# Patient Record
Sex: MALE | Race: BLACK OR AFRICAN AMERICAN | NOT HISPANIC OR LATINO | Employment: UNEMPLOYED | URBAN - METROPOLITAN AREA
[De-identification: names, ages, dates, MRNs, and addresses within clinical notes are randomized per-mention and may not be internally consistent; named-entity substitution may affect disease eponyms.]

---

## 2017-01-11 ENCOUNTER — INITIAL CONSULT (OUTPATIENT)
Dept: TRANSPLANT | Facility: CLINIC | Age: 52
End: 2017-01-11
Payer: MEDICAID

## 2017-01-11 ENCOUNTER — LAB VISIT (OUTPATIENT)
Dept: LAB | Facility: HOSPITAL | Age: 52
End: 2017-01-11
Attending: INTERNAL MEDICINE
Payer: MEDICAID

## 2017-01-11 VITALS
HEIGHT: 65 IN | DIASTOLIC BLOOD PRESSURE: 73 MMHG | HEART RATE: 94 BPM | SYSTOLIC BLOOD PRESSURE: 119 MMHG | WEIGHT: 177.69 LBS | BODY MASS INDEX: 29.61 KG/M2

## 2017-01-11 DIAGNOSIS — N18.4 CHRONIC KIDNEY DISEASE, STAGE 4, SEVERELY DECREASED GFR: ICD-10-CM

## 2017-01-11 DIAGNOSIS — I50.22 CHRONIC SYSTOLIC HEART FAILURE: ICD-10-CM

## 2017-01-11 DIAGNOSIS — I10 ESSENTIAL HYPERTENSION: Primary | ICD-10-CM

## 2017-01-11 DIAGNOSIS — E08.21 DIABETES MELLITUS DUE TO UNDERLYING CONDITION WITH DIABETIC NEPHROPATHY, WITHOUT LONG-TERM CURRENT USE OF INSULIN: ICD-10-CM

## 2017-01-11 DIAGNOSIS — I25.10 CORONARY ARTERY DISEASE INVOLVING NATIVE CORONARY ARTERY OF NATIVE HEART WITHOUT ANGINA PECTORIS: ICD-10-CM

## 2017-01-11 DIAGNOSIS — I50.9 CONGESTIVE HEART FAILURE, UNSPECIFIED CONGESTIVE HEART FAILURE CHRONICITY, UNSPECIFIED CONGESTIVE HEART FAILURE TYPE: ICD-10-CM

## 2017-01-11 LAB
ALBUMIN SERPL BCP-MCNC: 3 G/DL
ALP SERPL-CCNC: 147 U/L
ALT SERPL W/O P-5'-P-CCNC: 71 U/L
ANION GAP SERPL CALC-SCNC: 4 MMOL/L
AST SERPL-CCNC: 42 U/L
BASOPHILS # BLD AUTO: 0.01 K/UL
BASOPHILS NFR BLD: 0.2 %
BILIRUB SERPL-MCNC: 0.8 MG/DL
BNP SERPL-MCNC: 2840 PG/ML
BUN SERPL-MCNC: 36 MG/DL
CALCIUM SERPL-MCNC: 8.9 MG/DL
CHLORIDE SERPL-SCNC: 105 MMOL/L
CO2 SERPL-SCNC: 32 MMOL/L
CREAT SERPL-MCNC: 1.8 MG/DL
DIFFERENTIAL METHOD: ABNORMAL
EOSINOPHIL # BLD AUTO: 0.2 K/UL
EOSINOPHIL NFR BLD: 4 %
ERYTHROCYTE [DISTWIDTH] IN BLOOD BY AUTOMATED COUNT: 13.6 %
EST. GFR  (AFRICAN AMERICAN): 48.9 ML/MIN/1.73 M^2
EST. GFR  (NON AFRICAN AMERICAN): 42.3 ML/MIN/1.73 M^2
GLUCOSE SERPL-MCNC: 139 MG/DL
HCT VFR BLD AUTO: 36.3 %
HGB BLD-MCNC: 12.1 G/DL
LYMPHOCYTES # BLD AUTO: 1.7 K/UL
LYMPHOCYTES NFR BLD: 33.9 %
MCH RBC QN AUTO: 28.8 PG
MCHC RBC AUTO-ENTMCNC: 33.3 %
MCV RBC AUTO: 86 FL
MONOCYTES # BLD AUTO: 0.5 K/UL
MONOCYTES NFR BLD: 9.9 %
NEUTROPHILS # BLD AUTO: 2.6 K/UL
NEUTROPHILS NFR BLD: 51.6 %
PLATELET # BLD AUTO: 275 K/UL
PMV BLD AUTO: 9.2 FL
POTASSIUM SERPL-SCNC: 4.1 MMOL/L
PROT SERPL-MCNC: 6.8 G/DL
RBC # BLD AUTO: 4.2 M/UL
SODIUM SERPL-SCNC: 141 MMOL/L
WBC # BLD AUTO: 5.04 K/UL

## 2017-01-11 PROCEDURE — 99213 OFFICE O/P EST LOW 20 MIN: CPT | Mod: PBBFAC | Performed by: INTERNAL MEDICINE

## 2017-01-11 PROCEDURE — 99204 OFFICE O/P NEW MOD 45 MIN: CPT | Mod: S$PBB,,, | Performed by: INTERNAL MEDICINE

## 2017-01-11 PROCEDURE — 99999 PR PBB SHADOW E&M-EST. PATIENT-LVL III: CPT | Mod: PBBFAC,,, | Performed by: INTERNAL MEDICINE

## 2017-01-11 RX ORDER — ATORVASTATIN CALCIUM 40 MG/1
40 TABLET, FILM COATED ORAL NIGHTLY
COMMUNITY
Start: 2017-01-06 | End: 2018-01-16 | Stop reason: SDUPTHER

## 2017-01-11 RX ORDER — ISOSORBIDE MONONITRATE 30 MG/1
30 TABLET, EXTENDED RELEASE ORAL DAILY
COMMUNITY
Start: 2017-01-06 | End: 2018-01-16 | Stop reason: SDUPTHER

## 2017-01-11 RX ORDER — FUROSEMIDE 40 MG/1
40 TABLET ORAL DAILY
COMMUNITY
Start: 2017-01-06 | End: 2020-04-08

## 2017-01-11 RX ORDER — HYDRALAZINE HYDROCHLORIDE 10 MG/1
10 TABLET, FILM COATED ORAL 3 TIMES DAILY
COMMUNITY
Start: 2017-01-06 | End: 2018-01-16 | Stop reason: SDUPTHER

## 2017-01-11 RX ORDER — GLIMEPIRIDE 1 MG/1
2 TABLET ORAL DAILY
COMMUNITY
Start: 2017-01-06 | End: 2019-12-02

## 2017-01-11 RX ORDER — SPIRONOLACTONE 25 MG/1
25 TABLET ORAL DAILY
COMMUNITY
Start: 2017-01-06 | End: 2018-01-16 | Stop reason: SDUPTHER

## 2017-01-11 RX ORDER — ASPIRIN 81 MG/1
81 TABLET ORAL DAILY
COMMUNITY
Start: 2017-01-06 | End: 2020-08-17 | Stop reason: SDUPTHER

## 2017-01-11 RX ORDER — METOPROLOL TARTRATE 25 MG/1
25 TABLET, FILM COATED ORAL 2 TIMES DAILY
COMMUNITY
Start: 2017-01-06 | End: 2018-01-16 | Stop reason: SDUPTHER

## 2017-01-11 NOTE — LETTER
January 11, 2017        Juan C West  [address] [address]  Phone: [phone]  Fax: [phone]             [facility]  [address]  [facility] [address]  Phone: [phone]   Patient: Mick Monroe   MR Number: [id number]   YOB: 1965   Date of Visit: 1/11/2017       Dear Dr. Juan C West    Thank you for referring Mick Monroe to me for evaluation. Attached you will find relevant portions of my assessment and plan of care.    If you have questions, please do not hesitate to call me. I look forward to following Mick Monroe along with you.    Sincerely,    Jai Lau MD    Enclosure    If you would like to receive this communication electronically, please contact [email or web address] or [phone] to request [employer] Link access.    [employer] Link is a tool which provides read-only access to select patient information with whom you have a relationship. Its easy to use and provides real time access to review your patients record including encounter summaries, notes, results, and demographic information.    If you feel you have received this communication in error or would no longer like to receive these types of communications, please e-mail [email or web address]

## 2017-01-11 NOTE — MR AVS SNAPSHOT
[facility]  [address]  [facility] [address]  Phone: [phone]                  Mick Monroe   2017 1:30 PM   Initial consult    Description:  Male : 1965   Provider:  Jai Lau MD   Department:  [facility]           Reason for Visit     Heart Transplant Pre-evaluation           Diagnoses this Visit        Comments    Essential hypertension    -  Primary     Coronary artery disease involving native coronary artery of native heart without angina pectoris         Chronic systolic heart failure         Chronic kidney disease, stage 4, severely decreased GFR         Diabetes mellitus due to underlying condition with diabetic nephropathy, without long-term current use of insulin                To Do List           Goals (5 Years of Data)     None      Follow-Up and Disposition     Return in about 4 months (around 2017).    Follow-up and Disposition History      [facility]     [facility] Nurse Care [facility] -  Assistance  Registered nurses in the [facility] provide clinical advisement, health education, appointment booking, and other advisory services.  Call for this free service at [phone].             Medications           Message regarding Medications     Verify the changes and/or additions to your medication regime listed below are the same as discussed with your clinician today.  If any of these changes or additions are incorrect, please notify your healthcare provider.             Verify that the below list of medications is an accurate representation of the medications you are currently taking.  If none reported, the list may be blank. If incorrect, please contact your healthcare provider. Carry this list with you in case of emergency.           Current Medications     aspirin (ECOTRIN) 81 MG EC tablet Take 81 mg by mouth Daily.    atorvastatin (LIPITOR) 40 MG tablet Take 40 mg by mouth every evening.     "furosemide (LASIX) 40 MG tablet Take 40 mg by mouth Daily.    glimepiride (AMARYL) 1 MG tablet Take 1 mg by mouth Daily.    hydrALAZINE (APRESOLINE) 10 MG tablet Take 10 mg by mouth 3 (three) times daily.    isosorbide mononitrate (IMDUR) 30 MG 24 hr tablet Take 30 mg by mouth Daily.    metoprolol tartrate (LOPRESSOR) 25 MG tablet Take 25 mg by mouth Daily.    spironolactone (ALDACTONE) 25 MG tablet Take 25 mg by mouth Daily.    ticagrelor (BRILINTA) 90 mg tablet Take 90 mg by mouth 2 (two) times daily.           Clinical Reference Information           Vital Signs - Last Recorded  Most recent update: 1/11/2017  1:12 PM by Radha Webster MA    BP Pulse Ht Wt BMI    119/73 94 5' 5" (1.651 m) 80.6 kg (177 lb 11.1 oz) 29.57 kg/m2      Blood Pressure          Most Recent Value    BP  119/73      Allergies as of 1/11/2017     No Known Allergies      Immunizations Administered on Date of Encounter - 1/11/2017     None      Maintenance Dialysis History     Patient has no recorded history of maintenance dialysis.      Transplant Information        Txp Date Organ Coordinator Care Team     Heart Olivia Kent RN Referring Physician:  Juan C West MD   Corresponding Physician:  Juan C West MD         [facility] Sign-Up     Activating your [facility] account is as easy as 1-2-3!     1) Visit [email or web address], select Sign Up Now, enter this activation code and your date of birth, then select Next.  [email or web address]  Expires: 2/25/2017  1:44 PM      2) Create a username and password to use when you visit [facility] in the future and select a security question in case you lose your password and select Next.    3) Enter your e-mail address and click Sign Up!    Additional Information  If you have questions, please e-mail [email or web address] or call [phone] to talk to our [facility] staff. Remember, [facility] is NOT to be used for urgent needs. For medical emergencies, dial 911.         Instructions    CD       "

## 2017-01-11 NOTE — PROGRESS NOTES
Subjective:   Initial evaluation of heart transplant candidacy.    HPI:  Mr. Monroe is a 51 y.o. year old  male who has presents to be considered for advanced surgical options (LVAD/OHT).     He comes from [place]. History of CAD severe three vessel disease (not a surgical candidate??)  EF 10 to 15 % by records and history of symptomatic heart failure. He also has a history of essential hypertension and diabetes with crenal disease (creatinine 2.0). He underwent coronary stents placement. He is on the life vest they will check EF and go from there     KONG FC II-III NYHA    No past medical history on file.  No past surgical history on file.    No family history on file.    Review of Systems   Constitution: Negative for chills, decreased appetite, diaphoresis, fever, weakness, malaise/fatigue, night sweats, weight gain and weight loss.   Cardiovascular: Positive for dyspnea on exertion. Negative for chest pain, claudication, cyanosis, irregular heartbeat, leg swelling, near-syncope, orthopnea and palpitations.   Respiratory: Negative for cough, hemoptysis, shortness of breath, sleep disturbances due to breathing, snoring, sputum production and wheezing.    Gastrointestinal: Negative for abdominal pain and diarrhea.       Objective:   There were no vitals taken for this visit.body mass index is 29.57 kg/(m^2).    Physical Exam   Constitutional: He is oriented to person, place, and time. He appears well-developed and well-nourished.   HENT:   Head: Normocephalic and atraumatic.   Eyes: Conjunctivae and EOM are normal. Pupils are equal, round, and reactive to light.   Neck: Normal range of motion. Neck supple. No JVD present.   Cardiovascular: Normal rate and regular rhythm.  Exam reveals no gallop and no friction rub.    No murmur heard.  Pulmonary/Chest: Breath sounds normal. No respiratory distress. He has no wheezes. He has no rales. He exhibits no tenderness.   Abdominal: Soft. Bowel sounds are  normal. He exhibits no distension and no mass. There is no hepatosplenomegaly, splenomegaly or hepatomegaly. There is no tenderness. There is no rebound, no guarding and no CVA tenderness.   No hepatoslenomegaly   Musculoskeletal: Normal range of motion. He exhibits no edema or tenderness.   Neurological: He is alert and oriented to person, place, and time. He has normal reflexes. No cranial nerve deficit. He exhibits normal muscle tone. Coordination normal.   Skin: Skin is warm and dry.   Psychiatric: He has a normal mood and affect.       Labs:      Chemistry    No results found for: NA, K, CL, CO2, BUN, CREATININE, GLU No results found for: CALCIUM, ALKPHOS, AST, ALT, BILITOT       No results found for: MG  No results found for: WBC, HGB, HCT, MCV, PLT  No results found for: BNP  No results found for this or any previous visit.    Labs were reviewed with the patient.    Assessment:      1. Essential hypertension    2. Coronary artery disease involving native coronary artery of native heart without angina pectoris    3. Chronic systolic heart failure    4. Chronic kidney disease, stage 4, severely decreased GFR    5. Diabetes mellitus due to underlying condition with diabetic nephropathy, without long-term current use of insulin        Plan:     Patient is now NYHA III ACC stage C  Recommend 2 gram sodium restriction and 1500cc fluid restriction.  Encourage physical activity with graded exercise program.  Requested patient to weigh themselves daily, and to notify us if their weight increases by more than 3 lbs in 1 day or 5 lbs in 1 week.    Continue medical therapy. Life vets and ICD    RTC 4 months. NO need for work up for tx lvad now       Transplant Candidacy: Patient is a 51 y.o. year old male with heart failure is being seen for possible LVAD and OHT. In my opinion, he is  a suitable LVAD and OHT candidate. Patient did not meet with MCS and/or pre-transplant coordinator at the end of this visit for workup.      UNOS Patient Status  Functional Status: 90% - Able to carry on normal activity: minor symptoms of disease  Physical Capacity: Limited Mobility  Working for Income: No  If no, reason not working: Disability    Jai Lau MD

## 2017-04-05 ENCOUNTER — TELEPHONE (OUTPATIENT)
Dept: TRANSPLANT | Facility: CLINIC | Age: 52
End: 2017-04-05

## 2017-04-05 DIAGNOSIS — I50.9 CONGESTIVE HEART FAILURE, UNSPECIFIED CONGESTIVE HEART FAILURE CHRONICITY, UNSPECIFIED CONGESTIVE HEART FAILURE TYPE: Primary | ICD-10-CM

## 2017-05-09 ENCOUNTER — LAB VISIT (OUTPATIENT)
Dept: LAB | Facility: HOSPITAL | Age: 52
End: 2017-05-09
Attending: INTERNAL MEDICINE
Payer: MEDICAID

## 2017-05-09 ENCOUNTER — OFFICE VISIT (OUTPATIENT)
Dept: TRANSPLANT | Facility: CLINIC | Age: 52
End: 2017-05-09
Payer: MEDICAID

## 2017-05-09 VITALS
DIASTOLIC BLOOD PRESSURE: 79 MMHG | HEIGHT: 65 IN | BODY MASS INDEX: 31.63 KG/M2 | HEART RATE: 73 BPM | SYSTOLIC BLOOD PRESSURE: 132 MMHG | WEIGHT: 189.81 LBS

## 2017-05-09 DIAGNOSIS — N18.4 CHRONIC KIDNEY DISEASE, STAGE 4, SEVERELY DECREASED GFR: ICD-10-CM

## 2017-05-09 DIAGNOSIS — I50.9 CONGESTIVE HEART FAILURE, UNSPECIFIED CONGESTIVE HEART FAILURE CHRONICITY, UNSPECIFIED CONGESTIVE HEART FAILURE TYPE: ICD-10-CM

## 2017-05-09 DIAGNOSIS — I10 ESSENTIAL HYPERTENSION: ICD-10-CM

## 2017-05-09 DIAGNOSIS — I50.22 CHRONIC SYSTOLIC HEART FAILURE: Primary | ICD-10-CM

## 2017-05-09 DIAGNOSIS — I25.10 CORONARY ARTERY DISEASE INVOLVING NATIVE CORONARY ARTERY OF NATIVE HEART WITHOUT ANGINA PECTORIS: ICD-10-CM

## 2017-05-09 DIAGNOSIS — E08.21 DIABETES MELLITUS DUE TO UNDERLYING CONDITION WITH DIABETIC NEPHROPATHY, WITHOUT LONG-TERM CURRENT USE OF INSULIN: ICD-10-CM

## 2017-05-09 LAB
ANION GAP SERPL CALC-SCNC: 10 MMOL/L
BNP SERPL-MCNC: 451 PG/ML
BUN SERPL-MCNC: 31 MG/DL
CALCIUM SERPL-MCNC: 9.6 MG/DL
CHLORIDE SERPL-SCNC: 103 MMOL/L
CO2 SERPL-SCNC: 27 MMOL/L
CREAT SERPL-MCNC: 2 MG/DL
EST. GFR  (AFRICAN AMERICAN): 42.8 ML/MIN/1.73 M^2
EST. GFR  (NON AFRICAN AMERICAN): 37 ML/MIN/1.73 M^2
GLUCOSE SERPL-MCNC: 242 MG/DL
POTASSIUM SERPL-SCNC: 3.7 MMOL/L
SODIUM SERPL-SCNC: 140 MMOL/L

## 2017-05-09 PROCEDURE — 99999 PR PBB SHADOW E&M-EST. PATIENT-LVL III: CPT | Mod: PBBFAC,TXP,, | Performed by: INTERNAL MEDICINE

## 2017-05-09 PROCEDURE — 99215 OFFICE O/P EST HI 40 MIN: CPT | Mod: S$PBB,NTX,, | Performed by: INTERNAL MEDICINE

## 2017-05-09 PROCEDURE — 99213 OFFICE O/P EST LOW 20 MIN: CPT | Mod: PBBFAC,TXP | Performed by: INTERNAL MEDICINE

## 2017-05-09 NOTE — MR AVS SNAPSHOT
[facility]  [address]  [facility] [address]  Phone: [phone]                  Mick Monroe   2017 10:30 AM   Office Visit    Description:  Male : 1965   Provider:  Jai Lau MD   Department:  [facility]           Diagnoses this Visit        Comments    Chronic systolic heart failure    -  Primary     Coronary artery disease involving native coronary artery of native heart without angina pectoris         Essential hypertension         Chronic kidney disease, stage 4, severely decreased GFR         Diabetes mellitus due to underlying condition with diabetic nephropathy, without long-term current use of insulin                To Do List           Future Appointments        Provider Department Dept Phone    2017 9:00 AM LAB, APPOINTMENT [place] [facility] [phone]    2017 10:30 AM Jai Lau MD [facility] [phone]      Goals (5 Years of Data)     None      Follow-Up and Disposition     Return in about 2 months (around 2017).    Follow-up and Disposition History      [facility]     [facility] Care [facility] -  Assistance  Unless otherwise directed by your provider, please contact [facility], our nurse care line that is available for  assistance.     Registered nurses in the [facility] provide: appointment scheduling, clinical advisement, health education, and other advisory services.  Call: [phone] (toll free)               Medications           Message regarding Medications     Verify the changes and/or additions to your medication regime listed below are the same as discussed with your clinician today.  If any of these changes or additions are incorrect, please notify your healthcare provider.        STOP taking these medications     ticagrelor (BRILINTA) 90 mg tablet Take 90 mg by mouth 2 (two) times daily.           Verify that the below list  "of medications is an accurate representation of the medications you are currently taking.  If none reported, the list may be blank. If incorrect, please contact your healthcare provider. Carry this list with you in case of emergency.           Current Medications     aspirin (ECOTRIN) 81 MG EC tablet Take 81 mg by mouth Daily.    atorvastatin (LIPITOR) 40 MG tablet Take 40 mg by mouth every evening.    furosemide (LASIX) 40 MG tablet Take 40 mg by mouth Daily.    glimepiride (AMARYL) 1 MG tablet Take 1.5 mg by mouth Daily.     hydrALAZINE (APRESOLINE) 10 MG tablet Take 10 mg by mouth 3 (three) times daily.    isosorbide mononitrate (IMDUR) 30 MG 24 hr tablet Take 30 mg by mouth Daily.    metoprolol tartrate (LOPRESSOR) 25 MG tablet Take 25 mg by mouth 2 (two) times daily.     sacubitril-valsartan (ENTRESTO) 24-26 mg per tablet Take 1 tablet by mouth 2 (two) times daily.    spironolactone (ALDACTONE) 25 MG tablet Take 25 mg by mouth Daily.           Clinical Reference Information           Your Vitals Were     BP Pulse Height Weight BMI    132/79 73 5' 5" (1.651 m) 86.1 kg (189 lb 13.1 oz) 31.59 kg/m2      Blood Pressure          Most Recent Value    BP  132/79      Allergies as of 5/9/2017     No Known Allergies      Immunizations Administered on Date of Encounter - 5/9/2017     None      Maintenance Dialysis History     Patient has no recorded history of maintenance dialysis.      Transplant Information        Txp Date Organ Coordinator Care Team     Heart Olivia Kent RN Referring Physician:  Juan C West MD   Corresponding Physician:  Juan C West MD         [facility] Sign-Up     Activating your [facility] account is as easy as 1-2-3!     1) Visit [email or web address], select Sign Up Now, enter this activation code and your date of birth, then select Next.  [email or web address]  Expires: 6/23/2017  8:46 AM      2) Create a username and password to use when you visit [facility] in the future and select a " security question in case you lose your password and select Next.    3) Enter your e-mail address and click Sign Up!    Additional Information  If you have questions, please e-mail [email or web address] or call [phone] to talk to our [facility] staff. Remember, [facility] is NOT to be used for urgent needs. For medical emergencies, dial 911.         Language Assistance Services     ATTENTION: Language assistance services are available, free of charge. Please call [phone].      ATENCIÓN: Si habla español, tiene a dixon disposición servicios gratuitos de asistencia lingüística. Llame al [phone].     CHÚ Ý: N?u b?n nói Ti?ng Vi?t, có các d?ch v? h? tr? ngôn ng? mi?n phí dành cho b?n. G?i s? [phone].         [facility] complies with applicable Federal civil rights laws and does not discriminate on the basis of race, color, national origin, age, disability, or sex.

## 2017-05-09 NOTE — PROGRESS NOTES
"Subjective:   Initial evaluation of heart transplant candidacy.    HPI:  Mr. Monroe is a 52 y.o. year old  male who has presents to be considered for advanced surgical options (LVAD/OHT).     He comes from [place]. History of CAD severe three vessel disease (not a surgical candidate??)  EF 10 to 15 % by records and history of symptomatic heart failure. He also has a history of essential hypertension and diabetes with crenal disease (creatinine 2.0). He underwent coronary stents placement. Doing well at this time.  shortness of breath FC II NYHA    KONG FC II-III NYHA    Past Medical History:   Diagnosis Date    CHF (congestive heart failure)     Chronic renal disease, stage IV     Coronary atherosclerosis of unspecified type of vessel, native or graft     Diabetes mellitus     Hypertension      Past Surgical History:   Procedure Laterality Date    APPENDECTOMY         Family History   Problem Relation Age of Onset    Cancer Mother     Diabetes Father        Review of Systems   Constitution: Negative for chills, decreased appetite, diaphoresis, fever, weakness, malaise/fatigue, night sweats, weight gain and weight loss.   Cardiovascular: Positive for dyspnea on exertion. Negative for chest pain, claudication, cyanosis, irregular heartbeat, leg swelling, near-syncope, orthopnea and palpitations.   Respiratory: Negative for cough, hemoptysis, shortness of breath, sleep disturbances due to breathing, snoring, sputum production and wheezing.    Gastrointestinal: Negative for abdominal pain and diarrhea.       Objective:   Blood pressure 132/79, pulse 73, height 5' 5" (1.651 m), weight 86.1 kg (189 lb 13.1 oz).body mass index is 31.59 kg/(m^2).    Physical Exam   Constitutional: He is oriented to person, place, and time. He appears well-developed and well-nourished.   HENT:   Head: Normocephalic and atraumatic.   Eyes: Conjunctivae and EOM are normal. Pupils are equal, round, and reactive to light. "   Neck: Normal range of motion. Neck supple. No JVD present.   Cardiovascular: Normal rate and regular rhythm.  Exam reveals no gallop and no friction rub.    No murmur heard.  Pulmonary/Chest: Breath sounds normal. No respiratory distress. He has no wheezes. He has no rales. He exhibits no tenderness.   Abdominal: Soft. Bowel sounds are normal. He exhibits no distension and no mass. There is no hepatosplenomegaly, splenomegaly or hepatomegaly. There is no tenderness. There is no rebound, no guarding and no CVA tenderness.   No hepatoslenomegaly   Musculoskeletal: Normal range of motion. He exhibits no edema or tenderness.   Neurological: He is alert and oriented to person, place, and time. He has normal reflexes. No cranial nerve deficit. He exhibits normal muscle tone. Coordination normal.   Skin: Skin is warm and dry.   Psychiatric: He has a normal mood and affect.       Labs:      Chemistry        Component Value Date/Time     01/11/2017 1147    K 4.1 01/11/2017 1147     01/11/2017 1147    CO2 32 (H) 01/11/2017 1147    BUN 36 (H) 01/11/2017 1147    CREATININE 1.8 (H) 01/11/2017 1147     (H) 01/11/2017 1147        Component Value Date/Time    CALCIUM 8.9 01/11/2017 1147    ALKPHOS 147 (H) 01/11/2017 1147    AST 42 (H) 01/11/2017 1147    ALT 71 (H) 01/11/2017 1147    BILITOT 0.8 01/11/2017 1147          No results found for: MG  Lab Results   Component Value Date    WBC 5.04 01/11/2017    HGB 12.1 (L) 01/11/2017    HCT 36.3 (L) 01/11/2017    MCV 86 01/11/2017     01/11/2017     BNP   Date Value Ref Range Status   01/11/2017 2840 (H) 0 - 99 pg/mL Final     Comment:     Values of less than 100 pg/ml are consistent with non-CHF populations.     No results found for this or any previous visit.    Labs were reviewed with the patient.    Assessment:      1. Chronic systolic heart failure    2. Coronary artery disease involving native coronary artery of native heart without angina pectoris    3.  Essential hypertension    4. Chronic kidney disease, stage 4, severely decreased GFR    5. Diabetes mellitus due to underlying condition with diabetic nephropathy, without long-term current use of insulin        Plan:     Patient is now NYHA III ACC stage C  Recommend 2 gram sodium restriction and 1500cc fluid restriction.  Encourage physical activity with graded exercise program.  Requested patient to weigh themselves daily, and to notify us if their weight increases by more than 3 lbs in 1 day or 5 lbs in 1 week.      Continue medical therapy. Life vest and ICD    Needs 2 D echo and reassess EF possible ICD         Transplant Candidacy: Patient is a 52 y.o. year old male with heart failure is being seen for possible LVAD and OHT. In my opinion, he is  a suitable LVAD and OHT candidate. Patient did not meet with MCS and/or pre-transplant coordinator at the end of this visit for workup.     UNOS Patient Status  Functional Status: 90% - Able to carry on normal activity: minor symptoms of disease  Physical Capacity: Limited Mobility  Working for Income: No  If no, reason not working: Disability    Jai Lau MD

## 2017-05-09 NOTE — LETTER
May 9, 2017        Juan C West  [address] [address]  Phone: [phone]  Fax: [phone]             [facility]  [address]  [facility] [address]  Phone: [phone]   Patient: Mick Monroe   MR Number: [id number]   YOB: 1965   Date of Visit: 5/9/2017       Dear Dr. Juan C West    Thank you for referring Mick Monroe to me for evaluation. Attached you will find relevant portions of my assessment and plan of care.    If you have questions, please do not hesitate to call me. I look forward to following Mick Monroe along with you.    Sincerely,    Jai Lau MD    Enclosure    If you would like to receive this communication electronically, please contact [email or web address] or [phone] to request [employer] Link access.    [employer] Link is a tool which provides read-only access to select patient information with whom you have a relationship. Its easy to use and provides real time access to review your patients record including encounter summaries, notes, results, and demographic information.    If you feel you have received this communication in error or would no longer like to receive these types of communications, please e-mail [email or web address]

## 2017-05-11 ENCOUNTER — TELEPHONE (OUTPATIENT)
Dept: TRANSPLANT | Facility: CLINIC | Age: 52
End: 2017-05-11

## 2017-05-11 NOTE — TELEPHONE ENCOUNTER
"Contacted patient regarding fu for ICD/life vest and 2D echo, pt stated Dr Lau sent "a note" to his local cardiologist and he has a fu appt with him (Dr West) on 5/17/17. Will place a reminder on the phone review to fu w/pt that evening.   "

## 2017-05-16 ENCOUNTER — TELEPHONE (OUTPATIENT)
Dept: TRANSPLANT | Facility: CLINIC | Age: 52
End: 2017-05-16

## 2017-05-16 NOTE — TELEPHONE ENCOUNTER
Attempted to contact patient regarding fu for ICD/life vest and 2D echo, pt seeing his local cardiologist (Dr West) tomorrow. Spoke to father, stated he will leave message for his son to call back.

## 2017-05-18 ENCOUNTER — TELEPHONE (OUTPATIENT)
Dept: TRANSPLANT | Facility: CLINIC | Age: 52
End: 2017-05-18

## 2017-05-18 NOTE — TELEPHONE ENCOUNTER
Attempted to contact patient for fu with local cardiologist regarding decision for life vest/ICD. Voice message left with contact number.     Patient called back, stated Dr West recommended he have an ICD and would like to come to [facility] for surgery.    Contacted Dr Jenna Mae's nurse at [facility][phone], stated will place consult to EP at [facility]. Will continue to follow.

## 2017-05-22 ENCOUNTER — TELEPHONE (OUTPATIENT)
Dept: TRANSPLANT | Facility: CLINIC | Age: 52
End: 2017-05-22

## 2017-05-22 NOTE — TELEPHONE ENCOUNTER
Dr Jenna Mullen's office [facility][phone], contacted fu for EP consult at [facility] for ICD. Stated Carmelita is off today will give message and have her call back tomorrow. Placed on phone review to fu.

## 2017-06-06 ENCOUNTER — TELEPHONE (OUTPATIENT)
Dept: TRANSPLANT | Facility: CLINIC | Age: 52
End: 2017-06-06

## 2017-06-06 NOTE — TELEPHONE ENCOUNTER
Contacted Carmelita at  Dr West's office [facility][phone], inquired if consult was placed for ICD with EP at [facility], consult not initiated to date. Carmelita transferred to EP per request.     Patient notified of the above, pt will call Carmelita to FU with appts, contact number given to patient. Encouraged to call if has any questions, voiced understanding.

## 2017-06-09 ENCOUNTER — NURSE TRIAGE (OUTPATIENT)
Dept: ADMINISTRATIVE | Facility: CLINIC | Age: 52
End: 2017-06-09

## 2017-06-09 ENCOUNTER — TELEPHONE (OUTPATIENT)
Dept: TRANSPLANT | Facility: CLINIC | Age: 52
End: 2017-06-09

## 2017-06-09 NOTE — TELEPHONE ENCOUNTER
----- Message from Kathleen Gibbons sent at 6/9/2017  4:09 PM CDT -----  Contact: Niesha/[facility]  Spoke to Niesha at [phone].  Patient has general questions regarding the Life vest, please call pt    Thank you

## 2017-06-09 NOTE — TELEPHONE ENCOUNTER
Patient inquiring if he can go to a  with life vest, instructed he will be fine and to keep life vest on, voiced understanding.

## 2017-06-09 NOTE — TELEPHONE ENCOUNTER
Reason for Disposition   Health Information question, no triage required and triager able to answer question    Protocols used: ST INFORMATION ONLY CALL-A-AH    Patient was making sure it's okay if he goes to a  today since he is wearing a lifevest. He states the reason he was asking is because is father had a weak heart and was told not to go to a . Advised as long as he is wearing the vest and it's not a situation where he feels like he will be getting anxious/stressed, and he's not exerting himself I don't see why it would be an issue. He states it is not a  of someone he is close with, he is going with a friend. Did not see any activity restrictions in his chart but advised I would send a message to the staff just in case requesting a call back. Please contact caller with any further care advice.

## 2017-06-29 DIAGNOSIS — I50.22 CHRONIC SYSTOLIC HEART FAILURE: Primary | ICD-10-CM

## 2017-06-30 ENCOUNTER — OFFICE VISIT (OUTPATIENT)
Dept: TRANSPLANT | Facility: CLINIC | Age: 52
End: 2017-06-30
Payer: MEDICAID

## 2017-06-30 ENCOUNTER — HOSPITAL ENCOUNTER (OUTPATIENT)
Dept: CARDIOLOGY | Facility: CLINIC | Age: 52
Discharge: HOME | End: 2017-06-30
Payer: MEDICAID

## 2017-06-30 ENCOUNTER — INITIAL CONSULT (OUTPATIENT)
Dept: ELECTROPHYSIOLOGY | Facility: CLINIC | Age: 52
End: 2017-06-30
Payer: MEDICAID

## 2017-06-30 VITALS
SYSTOLIC BLOOD PRESSURE: 132 MMHG | DIASTOLIC BLOOD PRESSURE: 77 MMHG | WEIGHT: 189.63 LBS | SYSTOLIC BLOOD PRESSURE: 118 MMHG | BODY MASS INDEX: 31.55 KG/M2 | HEART RATE: 66 BPM | HEART RATE: 68 BPM | HEIGHT: 65 IN | BODY MASS INDEX: 31.59 KG/M2 | HEIGHT: 65 IN | DIASTOLIC BLOOD PRESSURE: 84 MMHG | WEIGHT: 189.38 LBS

## 2017-06-30 DIAGNOSIS — E08.21 DIABETES MELLITUS DUE TO UNDERLYING CONDITION WITH DIABETIC NEPHROPATHY, WITHOUT LONG-TERM CURRENT USE OF INSULIN: ICD-10-CM

## 2017-06-30 DIAGNOSIS — I25.10 CORONARY ARTERY DISEASE INVOLVING NATIVE CORONARY ARTERY OF NATIVE HEART WITHOUT ANGINA PECTORIS: ICD-10-CM

## 2017-06-30 DIAGNOSIS — I25.5 ISCHEMIC DILATED CARDIOMYOPATHY: ICD-10-CM

## 2017-06-30 DIAGNOSIS — I50.22 CHRONIC SYSTOLIC HEART FAILURE: Primary | ICD-10-CM

## 2017-06-30 DIAGNOSIS — I42.0 ISCHEMIC DILATED CARDIOMYOPATHY: Primary | ICD-10-CM

## 2017-06-30 DIAGNOSIS — I25.5 ISCHEMIC DILATED CARDIOMYOPATHY: Primary | ICD-10-CM

## 2017-06-30 DIAGNOSIS — I42.0 ISCHEMIC DILATED CARDIOMYOPATHY: ICD-10-CM

## 2017-06-30 DIAGNOSIS — I10 ESSENTIAL HYPERTENSION: ICD-10-CM

## 2017-06-30 DIAGNOSIS — I25.10 CORONARY ARTERY DISEASE INVOLVING NATIVE CORONARY ARTERY OF NATIVE HEART WITHOUT ANGINA PECTORIS: Primary | ICD-10-CM

## 2017-06-30 DIAGNOSIS — N18.4 CHRONIC KIDNEY DISEASE, STAGE 4, SEVERELY DECREASED GFR: ICD-10-CM

## 2017-06-30 DIAGNOSIS — I50.22 CHRONIC SYSTOLIC HEART FAILURE: ICD-10-CM

## 2017-06-30 LAB — RETIRED EF AND QEF - SEE NOTES: 25 (ref 55–65)

## 2017-06-30 PROCEDURE — 99999 PR PBB SHADOW E&M-EST. PATIENT-LVL III: CPT | Mod: PBBFAC,TXP,, | Performed by: INTERNAL MEDICINE

## 2017-06-30 PROCEDURE — 99204 OFFICE O/P NEW MOD 45 MIN: CPT | Mod: S$PBB,NTX,, | Performed by: INTERNAL MEDICINE

## 2017-06-30 PROCEDURE — 93306 TTE W/DOPPLER COMPLETE: CPT | Mod: PBBFAC,NTX | Performed by: INTERNAL MEDICINE

## 2017-06-30 PROCEDURE — 99215 OFFICE O/P EST HI 40 MIN: CPT | Mod: S$PBB,NTX,, | Performed by: INTERNAL MEDICINE

## 2017-06-30 PROCEDURE — 93005 ELECTROCARDIOGRAM TRACING: CPT | Mod: PBBFAC,NTX | Performed by: INTERNAL MEDICINE

## 2017-06-30 PROCEDURE — 93010 ELECTROCARDIOGRAM REPORT: CPT | Mod: S$PBB,NTX,, | Performed by: INTERNAL MEDICINE

## 2017-06-30 NOTE — LETTER
June 30, 2017      Juan C West MD  [address] [place] [address]           [facility] - Arrhythmia  [address]  [place] [address]  Phone: [phone]  Fax: [phone]          Patient: Mick Monroe   MR Number: [id number]   YOB: 1965   Date of Visit: 6/30/2017       Dear Dr. Juan C West:    Thank you for referring Mick Monroe to me for evaluation. Attached you will find relevant portions of my assessment and plan of care.    If you have questions, please do not hesitate to call me. I look forward to following Mick Monroe along with you.    Sincerely,    David Cline MD    Enclosure  CC:  No Recipients    If you would like to receive this communication electronically, please contact [email or web address] or [phone] to request more information on [employer] Link access.    For providers and/or their staff who would like to refer a patient to [facility], please contact us through our one-stop-shop provider referral line, [facility], at [phone].    If you feel you have received this communication in error or would no longer like to receive these types of communications, please e-mail [email or web address]

## 2017-06-30 NOTE — PROGRESS NOTES
EXTRACTION/ IMPLANTABLE DEVICE EDUCATION CHECKLIST    8/24/17 - Labs  PRE - PROCEDURE LABS HAVE BEEN ORDERED FOR YOU @ your local lab. We will call you with the location.  BE SURE TO ARRIVE AT YOUR SCHEDULED TIME FOR THIS LAB WORK!  (YOU DO NOT HAVE TO FAST FOR THIS LABWORK!!!!)      8/31/17 @ 10 AM   REPORT TO CARDIOLOGY WAITING ROOM ON 3RD FLOOR OF HOSPITAL (DO NOT REPORT TO CLINIC)  Directions for Reporting to Cardiology Waiting Area in the Hospital  If you park in the Parking Garage:  Take elevators to the 2nd floor  Walk up ramp and turn right by Gold Elevators  Take elevator to the 3rd floor  Upon exiting the elevator, turn away from the clinic areas  Walk long carlson around to front of hospital to area with windows overlooking [facility]  Check in at Reception Desk  OR  If family is dropping you off:  Have them drop you off at the front of the Hospital  (Near the ER, where all the flags are hung).  Take the E elevators to the 3rd floor.  Check in at the Reception Desk in the waiting room.        WASH YOUR CHEST WITH HIBICLENS OR AN ANTIBACTERIAL SOAP (SUCH AS DIAL) ON THE NIGHT BEFORE AND THE MORNING OF YOUR PROCEDURE.    DO NOT EAT OR DRINK ANYTHING AFTER: 12 MN ON THE NIGHT BEFORE YOUR PROCEDURE    MEDICATIONS  HOLD your diabetes medications,glimepiride (Amaryl), on the morning of your procedure. Last dose: Wednesday, August 30, 2017.  HOLD your furosemide (Lasix) and spironolactone (Aldactone) on the morning of your procedure. Last dose: Wednesday, August 30, 2017.  YOU MAY TAKE OTHER USUAL MORNING MEDICATIONS WITH A SIP OF WATER    YOU WILL BE SPENDING THE NIGHT AFTER YOUR PROCEDURE  YOU WILL NEED SOMEONE TO DRIVE YOU HOME THE DAY AFTER YOUR PROCEDURE    YOUR PAIN DURING YOUR PROCEDURE WILL BE MANAGED BY THE ANESTHESIA TEAM    THE ABOVE INSTRUCTIONS WERE GIVEN TO THE PATIENT VERBALLY AND THEY VERBALIZED UNDERSTANDING. THEY DO NOT REQUIRE ANY SPECIAL NEEDS AND DO NOT HAVE ANY LEARNING BARRIERS.      Any need to reschedule or cancel procedures, or any questions regarding your procedures should be addressed directly with the Arrhythmia Department Nurses at the following phone number: [phone]

## 2017-06-30 NOTE — PROGRESS NOTES
Post-Procedure Patient Discharge Instructions  Pacemaker/Defibrillator  Wound Care   If you are discharged with a standard dressing over the incision, you may remove the dressing after 24 hours.    If you are discharged with an AQUACEL dressing, you should keep it on until your follow-up appointment in 1-2 weeks.   If there are Steri-strips (strips of tape) over your incision, leave them on until your follow-up appointment. They may begin to fall off on their own, which is normal. If there is Dermabond (clear glue) over your incision, do not scrub it off. It acts as a barrier and will eventually disappear.   You will be discharged with 5 days of oral antibiotics. Please take the full prescription until it is gone.   Do not get the incision wet for 48 hours following the procedure. You may sponge bath during this period, working around the incision. After 48 hours, you may shower, but you should still try to keep this area as dry as possible, and avoid direct water contact to the incision (allow the water to hit back of your shoulder rather than directly on the incision). Gently pat the incision dry if it does get wet.   You may take regular showers after 2 weeks, unless otherwise indicated at your follow-up visit.   Do not submerge the incision in a tub, pool, or body of water for 6 weeks.   Avoid using deodorants, powders, creams, lotions, etc. on your incision for 6 weeks.   If you notice unusual swelling, redness, drainage, have more device site pain, chest pain, shortness of breath, or have a fever greater than 100 degrees, call our device clinic immediately: [phone] or [phone] during normal office hours. You may call [phone] after-hours or on weekends and ask for the electrophysiologist on call.  Activity    If you only had a battery/generator change performed, there are no postoperative activity restrictions.    If this is your first device or if you had new wires added to your  existing device, then you will be discharged in a sling which you should wear continuously for 48 hours. After that, the sling should remain off during the day but should be worn at night for another 2-4 weeks (depending on how active a sleeper you are).   Do not raise your device-side arm above your shoulder for 6 weeks. Do not lift more than 5-10 lbs with your device-side arm for 6 weeks.    If you were driving prior to the procedure, you may resume driving after your first follow-up appointment (1-2 weeks). If you have a history of passing out or a history of certain arrhythmias, there may be driving restrictions unrelated to the procedure. Please clarify with your physician if this is the case.   No heavy activity with the affected arm for 6 weeks (eg. tennis, golf, bowling, aerobics, mowing the lawn, etc.).   Avoid rough contact at the device site for 6 weeks.   You may participate in sexual activity unless otherwise instructed.   You may return to work within 3-5 days unless told otherwise, provided you adhere to the above activity restrictions.  Long-Term Instructions   Keep your pacemaker or defibrillator identification card with you at all times.   If you have a defibrillator and you get shocked by the device: If you receive one shock and you feel ok, you may call [phone] or [phone] during normal office hours. You may call [phone] after-hours. If you receive one shock and you do not feel well, call Emergency Medical Services. They will take you to the nearest emergency room.   If you have a defibrillator and you get more than one shock from the device or multiple shocks in a short period of time: Call Emergency Medical Services. They will take you to the nearest emergency room.   Appliances: You may operate any electrical device in your home, including microwaves.   Security Systems: Electromagnetic security systems can be located in the workplace, courthouses, or other  high-security areas. Exposure to this type of security system has been shown to cause interference in some cases. Interference may be related to the duration of exposure and/or the distance between the device and the security device. You should be aware of the location of security systems, move through them at a normal pace, and avoid leaning or standing too close.   Metal Detectors at Airports: Metal detectors at airports can potentially interfere with pacemakers or defibrillators, although this is unlikely. Metal detectors will likely be triggered by your device and therefore at places such as airports  it will be important for you to carry your identification card for the pacemaker/defibrillator. Airport personnel will likely prefer to do a manual search.   Cellular Phones: It is unlikely that using a cellular phone will interfere with your device. It should be used with the hand opposite to the side where your device was implanted. The phone should not be carried in the shirt pocket on the same side as the device.   Specific Work Conditions: Patients who work near high-voltage lines, transmitting towers, large motors, welding equipment, or powerful magnets should discuss their specific situation with their physician. In general, remain at least two feet from external electrical equipment, verify that the equipment is properly grounded, and wear insulated gloves when using electrical devices. Leave the immediate location if lightheadedness or other symptoms develop.   MRI: Some pacemakers and defibrillators are safe in MRI scanners, while others are not. Please consult with your physician to see if you have an MRI-compatible device.   Surgery: Should you require surgery in the future, some electrosurgical devices can interfere with your device function. You should discuss this with your surgeon before any operation.   Radiation Therapy: If you ever require radiation therapy in the future, care must be taken  to avoid irradiating the device.  Long-Term Follow-Up   Your device has the ability to transmit device information from home to the doctors office using a home monitoring system.   This remote system takes the place of a doctors visit. Your device will be checked from home every 3-6 months. Every 6-12 months, you will be asked to come into the office for an in-office check.   Your device should last in the range of 6-12 years. This depends on many factors including how often it paces the heart.   When the battery is low, a generator change will be performed. This is a same-day procedure with no post-op activity restrictions, unless one of the pacemaker or defibrillator leads needs to be replaced at that time, or a new lead is added to your existing system.

## 2017-06-30 NOTE — PROGRESS NOTES
Subjective:    Patient ID:  Mick Monroe is a 52 y.o. male who presents for evaluation of Chronic Systolic Heart Failure      HPI 53 yo male with ischemic cardiomyopathy, CAD, CHF, Htn, DM, CKD IV.  Primary cardiologist is Dr. West.  Also sees Dr. Lau.  Presents for consideration for ICD.  Notes reviewed.  Presented with CHF and new onset cardiomyopathy 12/17.    Echo 12/16 EF 10-15%.  [facility] 12/21/16 PCI to LAD and RCA, subtotal occlusion of Cx.  Placed on optimal medical therapy.  Echo 4/24/17 EF 25-30%  ECG reveals nsr with CO interval 200 msec, narrow QRS.  Has been wearing LifeVest since 12/16.  Notes dyspnea on exertion c/w NYHA Class II CHF.  Denies syncope, palpitations.    Review of Systems   Constitution: Negative. Negative for weakness and malaise/fatigue.   Cardiovascular: Negative for chest pain, irregular heartbeat, leg swelling, near-syncope, orthopnea, palpitations, paroxysmal nocturnal dyspnea and syncope.   Respiratory: Positive for shortness of breath. Negative for cough.    Neurological: Negative for dizziness and light-headedness.        Objective:    Physical Exam   Constitutional: He is oriented to person, place, and time. He appears well-developed and well-nourished.   Eyes: Conjunctivae are normal. No scleral icterus.   Neck: No JVD present. No tracheal deviation present.   Cardiovascular: Normal rate, regular rhythm and normal heart sounds.  PMI is not displaced.    Pulmonary/Chest: Effort normal and breath sounds normal. No respiratory distress.   Abdominal: Soft. There is no hepatosplenomegaly. There is no tenderness.   Musculoskeletal: He exhibits no edema (lower extremity) or tenderness.   Neurological: He is alert and oriented to person, place, and time.   Skin: Skin is warm and dry. No rash noted.   Psychiatric: He has a normal mood and affect. His behavior is normal.         Assessment:       1. Chronic systolic heart failure    2. Ischemic dilated cardiomyopathy    3.  Coronary artery disease involving native coronary artery of native heart without angina pectoris    4. Essential hypertension    5. Chronic kidney disease, stage 4, severely decreased GFR    6. Diabetes mellitus due to underlying condition with diabetic nephropathy, without long-term current use of insulin         Plan:           ischemic cardiomyopathy, NYHA class II CHF, EF < 35%.  Pt is a candidate for ICD for primary prevention.  Risks and benefits of ICD discussed with patient, he would like to proceed.  Informed consents obtained.  Single chamber single coil ICD.  Anesthesia (prefer MAC).  Repeat echo.

## 2017-06-30 NOTE — LETTER
June 30, 2017        Juan C West  [address] [address]  Phone: [phone]  Fax: [phone]             [facility]  [address]  [facility] [address]  Phone: [phone]   Patient: Mick Monroe   MR Number: [id number]   YOB: 1965   Date of Visit: 6/30/2017       Dear Dr. Juan C West    Thank you for referring Mick Monroe to me for evaluation. Attached you will find relevant portions of my assessment and plan of care.    If you have questions, please do not hesitate to call me. I look forward to following Mick Monroe along with you.    Sincerely,    Jai Lau MD    Enclosure    If you would like to receive this communication electronically, please contact [email or web address] or [phone] to request [employer] Link access.    [employer] Link is a tool which provides read-only access to select patient information with whom you have a relationship. Its easy to use and provides real time access to review your patients record including encounter summaries, notes, results, and demographic information.    If you feel you have received this communication in error or would no longer like to receive these types of communications, please e-mail [email or web address]

## 2017-06-30 NOTE — PROGRESS NOTES
"Subjective:   Initial evaluation of heart transplant candidacy.    HPI:  Mr. Monroe is a 52 y.o. year old  male who has presents to be considered for advanced surgical options (LVAD/OHT).     He comes from [place]. History of CAD severe three vessel disease (not a surgical candidate??)  EF 10 to 15 % by records and history of symptomatic heart failure. He also has a history of essential hypertension and diabetes with crenal disease (creatinine 2.0). He underwent coronary stents placement. Doing well at this time.  shortness of breath FC II NYHA    KONG FC II-III NYHA    Past Medical History:   Diagnosis Date    CHF (congestive heart failure)     Chronic renal disease, stage IV     Coronary atherosclerosis of unspecified type of vessel, native or graft     Diabetes mellitus     Hypertension      Past Surgical History:   Procedure Laterality Date    APPENDECTOMY         Family History   Problem Relation Age of Onset    Cancer Mother     Diabetes Father        Review of Systems   Constitution: Negative for chills, decreased appetite, diaphoresis, fever, weakness, malaise/fatigue, night sweats, weight gain and weight loss.   Cardiovascular: Positive for dyspnea on exertion. Negative for chest pain, claudication, cyanosis, irregular heartbeat, leg swelling, near-syncope, orthopnea and palpitations.   Respiratory: Negative for cough, hemoptysis, shortness of breath, sleep disturbances due to breathing, snoring, sputum production and wheezing.    Gastrointestinal: Negative for abdominal pain and diarrhea.       Objective:   Blood pressure 132/77, pulse 68, height 5' 5" (1.651 m), weight 86 kg (189 lb 9.5 oz).body mass index is 31.55 kg/m².    Physical Exam   Constitutional: He is oriented to person, place, and time. He appears well-developed and well-nourished.   HENT:   Head: Normocephalic and atraumatic.   Eyes: Conjunctivae and EOM are normal. Pupils are equal, round, and reactive to light. "   Neck: Normal range of motion. Neck supple. No JVD present.   Cardiovascular: Normal rate and regular rhythm.  Exam reveals no gallop and no friction rub.    No murmur heard.  Pulmonary/Chest: Breath sounds normal. No respiratory distress. He has no wheezes. He has no rales. He exhibits no tenderness.   Abdominal: Soft. Bowel sounds are normal. He exhibits no distension and no mass. There is no hepatosplenomegaly, splenomegaly or hepatomegaly. There is no tenderness. There is no rebound, no guarding and no CVA tenderness.   No hepatoslenomegaly   Musculoskeletal: Normal range of motion. He exhibits no edema or tenderness.   Neurological: He is alert and oriented to person, place, and time. He has normal reflexes. No cranial nerve deficit. He exhibits normal muscle tone. Coordination normal.   Skin: Skin is warm and dry.   Psychiatric: He has a normal mood and affect.       Labs:      Chemistry        Component Value Date/Time     05/09/2017 0720    K 3.7 05/09/2017 0720     05/09/2017 0720    CO2 27 05/09/2017 0720    BUN 31 (H) 05/09/2017 0720    CREATININE 2.0 (H) 05/09/2017 0720     (H) 05/09/2017 0720        Component Value Date/Time    CALCIUM 9.6 05/09/2017 0720    ALKPHOS 147 (H) 01/11/2017 1147    AST 42 (H) 01/11/2017 1147    ALT 71 (H) 01/11/2017 1147    BILITOT 0.8 01/11/2017 1147          No results found for: MG  Lab Results   Component Value Date    WBC 5.04 01/11/2017    HGB 12.1 (L) 01/11/2017    HCT 36.3 (L) 01/11/2017    MCV 86 01/11/2017     01/11/2017     BNP   Date Value Ref Range Status   05/09/2017 451 (H) 0 - 99 pg/mL Final     Comment:     Values of less than 100 pg/ml are consistent with non-CHF populations.   01/11/2017 2,840 (H) 0 - 99 pg/mL Final     Comment:     Values of less than 100 pg/ml are consistent with non-CHF populations.     No results found for this or any previous visit.    Labs were reviewed with the patient.    Assessment:      1. Coronary  artery disease involving native coronary artery of native heart without angina pectoris    2. Chronic systolic heart failure    3. Ischemic dilated cardiomyopathy    4. Chronic kidney disease, stage 4, severely decreased GFR    5. Diabetes mellitus due to underlying condition with diabetic nephropathy, without long-term current use of insulin        Plan:   ICD in August  2 D echo today  RTC 3 months      Patient is now NYHA III ACC stage C  Recommend 2 gram sodium restriction and 1500cc fluid restriction.  Encourage physical activity with graded exercise program.  Requested patient to weigh themselves daily, and to notify us if their weight increases by more than 3 lbs in 1 day or 5 lbs in 1 week.          Transplant Candidacy: Patient is a 52 y.o. year old male with heart failure is being seen for possible LVAD and OHT. In my opinion, he is  a suitable LVAD and OHT candidate. Patient did not meet with [facility]CS and/or pre-transplant coordinator at the end of this visit for workup.     UNOS Patient Status  Functional Status: 90% - Able to carry on normal activity: minor symptoms of disease  Physical Capacity: Limited Mobility  Working for Income: No  If no, reason not working: Disability    Jai Lau MD

## 2017-07-31 ENCOUNTER — TELEPHONE (OUTPATIENT)
Dept: ELECTROPHYSIOLOGY | Facility: CLINIC | Age: 52
End: 2017-07-31

## 2017-07-31 DIAGNOSIS — I50.9 CONGESTIVE HEART FAILURE, UNSPECIFIED CONGESTIVE HEART FAILURE CHRONICITY, UNSPECIFIED CONGESTIVE HEART FAILURE TYPE: Primary | ICD-10-CM

## 2017-08-18 ENCOUNTER — TELEPHONE (OUTPATIENT)
Dept: ELECTROPHYSIOLOGY | Facility: CLINIC | Age: 52
End: 2017-08-18

## 2017-08-18 NOTE — TELEPHONE ENCOUNTER
Reviewed medications to hold on morning of his procedure, verbalized understanding.    ----- Message from Kathleen Gibbons sent at 8/18/2017  3:34 PM CDT -----  Contact: pt shai/Mayra  Please call Mayra at [phone]. Has medication questions regarding future ICD insertion scheduled on 08/31/17 with Dr Cline    Thank you

## 2017-08-25 ENCOUNTER — TELEPHONE (OUTPATIENT)
Dept: ELECTROPHYSIOLOGY | Facility: CLINIC | Age: 52
End: 2017-08-25

## 2017-08-25 NOTE — TELEPHONE ENCOUNTER
Lab orders faxed to [facility] in [place] [employer], [place]. Pt notified and he verbalized understanding.      ----- Message from Kathleen Gibbons sent at 8/25/2017 11:17 AM CDT -----  Contact: patient  Please call pt at [phone]. When is labs due? ICD insertion scheduled with Dr Cline on 08/31/17    Thank you

## 2017-08-30 ENCOUNTER — TELEPHONE (OUTPATIENT)
Dept: ELECTROPHYSIOLOGY | Facility: CLINIC | Age: 52
End: 2017-08-30

## 2017-08-30 NOTE — TELEPHONE ENCOUNTER
Spoke with pt to review pre-op instructions for planned ICD in am. Pt is unable to see the labels on his pill bottles and we spent several minutes going over the meds he needs to hold (glimiperide, furosemide, and spironolactone). I am still not sure that he understood, but I spelled the names several times. States there is no one at home to read the bottles for him. Advised to fast after 12mn. He is getting transportation from [place] and leaving at 4am. Advised him to plan to stay overnight and arrange for the transportation to pick him on Friday afternoon. He states he will use the Hibiclens tonight. He states that he understands the instructions and voices no questions. Called [facility] to fax pre-op labs.

## 2017-08-31 ENCOUNTER — ANESTHESIA (OUTPATIENT)
Dept: MEDSURG UNIT | Facility: HOSPITAL | Age: 52
End: 2017-08-31
Payer: MEDICAID

## 2017-08-31 ENCOUNTER — ANESTHESIA EVENT (OUTPATIENT)
Dept: MEDSURG UNIT | Facility: HOSPITAL | Age: 52
End: 2017-08-31
Payer: MEDICAID

## 2017-08-31 ENCOUNTER — SURGERY (OUTPATIENT)
Age: 52
End: 2017-08-31

## 2017-08-31 ENCOUNTER — HOSPITAL ENCOUNTER (OUTPATIENT)
Facility: HOSPITAL | Age: 52
Discharge: HOME | End: 2017-09-01
Attending: INTERNAL MEDICINE | Admitting: INTERNAL MEDICINE
Payer: MEDICAID

## 2017-08-31 DIAGNOSIS — I49.9 ARRHYTHMIA: ICD-10-CM

## 2017-08-31 DIAGNOSIS — I42.0 ISCHEMIC DILATED CARDIOMYOPATHY: Primary | ICD-10-CM

## 2017-08-31 DIAGNOSIS — I42.9 CARDIOMYOPATHY: ICD-10-CM

## 2017-08-31 DIAGNOSIS — I25.10 ATHEROSCLEROTIC HEART DISEASE OF NATIVE CORONARY ARTERY WITHOUT ANGINA PECTORIS: ICD-10-CM

## 2017-08-31 DIAGNOSIS — I25.5 ISCHEMIC DILATED CARDIOMYOPATHY: Primary | ICD-10-CM

## 2017-08-31 LAB
ANION GAP SERPL CALC-SCNC: 9 MMOL/L
APTT BLDCRRT: 26.6 SEC
BASOPHILS # BLD AUTO: 0.02 K/UL
BASOPHILS NFR BLD: 0.3 %
BUN SERPL-MCNC: 28 MG/DL
CALCIUM SERPL-MCNC: 9.7 MG/DL
CHLORIDE SERPL-SCNC: 101 MMOL/L
CO2 SERPL-SCNC: 29 MMOL/L
CREAT SERPL-MCNC: 1.9 MG/DL
DIFFERENTIAL METHOD: ABNORMAL
EOSINOPHIL # BLD AUTO: 0.2 K/UL
EOSINOPHIL NFR BLD: 3 %
ERYTHROCYTE [DISTWIDTH] IN BLOOD BY AUTOMATED COUNT: 12.7 %
EST. GFR  (AFRICAN AMERICAN): 45.5 ML/MIN/1.73 M^2
EST. GFR  (NON AFRICAN AMERICAN): 39.4 ML/MIN/1.73 M^2
GLUCOSE SERPL-MCNC: 138 MG/DL
HCT VFR BLD AUTO: 32.5 %
HGB BLD-MCNC: 11.2 G/DL
INR PPP: 1
LYMPHOCYTES # BLD AUTO: 2.3 K/UL
LYMPHOCYTES NFR BLD: 38.3 %
MCH RBC QN AUTO: 28.4 PG
MCHC RBC AUTO-ENTMCNC: 34.5 G/DL
MCV RBC AUTO: 82 FL
MONOCYTES # BLD AUTO: 0.5 K/UL
MONOCYTES NFR BLD: 8.9 %
NEUTROPHILS # BLD AUTO: 2.9 K/UL
NEUTROPHILS NFR BLD: 49.2 %
PLATELET # BLD AUTO: 275 K/UL
PMV BLD AUTO: 9 FL
POCT GLUCOSE: 131 MG/DL (ref 70–110)
POCT GLUCOSE: 151 MG/DL (ref 70–110)
POCT GLUCOSE: 241 MG/DL (ref 70–110)
POTASSIUM SERPL-SCNC: 3.6 MMOL/L
PROTHROMBIN TIME: 11.1 SEC
RBC # BLD AUTO: 3.95 M/UL
SODIUM SERPL-SCNC: 139 MMOL/L
WBC # BLD AUTO: 5.95 K/UL

## 2017-08-31 PROCEDURE — 37000009 HC ANESTHESIA EA ADD 15 MINS: Mod: TXP | Performed by: INTERNAL MEDICINE

## 2017-08-31 PROCEDURE — 93005 ELECTROCARDIOGRAM TRACING: CPT | Mod: 59,NTX

## 2017-08-31 PROCEDURE — 85730 THROMBOPLASTIN TIME PARTIAL: CPT | Mod: NTX

## 2017-08-31 PROCEDURE — 37000008 HC ANESTHESIA 1ST 15 MINUTES: Mod: TXP | Performed by: INTERNAL MEDICINE

## 2017-08-31 PROCEDURE — 82962 GLUCOSE BLOOD TEST: CPT | Mod: NTX | Performed by: INTERNAL MEDICINE

## 2017-08-31 PROCEDURE — 63600175 PHARM REV CODE 636 W HCPCS: Mod: TXP | Performed by: NURSE ANESTHETIST, CERTIFIED REGISTERED

## 2017-08-31 PROCEDURE — 25500020 PHARM REV CODE 255: Mod: TXP

## 2017-08-31 PROCEDURE — 63600175 PHARM REV CODE 636 W HCPCS: Mod: NTX | Performed by: NURSE PRACTITIONER

## 2017-08-31 PROCEDURE — 80048 BASIC METABOLIC PNL TOTAL CA: CPT | Mod: TXP

## 2017-08-31 PROCEDURE — 63600175 PHARM REV CODE 636 W HCPCS: Mod: TXP | Performed by: NURSE PRACTITIONER

## 2017-08-31 PROCEDURE — 25000003 PHARM REV CODE 250: Mod: NTX

## 2017-08-31 PROCEDURE — 82962 GLUCOSE BLOOD TEST: CPT | Mod: TXP

## 2017-08-31 PROCEDURE — 25000003 PHARM REV CODE 250: Mod: TXP | Performed by: NURSE PRACTITIONER

## 2017-08-31 PROCEDURE — 25000003 PHARM REV CODE 250: Mod: TXP | Performed by: NURSE ANESTHETIST, CERTIFIED REGISTERED

## 2017-08-31 PROCEDURE — 63600175 PHARM REV CODE 636 W HCPCS: Mod: TXP

## 2017-08-31 PROCEDURE — D9220A PRA ANESTHESIA: Mod: CRNA,NTX,, | Performed by: NURSE ANESTHETIST, CERTIFIED REGISTERED

## 2017-08-31 PROCEDURE — 33249 INSJ/RPLCMT DEFIB W/LEAD(S): CPT | Mod: NTX,,, | Performed by: INTERNAL MEDICINE

## 2017-08-31 PROCEDURE — A4216 STERILE WATER/SALINE, 10 ML: HCPCS | Mod: TXP | Performed by: NURSE ANESTHETIST, CERTIFIED REGISTERED

## 2017-08-31 PROCEDURE — 85610 PROTHROMBIN TIME: CPT | Mod: NTX

## 2017-08-31 PROCEDURE — 27100006 EP LAB PROCEDURE: Mod: NTX

## 2017-08-31 PROCEDURE — 85025 COMPLETE CBC W/AUTO DIFF WBC: CPT | Mod: NTX

## 2017-08-31 PROCEDURE — D9220A PRA ANESTHESIA: Mod: ANES,NTX,, | Performed by: ANESTHESIOLOGY

## 2017-08-31 PROCEDURE — 93010 ELECTROCARDIOGRAM REPORT: CPT | Mod: 59,NTX,, | Performed by: INTERNAL MEDICINE

## 2017-08-31 RX ORDER — ISOSORBIDE MONONITRATE 30 MG/1
30 TABLET, EXTENDED RELEASE ORAL DAILY
Status: DISCONTINUED | OUTPATIENT
Start: 2017-09-01 | End: 2017-09-01 | Stop reason: HOSPADM

## 2017-08-31 RX ORDER — GLUCAGON 1 MG
1 KIT INJECTION
Status: DISCONTINUED | OUTPATIENT
Start: 2017-08-31 | End: 2017-09-01 | Stop reason: HOSPADM

## 2017-08-31 RX ORDER — INSULIN ASPART 100 [IU]/ML
0-5 INJECTION, SOLUTION INTRAVENOUS; SUBCUTANEOUS
Status: DISCONTINUED | OUTPATIENT
Start: 2017-08-31 | End: 2017-09-01 | Stop reason: HOSPADM

## 2017-08-31 RX ORDER — FUROSEMIDE 40 MG/1
40 TABLET ORAL DAILY
Status: DISCONTINUED | OUTPATIENT
Start: 2017-08-31 | End: 2017-09-01 | Stop reason: HOSPADM

## 2017-08-31 RX ORDER — MIDAZOLAM HYDROCHLORIDE 1 MG/ML
INJECTION, SOLUTION INTRAMUSCULAR; INTRAVENOUS
Status: DISCONTINUED | OUTPATIENT
Start: 2017-08-31 | End: 2017-09-03

## 2017-08-31 RX ORDER — FENTANYL CITRATE 50 UG/ML
INJECTION, SOLUTION INTRAMUSCULAR; INTRAVENOUS
Status: DISCONTINUED | OUTPATIENT
Start: 2017-08-31 | End: 2017-09-03

## 2017-08-31 RX ORDER — SODIUM CHLORIDE 9 MG/ML
INJECTION, SOLUTION INTRAVENOUS CONTINUOUS
Status: DISCONTINUED | OUTPATIENT
Start: 2017-08-31 | End: 2017-09-01 | Stop reason: HOSPADM

## 2017-08-31 RX ORDER — IBUPROFEN 200 MG
24 TABLET ORAL
Status: DISCONTINUED | OUTPATIENT
Start: 2017-08-31 | End: 2017-09-01 | Stop reason: HOSPADM

## 2017-08-31 RX ORDER — CEFAZOLIN SODIUM 2 G/50ML
2 SOLUTION INTRAVENOUS
Status: COMPLETED | OUTPATIENT
Start: 2017-08-31 | End: 2017-08-31

## 2017-08-31 RX ORDER — CEFAZOLIN SODIUM 2 G/50ML
2 SOLUTION INTRAVENOUS
Status: COMPLETED | OUTPATIENT
Start: 2017-08-31 | End: 2017-09-01

## 2017-08-31 RX ORDER — METOPROLOL TARTRATE 25 MG/1
25 TABLET, FILM COATED ORAL 2 TIMES DAILY
Status: DISCONTINUED | OUTPATIENT
Start: 2017-08-31 | End: 2017-09-01 | Stop reason: HOSPADM

## 2017-08-31 RX ORDER — MEPERIDINE HYDROCHLORIDE 50 MG/ML
12.5 INJECTION INTRAMUSCULAR; INTRAVENOUS; SUBCUTANEOUS ONCE AS NEEDED
Status: DISCONTINUED | OUTPATIENT
Start: 2017-08-31 | End: 2017-08-31 | Stop reason: HOSPADM

## 2017-08-31 RX ORDER — HYDRALAZINE HYDROCHLORIDE 10 MG/1
10 TABLET, FILM COATED ORAL 3 TIMES DAILY
Status: DISCONTINUED | OUTPATIENT
Start: 2017-08-31 | End: 2017-09-01 | Stop reason: HOSPADM

## 2017-08-31 RX ORDER — ASPIRIN 81 MG/1
81 TABLET ORAL DAILY
Status: DISCONTINUED | OUTPATIENT
Start: 2017-09-01 | End: 2017-09-01 | Stop reason: HOSPADM

## 2017-08-31 RX ORDER — CEPHALEXIN 250 MG/1
500 CAPSULE ORAL EVERY 12 HOURS
Status: DISCONTINUED | OUTPATIENT
Start: 2017-09-01 | End: 2017-09-01

## 2017-08-31 RX ORDER — SODIUM CHLORIDE 0.9 % (FLUSH) 0.9 %
3 SYRINGE (ML) INJECTION
Status: DISCONTINUED | OUTPATIENT
Start: 2017-08-31 | End: 2017-08-31 | Stop reason: HOSPADM

## 2017-08-31 RX ORDER — CEPHALEXIN 500 MG/1
500 CAPSULE ORAL EVERY 12 HOURS
Status: DISCONTINUED | OUTPATIENT
Start: 2017-08-31 | End: 2017-08-31

## 2017-08-31 RX ORDER — PROPOFOL 10 MG/ML
VIAL (ML) INTRAVENOUS
Status: DISCONTINUED | OUTPATIENT
Start: 2017-08-31 | End: 2017-09-03

## 2017-08-31 RX ORDER — HYDROMORPHONE HYDROCHLORIDE 1 MG/ML
0.2 INJECTION, SOLUTION INTRAMUSCULAR; INTRAVENOUS; SUBCUTANEOUS EVERY 5 MIN PRN
Status: DISCONTINUED | OUTPATIENT
Start: 2017-08-31 | End: 2017-08-31 | Stop reason: HOSPADM

## 2017-08-31 RX ORDER — ATORVASTATIN CALCIUM 20 MG/1
40 TABLET, FILM COATED ORAL NIGHTLY
Status: DISCONTINUED | OUTPATIENT
Start: 2017-08-31 | End: 2017-09-01 | Stop reason: HOSPADM

## 2017-08-31 RX ORDER — IBUPROFEN 200 MG
16 TABLET ORAL
Status: DISCONTINUED | OUTPATIENT
Start: 2017-08-31 | End: 2017-09-01 | Stop reason: HOSPADM

## 2017-08-31 RX ADMIN — ATORVASTATIN CALCIUM 40 MG: 20 TABLET, FILM COATED ORAL at 09:08

## 2017-08-31 RX ADMIN — METOPROLOL TARTRATE 25 MG: 25 TABLET ORAL at 09:08

## 2017-08-31 RX ADMIN — FENTANYL CITRATE 50 MCG: 50 INJECTION, SOLUTION INTRAMUSCULAR; INTRAVENOUS at 12:08

## 2017-08-31 RX ADMIN — HYDRALAZINE HYDROCHLORIDE 10 MG: 10 TABLET, FILM COATED ORAL at 09:08

## 2017-08-31 RX ADMIN — SODIUM CHLORIDE 1000 ML: 0.9 INJECTION, SOLUTION INTRAVENOUS at 10:08

## 2017-08-31 RX ADMIN — FENTANYL CITRATE 25 MCG: 50 INJECTION, SOLUTION INTRAMUSCULAR; INTRAVENOUS at 12:08

## 2017-08-31 RX ADMIN — PROPOFOL 40 MG: 10 INJECTION, EMULSION INTRAVENOUS at 01:08

## 2017-08-31 RX ADMIN — CEFAZOLIN SODIUM 2 G: 2 SOLUTION INTRAVENOUS at 12:08

## 2017-08-31 RX ADMIN — CEFAZOLIN SODIUM 2 G: 2 SOLUTION INTRAVENOUS at 09:08

## 2017-08-31 RX ADMIN — SPIRONOLACTONE 25 MG: 25 TABLET, FILM COATED ORAL at 06:08

## 2017-08-31 RX ADMIN — MIDAZOLAM 2 MG: 1 INJECTION INTRAMUSCULAR; INTRAVENOUS at 12:08

## 2017-08-31 RX ADMIN — FUROSEMIDE 40 MG: 40 TABLET ORAL at 06:08

## 2017-08-31 RX ADMIN — INSULIN ASPART 1 UNITS: 100 INJECTION, SOLUTION INTRAVENOUS; SUBCUTANEOUS at 09:08

## 2017-08-31 RX ADMIN — DEXMEDETOMIDINE HYDROCHLORIDE 0.5 MCG/KG/HR: 100 INJECTION, SOLUTION, CONCENTRATE INTRAVENOUS at 01:08

## 2017-08-31 NOTE — HPI
52 year old male with PMH  Of ischemic cardiomyopathy, CAD, CHF, Htn, DM, CKD IV.  Primary cardiologist is Dr. West.  Also sees Dr. Lau at Heart transplant clinic possible LVAD and OHT.   Pt presented with CHF and new onset cardiomyopathy 12/17.   Pt [facility] 12/21/16 PCI to LAD and RCA, subtotal occlusion of Cx.  Placed on optimal medical therapy including metoprolol, and entresto. Pt remains with depressed EF > recent echo revealed  25-30% ( 06/2017) . Pt presented for planned ICD implant for primary prevention.

## 2017-08-31 NOTE — ANESTHESIA PREPROCEDURE EVALUATION
08/31/2017  Mick Monroe is a 52 y.o., male.    Anesthesia Evaluation    I have reviewed the Patient Summary Reports.    I have reviewed the Nursing Notes.   I have reviewed the Medications.     Review of Systems  Anesthesia Hx:  No problems with previous Anesthesia  Denies Family Hx of Anesthesia complications.   Denies Personal Hx of Anesthesia complications.   Social:  Former Smoker Smokeless tobacco   Cardiovascular:   Hypertension CAD   CHF CONCLUSIONS     1 - Mild to moderate left ventricular enlargement.     2 - Severely depressed left ventricular systolic function (EF 25-30%).     3 - Right ventricular enlargement with mildly depressed systolic function.     4 - Biatrial enlargement.    Renal/:   Chronic Renal Disease, CRI    Endocrine:   Diabetes  Metabolic Disorders, Obesity / BMI > 30      Physical Exam   Airway/Jaw/Neck:  Airway Findings: Mouth Opening: Normal Tongue: Normal  General Airway Assessment: Adult, Good  TM Distance: Normal, at least 6 cm       Chest/Lungs:  Chest/Lungs Findings: Normal Respiratory Rate         Mental Status:  Mental Status Findings:  Cooperative, Alert and Oriented         Anesthesia Plan  Type of Anesthesia, risks & benefits discussed:  Anesthesia Type:  general, MAC  Patient's Preference: General  Intra-op Monitoring Plan: standard ASA monitors  Intra-op Monitoring Plan Comments:   Post Op Pain Control Plan:   Post Op Pain Control Plan Comments: Per primary service  Induction:   IV  Beta Blocker:  Patient is not currently on a Beta-Blocker (No further documentation required).       Informed Consent: Patient understands risks and agrees with Anesthesia plan.  Questions answered. Anesthesia consent signed with patient.  ASA Score: 4     Day of Surgery Review of History & Physical:    H&P update referred to the surgeon.         Ready For Surgery From Anesthesia  Perspective.

## 2017-08-31 NOTE — NURSING
Pt arrived to OBS 5 via stretcher from EP lab. Pt ambulated from stretcher to bed. SONIA mcintosh c/d/i arm in sling with ice pack. Pt denies pain @ this time. Respirations even and unlabored, palpable pulses. Safety precautions maintained. Bed in low position,wheels locked. Side rails up x 2. Call light within reach. Telemetry monitor in place.

## 2017-08-31 NOTE — H&P
[facility] [address]  Cardiac Electrophysiology  History and Physical     Admission Date: 8/31/2017  Code Status: No Order   Attending Provider: David Cline MD   Principal Problem:Ischemic dilated cardiomyopathy    Subjective:     Chief Complaint:  ICD implant      HPI:  52 year old male with PMH  of ischemic cardiomyopathy, CAD, CHF, Htn, DM, CKD IV.  Primary cardiologist is Dr. West.  Also sees Dr. Lau at Heart transplant clinic possible for possible LVAD and OHT.   Pt presented with CHF and new onset cardiomyopathy 12/17.   Pt [facility] 12/21/16 PCI to LAD and RCA, subtotal occlusion of Cx.  Placed on optimal medical therapy including metoprolol, and entresto. Pt remains with depressed EF > recent echo revealed  25-30% ( 06/2017) . Pt presented for planned ICD implant for primary prevention.  Pt denies any acute symptoms at present.         Past Medical History:   Diagnosis Date    Arthritis     CHF (congestive heart failure)     Chronic renal disease, stage IV     Coronary atherosclerosis of unspecified type of vessel, native or graft     Diabetes mellitus     Hypertension        Past Surgical History:   Procedure Laterality Date    APPENDECTOMY         Review of patient's allergies indicates:  No Known Allergies    No current facility-administered medications on file prior to encounter.      Current Outpatient Prescriptions on File Prior to Encounter   Medication Sig    aspirin (ECOTRIN) 81 MG EC tablet Take 81 mg by mouth Daily.    atorvastatin (LIPITOR) 40 MG tablet Take 40 mg by mouth every evening.    furosemide (LASIX) 40 MG tablet Take 40 mg by mouth Daily.    glimepiride (AMARYL) 1 MG tablet Take 2 mg by mouth Daily.     hydrALAZINE (APRESOLINE) 10 MG tablet Take 10 mg by mouth 3 (three) times daily.    isosorbide mononitrate (IMDUR) 30 MG 24 hr tablet Take 30 mg by mouth Daily.    metoprolol tartrate (LOPRESSOR) 25 MG tablet Take 25 mg by mouth 2 (two) times daily.      sacubitril-valsartan (ENTRESTO) 24-26 mg per tablet Take 1 tablet by mouth 2 (two) times daily.    spironolactone (ALDACTONE) 25 MG tablet Take 25 mg by mouth Daily.     Family History     Problem Relation (Age of Onset)    Cancer Mother    Diabetes Father        Social History Main Topics    Smoking status: Former Smoker    Smokeless tobacco: Current User     Types: Chew    Alcohol use No    Drug use: No    Sexual activity: Not on file     ROS   Constitution: Negative for fevers/chills.   Positive for easily gets    weak and has malaise/fatigue.   HENT: Negative for ear pain and tinnitus.   Eyes: Negative for blurred vision.   Cardiovascular: Negative for chest pain,  near-syncope, and syncope.   Respiratory:  Positive for shortness of breath on exertion   Endocrine:  Negative for polyuria.   Hematologic/Lymphatic: Negative for bruise/bleed easily.   Skin:  Negative for rash.   Musculoskeletal: Negative for joint pain and muscle weakness.   Extremity: Negative for swelling in the lower extremities.   Gastrointestinal:  Negative for abdominal pain and change in bowel habit.   Genitourinary: Negative for frequency.   Neurological:  Negative for dizziness.   Psychiatric/Behavioral:  Negative for depression, anxiety           Objective:     Vital Signs (Most Recent):  Temp: 97.2 °F (36.2 °C) (08/31/17 1003)  Pulse: 77 (08/31/17 1003)  Resp: 18 (08/31/17 1003)  BP: (!) 142/86 (08/31/17 1019)  SpO2: 97 % (08/31/17 1003) Vital Signs (24h Range):  Temp:  [97.2 °F (36.2 °C)] 97.2 °F (36.2 °C)  Pulse:  [77] 77  Resp:  [18] 18  SpO2:  [97 %] 97 %  BP: (141-142)/(83-86) 142/86     Weight: 84.4 kg (186 lb)  Body mass index is 30.95 kg/m².    SpO2: 97 %  O2 Device (Oxygen Therapy): room air    Physical Exam   General: Well developed, well nourished, No distress on room air   HEENT: Head is normocephalic, atraumatic;  Lungs: Clear to auscultation bilaterally.  No wheezing. Normal respiratory effort.  Cardiovascular:  S1 S2  Normal rate, regular rhythm and normal heart sounds.    Extremities: No LE edema. Pulses 2+ and symmetric.   Abdomen: Abdomen is soft, non-tender non-distended with normal bowel sounds.  Skin: Skin color, texture, turgor normal. No rashes.  Musculoskeletal: normal range of motion   Neurologic: Normal strength and tone. No focal numbness or weakness.   Psychiatric: normal mood and affect.  behavior is normal. Alert and oriented X 3.  Labs reviewed       Significant Labs: reviewed     Significant Imaging: reviewed   06/30/17   CONCLUSIONS     1 - Mild to moderate left ventricular enlargement.     2 - Severely depressed left ventricular systolic function (EF 25-30%).     3 - Right ventricular enlargement with mildly depressed systolic function.     4 - Biatrial enlargement.       This document has been electronically    SIGNED BY: Walter Moon MD On: 06/30/2017 12:25    Assessment and Plan:   52 year old male with PMH of  ischemic cardiomyopathy, CAD, CHF, Htn, DM, CKD IV. Placed on optimal medical therapy including metoprolol, and entresto. Pt remains with depressed EF > recent echo revealed  25-30% ( 06/2017) .    * Ischemic dilated cardiomyopathy    ICD implant for primary prevention.  Anesthesia (prefer MAC).                   Prior to procedure, extensive discussion with patient regarding risks and benefits of ICD Implant. The patient  voices understanding and all questions have been answered. No further questions/concerns voiced at this time.         BHARATH Albrecht-BC  Cardiology Electrophysiology  NP [facility]    Attending:  MD Marlyn Hernandez

## 2017-08-31 NOTE — PROGRESS NOTES
Vss, pt resting quietly, no complaints, unable to reach patients family for update, awaiting floor bed, continue to monitor.

## 2017-08-31 NOTE — SUBJECTIVE & OBJECTIVE
Past Medical History:   Diagnosis Date    Arthritis     CHF (congestive heart failure)     Chronic renal disease, stage IV     Coronary atherosclerosis of unspecified type of vessel, native or graft     Diabetes mellitus     Hypertension        Past Surgical History:   Procedure Laterality Date    APPENDECTOMY         Review of patient's allergies indicates:  No Known Allergies    No current facility-administered medications on file prior to encounter.      Current Outpatient Prescriptions on File Prior to Encounter   Medication Sig    aspirin (ECOTRIN) 81 MG EC tablet Take 81 mg by mouth Daily.    atorvastatin (LIPITOR) 40 MG tablet Take 40 mg by mouth every evening.    furosemide (LASIX) 40 MG tablet Take 40 mg by mouth Daily.    glimepiride (AMARYL) 1 MG tablet Take 2 mg by mouth Daily.     hydrALAZINE (APRESOLINE) 10 MG tablet Take 10 mg by mouth 3 (three) times daily.    isosorbide mononitrate (IMDUR) 30 MG 24 hr tablet Take 30 mg by mouth Daily.    metoprolol tartrate (LOPRESSOR) 25 MG tablet Take 25 mg by mouth 2 (two) times daily.     sacubitril-valsartan (ENTRESTO) 24-26 mg per tablet Take 1 tablet by mouth 2 (two) times daily.    spironolactone (ALDACTONE) 25 MG tablet Take 25 mg by mouth Daily.     Family History     Problem Relation (Age of Onset)    Cancer Mother    Diabetes Father        Social History Main Topics    Smoking status: Former Smoker    Smokeless tobacco: Current User     Types: Chew    Alcohol use No    Drug use: No    Sexual activity: Not on file     ROS   Constitution: Negative for fevers/chills.   Positive for easily gets    weak and has malaise/fatigue.   HENT: Negative for ear pain and tinnitus.   Eyes: Negative for blurred vision.   Cardiovascular: Negative for chest pain,  near-syncope, and syncope.   Respiratory:  Positive for shortness of breath on exertion   Endocrine:  Negative for polyuria.   Hematologic/Lymphatic: Negative for bruise/bleed easily.   Skin:   Negative for rash.   Musculoskeletal: Negative for joint pain and muscle weakness.   Extremity: Negative for swelling in the lower extremities.   Gastrointestinal:  Negative for abdominal pain and change in bowel habit.   Genitourinary: Negative for frequency.   Neurological:  Negative for dizziness.   Psychiatric/Behavioral:  Negative for depression, anxiety           Objective:     Vital Signs (Most Recent):  Temp: 97.2 °F (36.2 °C) (08/31/17 1003)  Pulse: 77 (08/31/17 1003)  Resp: 18 (08/31/17 1003)  BP: (!) 142/86 (08/31/17 1019)  SpO2: 97 % (08/31/17 1003) Vital Signs (24h Range):  Temp:  [97.2 °F (36.2 °C)] 97.2 °F (36.2 °C)  Pulse:  [77] 77  Resp:  [18] 18  SpO2:  [97 %] 97 %  BP: (141-142)/(83-86) 142/86     Weight: 84.4 kg (186 lb)  Body mass index is 30.95 kg/m².    SpO2: 97 %  O2 Device (Oxygen Therapy): room air    Physical Exam   General: Well developed, well nourished, No distress on room air   HEENT: Head is normocephalic, atraumatic;  Lungs: Clear to auscultation bilaterally.  No wheezing. Normal respiratory effort.  Cardiovascular:  S1 S2 Normal rate, regular rhythm and normal heart sounds.    Extremities: No LE edema. Pulses 2+ and symmetric.   Abdomen: Abdomen is soft, non-tender non-distended with normal bowel sounds.  Skin: Skin color, texture, turgor normal. No rashes.  Musculoskeletal: normal range of motion   Neurologic: Normal strength and tone. No focal numbness or weakness.   Psychiatric: normal mood and affect.  behavior is normal. Alert and oriented X 3.  Labs reviewed       Significant Labs: reviewed     Significant Imaging: reviewed   06/30/17   CONCLUSIONS     1 - Mild to moderate left ventricular enlargement.     2 - Severely depressed left ventricular systolic function (EF 25-30%).     3 - Right ventricular enlargement with mildly depressed systolic function.     4 - Biatrial enlargement.       This document has been electronically    SIGNED BY: Walter Moon MD On: 06/30/2017  12:25

## 2017-08-31 NOTE — NURSING TRANSFER
Nursing Transfer Note      8/31/2017     Transfer To: [facility]    Transfer via stretcher    Transfer with cardiac monitoring    Transported by escort    Medicines sent: none    Chart send with patient: Yes    Notified: attempted to notify family but unsuccessful    Patient reassessed at: 8/31/2017 @ 1600 (date, time)

## 2017-08-31 NOTE — PROGRESS NOTES
Vss, report called to receiving nurse Belinda rn, pt resting quietly, no complaints, continue to monitor.

## 2017-09-01 VITALS
RESPIRATION RATE: 18 BRPM | HEIGHT: 65 IN | TEMPERATURE: 98 F | WEIGHT: 186.06 LBS | OXYGEN SATURATION: 97 % | HEART RATE: 73 BPM | SYSTOLIC BLOOD PRESSURE: 139 MMHG | BODY MASS INDEX: 31 KG/M2 | DIASTOLIC BLOOD PRESSURE: 93 MMHG

## 2017-09-01 LAB — POCT GLUCOSE: 145 MG/DL (ref 70–110)

## 2017-09-01 PROCEDURE — 25000003 PHARM REV CODE 250: Mod: NTX | Performed by: STUDENT IN AN ORGANIZED HEALTH CARE EDUCATION/TRAINING PROGRAM

## 2017-09-01 PROCEDURE — 25000003 PHARM REV CODE 250: Mod: NTX | Performed by: NURSE PRACTITIONER

## 2017-09-01 PROCEDURE — 82962 GLUCOSE BLOOD TEST: CPT | Mod: TXP

## 2017-09-01 PROCEDURE — 63600175 PHARM REV CODE 636 W HCPCS: Mod: TXP | Performed by: NURSE PRACTITIONER

## 2017-09-01 RX ORDER — ACETAMINOPHEN 325 MG/1
650 TABLET ORAL EVERY 6 HOURS PRN
Status: DISCONTINUED | OUTPATIENT
Start: 2017-09-01 | End: 2017-09-01 | Stop reason: HOSPADM

## 2017-09-01 RX ORDER — CEPHALEXIN 500 MG/1
500 CAPSULE ORAL EVERY 12 HOURS
Qty: 10 CAPSULE | Refills: 0 | Status: SHIPPED | OUTPATIENT
Start: 2017-09-01 | End: 2017-09-01 | Stop reason: HOSPADM

## 2017-09-01 RX ADMIN — FUROSEMIDE 40 MG: 40 TABLET ORAL at 05:09

## 2017-09-01 RX ADMIN — CEFAZOLIN SODIUM 2 G: 2 SOLUTION INTRAVENOUS at 05:09

## 2017-09-01 RX ADMIN — SPIRONOLACTONE 25 MG: 25 TABLET, FILM COATED ORAL at 05:09

## 2017-09-01 RX ADMIN — ISOSORBIDE MONONITRATE 30 MG: 30 TABLET, EXTENDED RELEASE ORAL at 05:09

## 2017-09-01 RX ADMIN — SACUBITRIL AND VALSARTAN 1 TABLET: 24; 26 TABLET, FILM COATED ORAL at 10:09

## 2017-09-01 RX ADMIN — ASPIRIN 81 MG: 81 TABLET, COATED ORAL at 05:09

## 2017-09-01 RX ADMIN — ACETAMINOPHEN 650 MG: 325 TABLET ORAL at 05:09

## 2017-09-01 RX ADMIN — HYDRALAZINE HYDROCHLORIDE 10 MG: 10 TABLET, FILM COATED ORAL at 05:09

## 2017-09-01 RX ADMIN — METOPROLOL TARTRATE 25 MG: 25 TABLET ORAL at 09:09

## 2017-09-01 NOTE — NURSING
Pt dc per MD orders. Dc instructions given. Pt verbalizes understanding. piv removed, catheter tip intact. VSS. No sign of distress noted.

## 2017-09-01 NOTE — PLAN OF CARE
arranged transportation through medicaid transportation at [phone] to  pt today before 2:00pm.  Pt will return home to [place], [place].  Confirmation number is #[id number].

## 2017-09-01 NOTE — PLAN OF CARE
Problem: Cardiac Rhythm Management Device (Adult)  Intervention: Prevent/Manage DVT/VTE Risk  VTE precautions maintained throughout the shift

## 2017-09-01 NOTE — PLAN OF CARE
Problem: Patient Care Overview  Goal: Plan of Care Review  Outcome: Outcome(s) achieved Date Met: 09/01/17  POC reviewed with pt. Safety precautions maintained. Bed in low position,wheels locked. Side rails up x 2.  SONIA velma c/d/i arm in sling. Pt denies pain. Telemetry monitor in place.

## 2017-09-01 NOTE — DISCHARGE SUMMARY
[facility]  Cardiac Electrophysiology  Discharge Summary      Patient Name: Mick Monroe  MRN: [id number]  Admission Date: 8/31/2017  Hospital Length of Stay: 0 days  Discharge Date and Time:  09/01/2017 8:22 AM  Attending Physician: David Cline MD    Discharging Provider: Darcy Baptiste NP  Primary Care Physician: Primary Doctor No    HPI:  52 year old male with PMH  of ischemic cardiomyopathy, CAD, CHF, Htn, DM, CKD IV.  Primary cardiologist is Dr. West.  Also sees Dr. Lau at Heart transplant clinic possible for possible LVAD and OHT.   Pt presented with CHF and new onset cardiomyopathy 12/17.   Pt [facility] 12/21/16 PCI to LAD and RCA, subtotal occlusion of Cx.  Placed on optimal medical therapy including metoprolol, and entresto. Pt remains with depressed EF > recent echo revealed  25-30% ( 06/2017) . Pt presented for planned ICD implant for primary prevention.       Procedure(s) (LRB):  INSERTION-ICD-SINGLE (N/A)       Hospital Course:  Pt underwent  ICD implant tolerated procedure, no acute complications noted. Left pectoral site with steristrips  dressing CDI, no bleeding or hematoma noted.    Pt to follow up with device clinic in 1 week for wound check , and 3 months with MD David Cline   Discharge plans/instructions discussed with patient who verbalized understanding and agreement of plans of care.  No further questions or concerns  voiced at this time.     Consults: anesthesia     Final Active Diagnoses:    Diagnosis Date Noted POA    PRINCIPAL PROBLEM:  Ischemic dilated cardiomyopathy [I42.0, I25.5] 06/30/2017 Yes      Problems Resolved During this Admission:    Diagnosis Date Noted Date Resolved POA     Discharged Condition: good    Disposition: Home or Self Care    Follow Up:  Follow-up Information     [facility] ARRHYTHMIA In 1 week.    Specialty:  Arrhythmia  Why:  For wound re-check  Contact information:  [address]  [facility] [address] [phone]            David Cline MD In 3 months.    Specialties:  Electrophysiology, Cardiology  Contact information:  [address]  [place] [address]  [phone]                 Patient Instructions:     Diet general   Order Specific Question Answer Comments   Additional restrictions: Cardiac (Low Na/Chol)      Call MD for:  temperature >100.4     Call MD for:  persistent nausea and vomiting or diarrhea     Call MD for:  severe uncontrolled pain     Call MD for:  redness, tenderness, or signs of infection (pain, swelling, redness, odor or green/yellow discharge around incision site)     Call MD for:  difficulty breathing or increased cough     Call MD for:  severe persistent headache     Call MD for:  worsening rash     Call MD for:  persistent dizziness, light-headedness, or visual disturbances     Call MD for:  increased confusion or weakness     Call MD for:   Scheduling Instructions: For any concerning medical symptoms     Leave dressing on - Keep it clean, dry, and intact until clinic visit     Other restrictions (specify):   Scheduling Instructions: Instructions to patient:  - We have implanted a defibrillator on this admission, please take the following precautions in the days/weeks following your procedure  - Please refer to the written post procedure hand out that was given to you.   - For the first 6 weeks, while your implanted leads become permanently fixed, we ask that you avoid raising your left elbow above your shoulder,  and lifting greater than 5-10 lbs.   - We will schedule a visit to our wound clinic 1 week after your procedure for close follow up, in the interim period please keep your wound as clean & dry as possible, If you notice any discharge,worsening tenderness or discomfort from the area please call our clinic as soon as possible>  Telephone [phone] > or main hospital number  to be directed to Device Clinic [phone].   - Follow with Dr David Cline  in 3  months post procedure        Medications:  Reconciled Home Medications:   Current Discharge Medication List      CONTINUE these medications which have NOT CHANGED    Details   aspirin (ECOTRIN) 81 MG EC tablet Take 81 mg by mouth Daily.      atorvastatin (LIPITOR) 40 MG tablet Take 40 mg by mouth every evening.      furosemide (LASIX) 40 MG tablet Take 40 mg by mouth Daily.      glimepiride (AMARYL) 1 MG tablet Take 2 mg by mouth Daily.       hydrALAZINE (APRESOLINE) 10 MG tablet Take 10 mg by mouth 3 (three) times daily.      isosorbide mononitrate (IMDUR) 30 MG 24 hr tablet Take 30 mg by mouth Daily.      metoprolol tartrate (LOPRESSOR) 25 MG tablet Take 25 mg by mouth 2 (two) times daily.       sacubitril-valsartan (ENTRESTO) 24-26 mg per tablet Take 1 tablet by mouth 2 (two) times daily.      spironolactone (ALDACTONE) 25 MG tablet Take 25 mg by mouth Daily.           BHARATH Albrecht-BC  Cardiology Electrophysiology  NP   [facility]    Attending: MD David Cline

## 2017-09-05 ENCOUNTER — OFFICE VISIT (OUTPATIENT)
Dept: TRANSPLANT | Facility: CLINIC | Age: 52
End: 2017-09-05
Payer: MEDICAID

## 2017-09-05 ENCOUNTER — LAB VISIT (OUTPATIENT)
Dept: LAB | Facility: HOSPITAL | Age: 52
End: 2017-09-05
Attending: INTERNAL MEDICINE
Payer: MEDICAID

## 2017-09-05 VITALS
HEIGHT: 65 IN | WEIGHT: 187.38 LBS | HEART RATE: 60 BPM | BODY MASS INDEX: 31.22 KG/M2 | DIASTOLIC BLOOD PRESSURE: 78 MMHG | SYSTOLIC BLOOD PRESSURE: 138 MMHG

## 2017-09-05 DIAGNOSIS — I25.5 ISCHEMIC DILATED CARDIOMYOPATHY: ICD-10-CM

## 2017-09-05 DIAGNOSIS — I25.10 CORONARY ARTERY DISEASE INVOLVING NATIVE CORONARY ARTERY OF NATIVE HEART WITHOUT ANGINA PECTORIS: Primary | ICD-10-CM

## 2017-09-05 DIAGNOSIS — I50.9 CONGESTIVE HEART FAILURE, UNSPECIFIED CONGESTIVE HEART FAILURE CHRONICITY, UNSPECIFIED CONGESTIVE HEART FAILURE TYPE: ICD-10-CM

## 2017-09-05 DIAGNOSIS — I42.9 CARDIOMYOPATHY, UNSPECIFIED TYPE: ICD-10-CM

## 2017-09-05 DIAGNOSIS — I50.22 CHRONIC SYSTOLIC HEART FAILURE: ICD-10-CM

## 2017-09-05 DIAGNOSIS — N18.4 CHRONIC KIDNEY DISEASE, STAGE 4, SEVERELY DECREASED GFR: ICD-10-CM

## 2017-09-05 DIAGNOSIS — I42.0 ISCHEMIC DILATED CARDIOMYOPATHY: ICD-10-CM

## 2017-09-05 DIAGNOSIS — E08.21 DIABETES MELLITUS DUE TO UNDERLYING CONDITION WITH DIABETIC NEPHROPATHY, WITHOUT LONG-TERM CURRENT USE OF INSULIN: ICD-10-CM

## 2017-09-05 LAB
ANION GAP SERPL CALC-SCNC: 7 MMOL/L
BNP SERPL-MCNC: 245 PG/ML
BUN SERPL-MCNC: 31 MG/DL
CALCIUM SERPL-MCNC: 9.5 MG/DL
CHLORIDE SERPL-SCNC: 101 MMOL/L
CO2 SERPL-SCNC: 29 MMOL/L
CREAT SERPL-MCNC: 1.9 MG/DL
EST. GFR  (AFRICAN AMERICAN): 45.5 ML/MIN/1.73 M^2
EST. GFR  (NON AFRICAN AMERICAN): 39.4 ML/MIN/1.73 M^2
GLUCOSE SERPL-MCNC: 101 MG/DL
POTASSIUM SERPL-SCNC: 4 MMOL/L
SODIUM SERPL-SCNC: 137 MMOL/L

## 2017-09-05 PROCEDURE — 3078F DIAST BP <80 MM HG: CPT | Mod: NTX,,, | Performed by: INTERNAL MEDICINE

## 2017-09-05 PROCEDURE — 3075F SYST BP GE 130 - 139MM HG: CPT | Mod: NTX,,, | Performed by: INTERNAL MEDICINE

## 2017-09-05 PROCEDURE — 36415 COLL VENOUS BLD VENIPUNCTURE: CPT | Mod: TXP

## 2017-09-05 PROCEDURE — 99999 PR PBB SHADOW E&M-EST. PATIENT-LVL III: CPT | Mod: PBBFAC,TXP,, | Performed by: INTERNAL MEDICINE

## 2017-09-05 PROCEDURE — 3008F BODY MASS INDEX DOCD: CPT | Mod: NTX,,, | Performed by: INTERNAL MEDICINE

## 2017-09-05 PROCEDURE — 99213 OFFICE O/P EST LOW 20 MIN: CPT | Mod: PBBFAC,TXP | Performed by: INTERNAL MEDICINE

## 2017-09-05 PROCEDURE — 83880 ASSAY OF NATRIURETIC PEPTIDE: CPT | Mod: TXP

## 2017-09-05 PROCEDURE — 80048 BASIC METABOLIC PNL TOTAL CA: CPT | Mod: TXP

## 2017-09-05 PROCEDURE — 99215 OFFICE O/P EST HI 40 MIN: CPT | Mod: S$PBB,NTX,, | Performed by: INTERNAL MEDICINE

## 2017-09-05 RX ORDER — PREDNISOLONE ACETATE 10 MG/ML
1 SUSPENSION/ DROPS OPHTHALMIC
COMMUNITY
End: 2017-09-05

## 2017-09-05 NOTE — PROGRESS NOTES
"Subjective:   Initial evaluation of heart transplant candidacy.    HPI:  Mr. Monroe is a 52 y.o. year old  male who has presents to be considered for advanced surgical options (LVAD/OHT).     He comes from [place]. History of CAD severe three vessel disease (not a surgical candidate??)  EF 10 to 15 % by records and history of symptomatic heart failure. He also has a history of essential hypertension and diabetes with renal disease (creatinine 2.0). He underwent coronary stents placement. Doing well at this time.  shortness of breath FC II NYHA    KONG FC II-III NYHA . Underwent placement of ICD    Past Medical History:   Diagnosis Date    Arthritis     CHF (congestive heart failure)     Chronic renal disease, stage IV     Coronary atherosclerosis of unspecified type of vessel, native or graft     Diabetes mellitus     Hypertension      Past Surgical History:   Procedure Laterality Date    APPENDECTOMY         Family History   Problem Relation Age of Onset    Cancer Mother     Diabetes Father        Review of Systems   Constitution: Negative for chills, decreased appetite, diaphoresis, fever, weakness, malaise/fatigue, night sweats, weight gain and weight loss.   Cardiovascular: Positive for dyspnea on exertion. Negative for chest pain, claudication, cyanosis, irregular heartbeat, leg swelling, near-syncope, orthopnea and palpitations.   Respiratory: Negative for cough, hemoptysis, shortness of breath, sleep disturbances due to breathing, snoring, sputum production and wheezing.    Gastrointestinal: Negative for abdominal pain and diarrhea.       Objective:   Blood pressure 138/78, pulse 60, height 5' 5" (1.651 m), weight 85 kg (187 lb 6.3 oz).body mass index is 31.18 kg/m².    Physical Exam   Constitutional: He is oriented to person, place, and time. He appears well-developed and well-nourished.   HENT:   Head: Normocephalic and atraumatic.   Eyes: Conjunctivae and EOM are normal. Pupils are " equal, round, and reactive to light.   Neck: Normal range of motion. Neck supple. No JVD present.   Cardiovascular: Normal rate and regular rhythm.  Exam reveals no gallop and no friction rub.    No murmur heard.  Pulmonary/Chest: Breath sounds normal. No respiratory distress. He has no wheezes. He has no rales. He exhibits no tenderness.   Abdominal: Soft. Bowel sounds are normal. He exhibits no distension and no mass. There is no hepatosplenomegaly, splenomegaly or hepatomegaly. There is no tenderness. There is no rebound, no guarding and no CVA tenderness.   No hepatoslenomegaly   Musculoskeletal: Normal range of motion. He exhibits no edema or tenderness.   Neurological: He is alert and oriented to person, place, and time. He has normal reflexes. No cranial nerve deficit. He exhibits normal muscle tone. Coordination normal.   Skin: Skin is warm and dry.   Psychiatric: He has a normal mood and affect.       Labs:      Chemistry        Component Value Date/Time     08/31/2017 0951    K 3.6 08/31/2017 0951     08/31/2017 0951    CO2 29 08/31/2017 0951    BUN 28 (H) 08/31/2017 0951    CREATININE 1.9 (H) 08/31/2017 0951     (H) 08/31/2017 0951        Component Value Date/Time    CALCIUM 9.7 08/31/2017 0951    ALKPHOS 147 (H) 01/11/2017 1147    AST 42 (H) 01/11/2017 1147    ALT 71 (H) 01/11/2017 1147    BILITOT 0.8 01/11/2017 1147          No results found for: MG  Lab Results   Component Value Date    WBC 5.95 08/31/2017    HGB 11.2 (L) 08/31/2017    HCT 32.5 (L) 08/31/2017    MCV 82 08/31/2017     08/31/2017     BNP   Date Value Ref Range Status   05/09/2017 451 (H) 0 - 99 pg/mL Final     Comment:     Values of less than 100 pg/ml are consistent with non-CHF populations.   01/11/2017 2,840 (H) 0 - 99 pg/mL Final     Comment:     Values of less than 100 pg/ml are consistent with non-CHF populations.     No results found for this or any previous visit.    Labs were reviewed with the  patient.    Assessment:      1. Coronary artery disease involving native coronary artery of native heart without angina pectoris    2. Chronic systolic heart failure    3. Ischemic dilated cardiomyopathy    4. Cardiomyopathy, unspecified type    5. Chronic kidney disease, stage 4, severely decreased GFR    6. Diabetes mellitus due to underlying condition with diabetic nephropathy, without long-term current use of insulin        Plan:   ICD placement. COntinue same plan for now    RTC 3 months    Patient is now NYHA III ACC stage C  Recommend 2 gram sodium restriction and 1500cc fluid restriction.  Encourage physical activity with graded exercise program.  Requested patient to weigh themselves daily, and to notify us if their weight increases by more than 3 lbs in 1 day or 5 lbs in 1 week.          Transplant Candidacy: Patient is a 52 y.o. year old male with heart failure is being seen for possible LVAD and OHT. In my opinion, he is  a suitable LVAD and OHT candidate. Patient did not meet with MCS and/or pre-transplant coordinator at the end of this visit for workup.     UNOS Patient Status  Functional Status: 90% - Able to carry on normal activity: minor symptoms of disease  Physical Capacity: Limited Mobility  Working for Income: No  If no, reason not working: Disability    Jai Lau MD

## 2017-09-05 NOTE — LETTER
September 5, 2017        Juan C West  [address] [address]  Phone: [phone]  Fax: [phone]             [facility]  [address]  [facility] [address]  Phone: [phone]   Patient: Mick Monroe   MR Number: [id number]   YOB: 1965   Date of Visit: 9/5/2017       Dear Dr. Juan C West    Thank you for referring Mick Monroe to me for evaluation. Attached you will find relevant portions of my assessment and plan of care.    If you have questions, please do not hesitate to call me. I look forward to following Mick Monroe along with you.    Sincerely,    Jai Lau MD    Enclosure    If you would like to receive this communication electronically, please contact [email or web address] or [phone] to request [employer] Link access.    [employer] Link is a tool which provides read-only access to select patient information with whom you have a relationship. Its easy to use and provides real time access to review your patients record including encounter summaries, notes, results, and demographic information.    If you feel you have received this communication in error or would no longer like to receive these types of communications, please e-mail [email or web address]

## 2017-09-06 DIAGNOSIS — Z95.810 AUTOMATIC IMPLANTABLE CARDIAC DEFIBRILLATOR IN SITU: ICD-10-CM

## 2017-09-06 DIAGNOSIS — I42.9 CARDIOMYOPATHY, UNSPECIFIED TYPE: Primary | ICD-10-CM

## 2017-09-06 NOTE — ANESTHESIA POSTPROCEDURE EVALUATION
"Anesthesia Post Evaluation    Patient: Mick Monroe    Procedure(s) Performed: Procedure(s) (LRB):  INSERTION-ICD-SINGLE (N/A)    Final Anesthesia Type: general  Patient location during evaluation: PACU  Patient participation: Yes- Able to Participate  Level of consciousness: awake and alert  Post-procedure vital signs: reviewed and stable  Pain management: adequate  Airway patency: patent  PONV status at discharge: No PONV  Anesthetic complications: no      Cardiovascular status: blood pressure returned to baseline and hemodynamically stable  Respiratory status: unassisted and spontaneous ventilation  Hydration status: euvolemic  Follow-up not needed.        Visit Vitals  BP (!) 139/93 (BP Location: Right arm, Patient Position: Sitting)   Pulse 73   Temp 36.8 °C (98.2 °F) (Oral)   Resp 18   Ht 5' 5" (1.651 m)   Wt 84.4 kg (186 lb 1.1 oz)   SpO2 97%   BMI 30.96 kg/m²       Pain/Shreyas Score: No Data Recorded      "

## 2017-09-14 ENCOUNTER — CLINICAL SUPPORT (OUTPATIENT)
Dept: ELECTROPHYSIOLOGY | Facility: CLINIC | Age: 52
End: 2017-09-14
Payer: MEDICAID

## 2017-09-14 DIAGNOSIS — I42.9 CARDIOMYOPATHY, UNSPECIFIED TYPE: ICD-10-CM

## 2017-09-14 DIAGNOSIS — Z95.810 AUTOMATIC IMPLANTABLE CARDIAC DEFIBRILLATOR IN SITU: ICD-10-CM

## 2017-09-14 PROCEDURE — 93282 PRGRMG EVAL IMPLANTABLE DFB: CPT | Mod: PBBFAC,NTX | Performed by: INTERNAL MEDICINE

## 2017-12-04 ENCOUNTER — TELEPHONE (OUTPATIENT)
Dept: TRANSPLANT | Facility: CLINIC | Age: 52
End: 2017-12-04

## 2017-12-04 NOTE — TELEPHONE ENCOUNTER
----- Message from Mary Grace Barnes sent at 12/4/2017  8:17 AM CST -----  Contact: Pt can be reached at [phone]  Please contact pt to reschedule his appts.  He was supposed to be in clinic this morning.     mary grace  ----- Message -----  From: Kierra Georges  Sent: 12/4/2017   8:09 AM  To: [facility] Staff    Pt is calling to reschedule appt, pt transportation came late. Pt is requesting to reschedule appt.      Thank you!

## 2017-12-05 ENCOUNTER — TELEPHONE (OUTPATIENT)
Dept: ELECTROPHYSIOLOGY | Facility: CLINIC | Age: 52
End: 2017-12-05

## 2017-12-05 NOTE — TELEPHONE ENCOUNTER
Called pt to inform him of December book outs for Karlene.  LM on VM.  Moved up device appt ant R/S Karlene's and ekg.  Reminder letter sent.

## 2017-12-15 DIAGNOSIS — Z95.810 ICD (IMPLANTABLE CARDIOVERTER-DEFIBRILLATOR) IN PLACE: Primary | ICD-10-CM

## 2017-12-15 DIAGNOSIS — I42.9 CARDIOMYOPATHY, UNSPECIFIED TYPE: ICD-10-CM

## 2017-12-19 ENCOUNTER — OFFICE VISIT (OUTPATIENT)
Dept: TRANSPLANT | Facility: CLINIC | Age: 52
End: 2017-12-19
Payer: MEDICAID

## 2017-12-19 ENCOUNTER — CLINICAL SUPPORT (OUTPATIENT)
Dept: ELECTROPHYSIOLOGY | Facility: CLINIC | Age: 52
End: 2017-12-19
Payer: MEDICAID

## 2017-12-19 ENCOUNTER — LAB VISIT (OUTPATIENT)
Dept: LAB | Facility: HOSPITAL | Age: 52
End: 2017-12-19
Attending: INTERNAL MEDICINE
Payer: MEDICAID

## 2017-12-19 VITALS
HEIGHT: 65 IN | DIASTOLIC BLOOD PRESSURE: 73 MMHG | BODY MASS INDEX: 31 KG/M2 | HEART RATE: 65 BPM | SYSTOLIC BLOOD PRESSURE: 127 MMHG | WEIGHT: 186.06 LBS

## 2017-12-19 DIAGNOSIS — M79.89 SWELLING OF LEFT HAND: ICD-10-CM

## 2017-12-19 DIAGNOSIS — I25.5 ISCHEMIC DILATED CARDIOMYOPATHY: ICD-10-CM

## 2017-12-19 DIAGNOSIS — I10 ESSENTIAL HYPERTENSION: ICD-10-CM

## 2017-12-19 DIAGNOSIS — I25.10 CORONARY ARTERY DISEASE INVOLVING NATIVE CORONARY ARTERY OF NATIVE HEART WITHOUT ANGINA PECTORIS: ICD-10-CM

## 2017-12-19 DIAGNOSIS — I50.22 CHRONIC SYSTOLIC HEART FAILURE: Primary | ICD-10-CM

## 2017-12-19 DIAGNOSIS — Z95.810 IMPLANTABLE CARDIOVERTER-DEFIBRILLATOR (ICD) IN SITU: ICD-10-CM

## 2017-12-19 DIAGNOSIS — I50.9 CONGESTIVE HEART FAILURE, UNSPECIFIED CONGESTIVE HEART FAILURE CHRONICITY, UNSPECIFIED CONGESTIVE HEART FAILURE TYPE: ICD-10-CM

## 2017-12-19 DIAGNOSIS — H25.9 AGE-RELATED CATARACT OF BOTH EYES, UNSPECIFIED AGE-RELATED CATARACT TYPE: ICD-10-CM

## 2017-12-19 DIAGNOSIS — I42.9 CARDIOMYOPATHY, UNSPECIFIED TYPE: ICD-10-CM

## 2017-12-19 DIAGNOSIS — I42.0 ISCHEMIC DILATED CARDIOMYOPATHY: ICD-10-CM

## 2017-12-19 DIAGNOSIS — E08.21 DIABETES MELLITUS DUE TO UNDERLYING CONDITION WITH DIABETIC NEPHROPATHY, WITHOUT LONG-TERM CURRENT USE OF INSULIN: ICD-10-CM

## 2017-12-19 DIAGNOSIS — N18.4 CHRONIC KIDNEY DISEASE, STAGE 4, SEVERELY DECREASED GFR: ICD-10-CM

## 2017-12-19 PROBLEM — H26.9 CATARACTS, BILATERAL: Status: ACTIVE | Noted: 2017-12-19

## 2017-12-19 LAB
ANION GAP SERPL CALC-SCNC: 10 MMOL/L
BNP SERPL-MCNC: 61 PG/ML
BUN SERPL-MCNC: 30 MG/DL
CALCIUM SERPL-MCNC: 9.4 MG/DL
CHLORIDE SERPL-SCNC: 101 MMOL/L
CO2 SERPL-SCNC: 26 MMOL/L
CREAT SERPL-MCNC: 2.1 MG/DL
EST. GFR  (AFRICAN AMERICAN): 40.3 ML/MIN/1.73 M^2
EST. GFR  (NON AFRICAN AMERICAN): 34.9 ML/MIN/1.73 M^2
GLUCOSE SERPL-MCNC: 129 MG/DL
POTASSIUM SERPL-SCNC: 4.2 MMOL/L
SODIUM SERPL-SCNC: 137 MMOL/L

## 2017-12-19 PROCEDURE — 99214 OFFICE O/P EST MOD 30 MIN: CPT | Mod: S$PBB,NTX,, | Performed by: INTERNAL MEDICINE

## 2017-12-19 PROCEDURE — 99999 PR PBB SHADOW E&M-EST. PATIENT-LVL III: CPT | Mod: PBBFAC,TXP,, | Performed by: INTERNAL MEDICINE

## 2017-12-19 PROCEDURE — 83880 ASSAY OF NATRIURETIC PEPTIDE: CPT | Mod: TXP

## 2017-12-19 PROCEDURE — 99213 OFFICE O/P EST LOW 20 MIN: CPT | Mod: PBBFAC,TXP | Performed by: INTERNAL MEDICINE

## 2017-12-19 PROCEDURE — 36415 COLL VENOUS BLD VENIPUNCTURE: CPT | Mod: TXP

## 2017-12-19 PROCEDURE — 80048 BASIC METABOLIC PNL TOTAL CA: CPT | Mod: TXP

## 2017-12-19 PROCEDURE — 93282 PRGRMG EVAL IMPLANTABLE DFB: CPT | Mod: PBBFAC,NTX | Performed by: INTERNAL MEDICINE

## 2017-12-19 NOTE — LETTER
December 19, 2017        Juan C West  [address] [address]  Phone: [phone]  Fax: [phone]             [facility]  [address]  [facility] [address]  Phone: [phone]   Patient: Mick Monroe   MR Number: [id number]   YOB: 1965   Date of Visit: 12/19/2017       Dear Dr. Juan C West    Thank you for referring Mick Monroe to me for evaluation. Attached you will find relevant portions of my assessment and plan of care.    If you have questions, please do not hesitate to call me. I look forward to following Mick Monroe along with you.    Sincerely,    Jai Lau MD    Enclosure    If you would like to receive this communication electronically, please contact [email or web address] or [phone] to request [employer] Link access.    [employer] Link is a tool which provides read-only access to select patient information with whom you have a relationship. Its easy to use and provides real time access to review your patients record including encounter summaries, notes, results, and demographic information.    If you feel you have received this communication in error or would no longer like to receive these types of communications, please e-mail [email or web address]

## 2017-12-19 NOTE — PROGRESS NOTES
Subjective:   Initial evaluation of heart transplant candidacy.    HPI:  Mr. Monroe is a 52 y.o. year old  male who has presented to be considered for advanced surgical options (LVAD/OHT).     He comes from [place]. History of CAD with severe three vessel disease (not a surgical candidate??)  Last 2D echo in 6/2017 with EF 25%. He also has a history of essential hypertension and DM type II not on insulin, with CKD stage III (creatinine 2.0). He underwent coronary stents placement. Doing well at this time denies any heart failure symptoms. Reports adherence with his HF medications.     KONG FC II-III NYHA . Underwent placement of ICD 8/2017 and since then has been on disability.     Complains of left hand swelling and decreased ROM with the same occurring in his left upper ext that started before the ICD placement. Denies any rashes, oral ulcers, hematuria, fatigue or weight loss.          Past Medical History:   Diagnosis Date    Arthritis     CHF (congestive heart failure)     Chronic renal disease, stage IV     Coronary atherosclerosis of unspecified type of vessel, native or graft     Diabetes mellitus     Hypertension      Past Surgical History:   Procedure Laterality Date    APPENDECTOMY         Family History   Problem Relation Age of Onset    Cancer Mother     Diabetes Father        Review of Systems   Constitution: Negative for chills, decreased appetite, diaphoresis, fever, weakness, malaise/fatigue, night sweats, weight gain and weight loss.   HENT: Negative for hearing loss and sore throat.    Eyes: Positive for vision loss in left eye and vision loss in right eye. Negative for photophobia.   Cardiovascular: Negative for chest pain, claudication, cyanosis, dyspnea on exertion, irregular heartbeat, leg swelling, near-syncope, orthopnea and palpitations.   Respiratory: Negative for cough, hemoptysis, shortness of breath, sleep disturbances due to breathing, snoring, sputum  "production and wheezing.    Skin: Positive for color change. Negative for dry skin and rash.   Musculoskeletal: Positive for joint swelling.   Gastrointestinal: Negative for abdominal pain and diarrhea.   Genitourinary: Negative for hematuria.   Neurological: Negative for headaches and light-headedness.   Psychiatric/Behavioral: Negative for depression.       Objective:   Blood pressure 127/73, pulse 65, height 5' 5" (1.651 m), weight 84.4 kg (186 lb 1.1 oz).body mass index is 30.96 kg/m².    Physical Exam   Constitutional: He is oriented to person, place, and time. He appears well-developed and well-nourished.   HENT:   Head: Normocephalic and atraumatic.   Eyes: Conjunctivae and EOM are normal. Pupils are equal, round, and reactive to light.   Neck: Normal range of motion. Neck supple. No JVD present.   Cardiovascular: Normal rate and regular rhythm.  Exam reveals no gallop and no friction rub.    No murmur heard.  Pulmonary/Chest: Breath sounds normal. No respiratory distress. He has no wheezes. He has no rales. He exhibits no tenderness.   Abdominal: Soft. Bowel sounds are normal. He exhibits no distension and no mass. There is no hepatosplenomegaly, splenomegaly or hepatomegaly. There is no tenderness. There is no rebound, no guarding and no CVA tenderness.   No hepatoslenomegaly   Musculoskeletal: Normal range of motion. He exhibits no edema or tenderness.   Neurological: He is alert and oriented to person, place, and time. He has normal reflexes. No cranial nerve deficit. He exhibits normal muscle tone. Coordination normal.   Skin: Skin is warm and dry.   Psychiatric: He has a normal mood and affect.       Labs:      Chemistry        Component Value Date/Time     09/05/2017 0923    K 4.0 09/05/2017 0923     09/05/2017 0923    CO2 29 09/05/2017 0923    BUN 31 (H) 09/05/2017 0923    CREATININE 1.9 (H) 09/05/2017 0923     09/05/2017 0923        Component Value Date/Time    CALCIUM 9.5 " 09/05/2017 0923    ALKPHOS 147 (H) 01/11/2017 1147    AST 42 (H) 01/11/2017 1147    ALT 71 (H) 01/11/2017 1147    BILITOT 0.8 01/11/2017 1147          No results found for: MG  Lab Results   Component Value Date    WBC 5.95 08/31/2017    HGB 11.2 (L) 08/31/2017    HCT 32.5 (L) 08/31/2017    MCV 82 08/31/2017     08/31/2017     BNP   Date Value Ref Range Status   12/19/2017 61 0 - 99 pg/mL Final     Comment:     Values of less than 100 pg/ml are consistent with non-CHF populations.   09/05/2017 245 (H) 0 - 99 pg/mL Final     Comment:     Values of less than 100 pg/ml are consistent with non-CHF populations.   05/09/2017 451 (H) 0 - 99 pg/mL Final     Comment:     Values of less than 100 pg/ml are consistent with non-CHF populations.     No results found for this or any previous visit.    Labs were reviewed with the patient.    Assessment:      1. Chronic systolic heart failure    2. Cardiomyopathy, unspecified type    3. Coronary artery disease involving native coronary artery of native heart without angina pectoris    4. Essential hypertension    5. Ischemic dilated cardiomyopathy    6. Chronic kidney disease, stage 4, severely decreased GFR    7. Diabetes mellitus due to underlying condition with diabetic nephropathy, without long-term current use of insulin    8. Age-related cataract of both eyes, unspecified age-related cataract type    9. Swelling of left hand        Plan:   S/p ICD placement in 8/2017. Continue on HF medications. Continue for now.     BNP 61 <-- 245.     RTC 3 months    Patient is now NYHA I ACC stage C  Recommend 2 gram sodium restriction and 1500cc fluid restriction.  Encourage physical activity with graded exercise program.  Requested patient to weigh themselves daily, and to notify us if their weight increases by more than 3 lbs in 1 day or 5 lbs in 1 week.          Transplant Candidacy: Patient is a 52 y.o. year old male with heart failure is being seen for possible LVAD and OHT. In  my opinion, he is  a suitable LVAD and OHT candidate. Patient did not meet with MCS and/or pre-transplant coordinator at the end of this visit for workup.     UNOS Patient Status  Functional Status: 90% - Able to carry on normal activity: minor symptoms of disease  Physical Capacity: Limited Mobility  Working for Income: No  If no, reason not working: Disability      Case and plan d/w Dr. Lau.     WENDY Ross  PGY-3  Pager: [phone]

## 2017-12-21 ENCOUNTER — TELEPHONE (OUTPATIENT)
Dept: TRANSPLANT | Facility: CLINIC | Age: 52
End: 2017-12-21

## 2017-12-21 NOTE — TELEPHONE ENCOUNTER
"Contacted pt regarding elevated creatinine = 2.1 (from 1.9). Pt stated he seeing his "kidney doctor" in [facility] on 12/27/17 for fu. Spoke to Clint, medical records, Dr Lacy, will fax progress note after pt is seen.   "

## 2018-01-10 ENCOUNTER — TELEPHONE (OUTPATIENT)
Dept: TRANSPLANT | Facility: CLINIC | Age: 53
End: 2018-01-10

## 2018-01-10 NOTE — TELEPHONE ENCOUNTER
Contacted Clint, medical records, Dr Lacy, [phone], requested progress note from appt on 12/27/17 be faxed for review. Will await fax.

## 2018-01-11 ENCOUNTER — DOCUMENTATION ONLY (OUTPATIENT)
Dept: TRANSPLANT | Facility: CLINIC | Age: 53
End: 2018-01-11

## 2018-01-11 NOTE — PROGRESS NOTES
Received faxed Nephrology notes from [facility], Deanna Lacy MD. States in notes CKD Stage 3 condition improving. Notes sent to medical records to be scanned into media.

## 2018-01-16 ENCOUNTER — TELEPHONE (OUTPATIENT)
Dept: ELECTROPHYSIOLOGY | Facility: CLINIC | Age: 53
End: 2018-01-16

## 2018-01-16 RX ORDER — ISOSORBIDE MONONITRATE 30 MG/1
30 TABLET, EXTENDED RELEASE ORAL DAILY
Qty: 90 TABLET | Refills: 3 | Status: SHIPPED | OUTPATIENT
Start: 2018-01-16 | End: 2019-01-26 | Stop reason: SDUPTHER

## 2018-01-16 RX ORDER — HYDRALAZINE HYDROCHLORIDE 10 MG/1
10 TABLET, FILM COATED ORAL 3 TIMES DAILY
Qty: 270 TABLET | Refills: 3 | Status: SHIPPED | OUTPATIENT
Start: 2018-01-16 | End: 2019-06-05

## 2018-01-16 RX ORDER — ATORVASTATIN CALCIUM 40 MG/1
40 TABLET, FILM COATED ORAL NIGHTLY
Qty: 90 TABLET | Refills: 3 | Status: SHIPPED | OUTPATIENT
Start: 2018-01-16 | End: 2019-01-16 | Stop reason: SDUPTHER

## 2018-01-16 RX ORDER — METOPROLOL TARTRATE 25 MG/1
25 TABLET, FILM COATED ORAL 2 TIMES DAILY
Qty: 180 TABLET | Refills: 3 | Status: SHIPPED | OUTPATIENT
Start: 2018-01-16 | End: 2020-04-08

## 2018-01-16 RX ORDER — SPIRONOLACTONE 25 MG/1
25 TABLET ORAL DAILY
Qty: 90 TABLET | Refills: 3 | Status: SHIPPED | OUTPATIENT
Start: 2018-01-16 | End: 2020-04-08

## 2018-01-16 NOTE — TELEPHONE ENCOUNTER
Called pt to confirm appts.  Pt has transportation scheduled to pick him up at 8:15.  He said that the roads are closing and he will call us johanna cantor to let us know if he will be able to come.

## 2018-01-16 NOTE — TELEPHONE ENCOUNTER
----- Message from Gina Diego sent at 2018  2:18 PM CST -----  Contact: pt   Pt called to get refills on his atorvastatin (LIPITOR) 40 MG tablet, furosemide (LASIX) 40 MG tablet, glimepiride (AMARYL) 1 MG tablet, hydrALAZINE (APRESOLINE) 10 MG tablet, isosorbide mononitrate (IMDUR) 30 MG 24 hr tablet, metoprolol tartrate (LOPRESSOR) 25 MG tablet, sacubitril-valsartan (ENTRESTO) 24-26 mg per tablet, and spironolactone (ALDACTONE) 25 MG tablet.   All the above meds are  and he needs a 90 day refill sent to the [facility] [address] [place] [address].  The pt has an appt scheduled on 3/19/18.  The pt can be reached @ [phone] or [phone].  Thanks!!

## 2018-03-14 ENCOUNTER — OFFICE VISIT (OUTPATIENT)
Dept: ELECTROPHYSIOLOGY | Facility: CLINIC | Age: 53
End: 2018-03-14
Payer: MEDICAID

## 2018-03-14 ENCOUNTER — HOSPITAL ENCOUNTER (OUTPATIENT)
Dept: CARDIOLOGY | Facility: CLINIC | Age: 53
Discharge: HOME | End: 2018-03-14
Payer: MEDICAID

## 2018-03-14 VITALS
HEART RATE: 63 BPM | DIASTOLIC BLOOD PRESSURE: 98 MMHG | SYSTOLIC BLOOD PRESSURE: 130 MMHG | BODY MASS INDEX: 30.71 KG/M2 | HEIGHT: 65 IN | WEIGHT: 184.31 LBS

## 2018-03-14 DIAGNOSIS — I25.10 CORONARY ARTERY DISEASE INVOLVING NATIVE CORONARY ARTERY OF NATIVE HEART WITHOUT ANGINA PECTORIS: ICD-10-CM

## 2018-03-14 DIAGNOSIS — I50.22 CHRONIC SYSTOLIC HEART FAILURE: ICD-10-CM

## 2018-03-14 DIAGNOSIS — I42.0 ISCHEMIC DILATED CARDIOMYOPATHY: Primary | ICD-10-CM

## 2018-03-14 DIAGNOSIS — Z95.810 ICD (IMPLANTABLE CARDIOVERTER-DEFIBRILLATOR), SINGLE, IN SITU: ICD-10-CM

## 2018-03-14 DIAGNOSIS — I10 ESSENTIAL HYPERTENSION: ICD-10-CM

## 2018-03-14 DIAGNOSIS — E08.21 DIABETES MELLITUS DUE TO UNDERLYING CONDITION WITH DIABETIC NEPHROPATHY, WITHOUT LONG-TERM CURRENT USE OF INSULIN: ICD-10-CM

## 2018-03-14 DIAGNOSIS — I25.5 ISCHEMIC DILATED CARDIOMYOPATHY: Primary | ICD-10-CM

## 2018-03-14 DIAGNOSIS — N18.4 CHRONIC KIDNEY DISEASE, STAGE 4, SEVERELY DECREASED GFR: ICD-10-CM

## 2018-03-14 PROCEDURE — 99213 OFFICE O/P EST LOW 20 MIN: CPT | Mod: PBBFAC,TXP,25 | Performed by: NURSE PRACTITIONER

## 2018-03-14 PROCEDURE — 93005 ELECTROCARDIOGRAM TRACING: CPT | Mod: PBBFAC,NTX | Performed by: INTERNAL MEDICINE

## 2018-03-14 PROCEDURE — 93010 ELECTROCARDIOGRAM REPORT: CPT | Mod: S$PBB,NTX,, | Performed by: INTERNAL MEDICINE

## 2018-03-14 PROCEDURE — 99999 PR PBB SHADOW E&M-EST. PATIENT-LVL III: CPT | Mod: PBBFAC,TXP,, | Performed by: NURSE PRACTITIONER

## 2018-03-14 PROCEDURE — 99214 OFFICE O/P EST MOD 30 MIN: CPT | Mod: S$PBB,NTX,, | Performed by: NURSE PRACTITIONER

## 2018-03-14 NOTE — PROGRESS NOTES
Subjective:    Patient ID:  Mick Monroe is a 53 y.o. male who presents for an ICD Check.     Mick Monroe is a patient of Dr. Cline.    HPI     Mr. Monroe is a 52 y/o male with ischemic cardiomyopathy, CAD, CHF, Htn, DM, CKD IV.  Primary cardiologist is Dr. West.  Also sees Dr. Lau.  Initially presented with CHF and new-onset cardiomyopathy (12/17).    Echo (12/16) EF 10-15%>>LifeVest placed.   LHC (12/21/16) PCI to LAD and RCA, subtotal occlusion of Cx>>placed on optimal medical therapy.  Echo (04/24/17) EF 25-30%  At his initial EP visit, Mr. Monroe reported experiencing dyspnea on exertion c/w NYHA Class II CHF>>denied syncope, palpitations.  He underwent successful SC ICD placement (08/31/17) without complication.     Since the procedure, Mr. Monroe reports feeling well overall.he denies typical chest pain, SOB/KONG, dizziness, palpitations, or syncope. He states that his energy level is stable>>he remains active.     Recent cardiac studies:  Device Interrogation (12/19/17) reveals an intrinsic SR with stable lead and device function. No ventricular arrhythmias or treated episodes noted. He paces 0% in the RV. Battery voltage: 3.12 V.    I reviewed today's ECG which demonstrated NSR at 63 bpm; , QRS 94, and QTc 427.    Review of Systems   Constitution: Negative for diaphoresis and malaise/fatigue.   Eyes: Negative for double vision.   Cardiovascular: Negative for chest pain, dyspnea on exertion, irregular heartbeat, near-syncope, palpitations and syncope.   Respiratory: Negative for shortness of breath.    Skin: Negative.    Musculoskeletal: Negative.    Neurological: Negative for dizziness and light-headedness.   Psychiatric/Behavioral: Negative for altered mental status.        Objective:    Physical Exam   Constitutional: He is oriented to person, place, and time. He appears well-developed and well-nourished.   HENT:   Head: Normocephalic and atraumatic.   Eyes: Pupils are equal,  round, and reactive to light.   Cardiovascular: Normal rate and regular rhythm.    Pulmonary/Chest: Effort normal.   Musculoskeletal: Normal range of motion.   Neurological: He is alert and oriented to person, place, and time.   Vitals reviewed.        Assessment:       1. Ischemic dilated cardiomyopathy    2. Chronic systolic heart failure    3. ICD (implantable cardioverter-defibrillator), single, in situ    4. Coronary artery disease involving native coronary artery of native heart without angina pectoris    5. Essential hypertension    6. Chronic kidney disease, stage 4, severely decreased GFR    7. Diabetes mellitus due to underlying condition with diabetic nephropathy, without long-term current use of insulin         Plan:       Mr. Monroe is doing well from a device perspective with stable lead and device function without arrhythmia noted.    Continue current medication regimen and device settings.   Follow up in device clinic as scheduled.   Follow up in EP clinic in 1 year, sooner as needed.     Karlene Alberts, [place], APRN, FNP-C      (A copy of today's note was sent to Dr. Cline.)

## 2018-03-20 ENCOUNTER — CLINICAL SUPPORT (OUTPATIENT)
Dept: ELECTROPHYSIOLOGY | Facility: CLINIC | Age: 53
End: 2018-03-20
Attending: INTERNAL MEDICINE
Payer: MEDICAID

## 2018-03-20 DIAGNOSIS — I42.9 CARDIOMYOPATHY, UNSPECIFIED TYPE: ICD-10-CM

## 2018-03-20 DIAGNOSIS — Z95.810 ICD (IMPLANTABLE CARDIOVERTER-DEFIBRILLATOR) IN PLACE: ICD-10-CM

## 2018-03-20 PROCEDURE — 93296 REM INTERROG EVL PM/IDS: CPT | Mod: PBBFAC,NTX | Performed by: INTERNAL MEDICINE

## 2018-03-20 PROCEDURE — 93295 DEV INTERROG REMOTE 1/2/MLT: CPT | Mod: NTX,,, | Performed by: INTERNAL MEDICINE

## 2018-03-27 ENCOUNTER — OFFICE VISIT (OUTPATIENT)
Dept: TRANSPLANT | Facility: CLINIC | Age: 53
End: 2018-03-27
Payer: MEDICAID

## 2018-03-27 VITALS
HEIGHT: 65 IN | HEART RATE: 73 BPM | DIASTOLIC BLOOD PRESSURE: 89 MMHG | BODY MASS INDEX: 30.89 KG/M2 | WEIGHT: 185.44 LBS | SYSTOLIC BLOOD PRESSURE: 148 MMHG

## 2018-03-27 DIAGNOSIS — E08.21 DIABETES MELLITUS DUE TO UNDERLYING CONDITION WITH DIABETIC NEPHROPATHY, WITHOUT LONG-TERM CURRENT USE OF INSULIN: ICD-10-CM

## 2018-03-27 DIAGNOSIS — Z95.810 ICD (IMPLANTABLE CARDIOVERTER-DEFIBRILLATOR), SINGLE, IN SITU: ICD-10-CM

## 2018-03-27 DIAGNOSIS — I25.10 CORONARY ARTERY DISEASE INVOLVING NATIVE CORONARY ARTERY OF NATIVE HEART WITHOUT ANGINA PECTORIS: ICD-10-CM

## 2018-03-27 DIAGNOSIS — I50.22 CHRONIC SYSTOLIC HEART FAILURE: ICD-10-CM

## 2018-03-27 DIAGNOSIS — N18.4 CHRONIC KIDNEY DISEASE, STAGE 4, SEVERELY DECREASED GFR: ICD-10-CM

## 2018-03-27 DIAGNOSIS — I10 ESSENTIAL HYPERTENSION: ICD-10-CM

## 2018-03-27 DIAGNOSIS — I42.9 CARDIOMYOPATHY, UNSPECIFIED TYPE: Primary | ICD-10-CM

## 2018-03-27 PROCEDURE — 99213 OFFICE O/P EST LOW 20 MIN: CPT | Mod: PBBFAC,NTX | Performed by: INTERNAL MEDICINE

## 2018-03-27 PROCEDURE — 99215 OFFICE O/P EST HI 40 MIN: CPT | Mod: S$PBB,TXP,, | Performed by: INTERNAL MEDICINE

## 2018-03-27 PROCEDURE — 99999 PR PBB SHADOW E&M-EST. PATIENT-LVL III: CPT | Mod: PBBFAC,TXP,, | Performed by: INTERNAL MEDICINE

## 2018-03-27 NOTE — PROGRESS NOTES
Subjective:   Initial evaluation of heart transplant candidacy.    HPI:  Mr. Monroe is a 53 y.o. year old  male who has presented to be considered for advanced surgical options (LVAD/OHT).     He comes from [place]. History of CAD with severe three vessel disease,  Last 2D echo in 6/2017 with EF 25%. He also has a history of essential hypertension and DM type II not on insulin, with CKD stage III (creatinine 2.0). He underwent coronary stents placement. Doing well at this time denies any heart failure symptoms. Reports adherence with his HF medications.     KONG FC II NYHA . Underwent placement of ICD 8/2017 and since then has been on disability.     No orthopnea or PND, he is complaint with low salt diet. Medication complaint as well.     Past Medical History:   Diagnosis Date    Arthritis     CHF (congestive heart failure)     Chronic renal disease, stage IV     Coronary atherosclerosis of unspecified type of vessel, native or graft     Diabetes mellitus     Hypertension      Past Surgical History:   Procedure Laterality Date    APPENDECTOMY         Family History   Problem Relation Age of Onset    Cancer Mother     Diabetes Father        Review of Systems   Constitution: Negative for chills, decreased appetite, diaphoresis, fever, weakness, malaise/fatigue, night sweats, weight gain and weight loss.   HENT: Negative for hearing loss and sore throat.    Eyes: Negative for photophobia, vision loss in left eye and vision loss in right eye.   Cardiovascular: Negative for chest pain, claudication, cyanosis, dyspnea on exertion, irregular heartbeat, leg swelling, near-syncope, orthopnea and palpitations.   Respiratory: Negative for cough, hemoptysis, shortness of breath, sleep disturbances due to breathing, snoring, sputum production and wheezing.    Skin: Negative for color change, dry skin and rash.   Gastrointestinal: Negative for abdominal pain and diarrhea.   Genitourinary: Negative for  hematuria.   Neurological: Negative for headaches and light-headedness.   Psychiatric/Behavioral: Negative for depression.       Objective:  Vitals:    03/27/18 1032   BP: (!) 148/89   Pulse: 73        Physical Exam   Constitutional: He is oriented to person, place, and time. He appears well-developed and well-nourished.   HENT:   Head: Normocephalic and atraumatic.   Eyes: Conjunctivae and EOM are normal. Pupils are equal, round, and reactive to light.   Neck: Normal range of motion. Neck supple. No JVD present.   Cardiovascular: Normal rate and regular rhythm.  Exam reveals no gallop and no friction rub.    No murmur heard.  Pulmonary/Chest: Breath sounds normal. No respiratory distress. He has no wheezes. He has no rales. He exhibits no tenderness.   Abdominal: Soft. Bowel sounds are normal. He exhibits no distension and no mass. There is no hepatosplenomegaly, splenomegaly or hepatomegaly. There is no tenderness. There is no rebound, no guarding and no CVA tenderness.   No hepatoslenomegaly   Musculoskeletal: Normal range of motion. He exhibits no edema or tenderness.   Neurological: He is alert and oriented to person, place, and time. He has normal reflexes. No cranial nerve deficit. He exhibits normal muscle tone. Coordination normal.   Skin: Skin is warm and dry.   Psychiatric: He has a normal mood and affect.       Labs:      Chemistry        Component Value Date/Time     12/19/2017 1026    K 4.2 12/19/2017 1026     12/19/2017 1026    CO2 26 12/19/2017 1026    BUN 30 (H) 12/19/2017 1026    CREATININE 2.1 (H) 12/19/2017 1026     (H) 12/19/2017 1026        Component Value Date/Time    CALCIUM 9.4 12/19/2017 1026    ALKPHOS 147 (H) 01/11/2017 1147    AST 42 (H) 01/11/2017 1147    ALT 71 (H) 01/11/2017 1147    BILITOT 0.8 01/11/2017 1147          No results found for: MG  Lab Results   Component Value Date    WBC 5.95 08/31/2017    HGB 11.2 (L) 08/31/2017    HCT 32.5 (L) 08/31/2017    MCV 82  08/31/2017     08/31/2017     BNP   Date Value Ref Range Status   12/19/2017 61 0 - 99 pg/mL Final     Comment:     Values of less than 100 pg/ml are consistent with non-CHF populations.   09/05/2017 245 (H) 0 - 99 pg/mL Final     Comment:     Values of less than 100 pg/ml are consistent with non-CHF populations.   05/09/2017 451 (H) 0 - 99 pg/mL Final     Comment:     Values of less than 100 pg/ml are consistent with non-CHF populations.     No results found for this or any previous visit.    Labs were reviewed with the patient.    Assessment:      1. Cardiomyopathy, unspecified type    2. Chronic systolic heart failure    3. Coronary artery disease involving native coronary artery of native heart without angina pectoris    4. Essential hypertension    5. ICD (implantable cardioverter-defibrillator), single, in situ    6. Chronic kidney disease, stage 4, severely decreased GFR    7. Diabetes mellitus due to underlying condition with diabetic nephropathy, without long-term current use of insulin        Plan:   S/p ICD placement in 8/2017. Continue on HF medications.     · Heart Failure Stage ACC/AHA stage C  · Functional Class NYHA II  · Recommend 2 gram sodium restriction and 1500 cc fluid restriction.  · Encourage physical activity with graded exercise program.  · Requested patient to weigh themselves daily, and to notify us if their weight increases by more than 3 lbs in 1 day or 5 lbs in 1 week.   · RTC 4 months  · Repeat 2 D echo upon next visit.     Transplant Candidacy: Patient is a 53 y.o. year old male with heart failure is being seen for possible LVAD and OHT. In my opinion, he is  a suitable LVAD and OHT candidate..     UNOS Patient Status  Functional Status: 90% - Able to carry on normal activity: minor symptoms of disease  Physical Capacity: Limited Mobility  Working for Income: No  If no, reason not working: Disability    The patient Mick Monroe was seen, assessed, evaluated and examined  with the presence of the attending provider Dr. Lau.  RTC in 4 months.     Radha Rosario MD  Internal Medicine Resident (PGY-II)  Pager: [phone]      Agree with plan

## 2018-03-27 NOTE — LETTER
March 27, 2018        Juan C West  [address] [address]  Phone: [phone]  Fax: [phone]             [facility]  [address]  [facility] [address]  Phone: [phone]   Patient: Mick Monroe   MR Number: [id number]   YOB: 1965   Date of Visit: 3/27/2018       Dear Dr. Juan C West    Thank you for referring Mick Monroe to me for evaluation. Attached you will find relevant portions of my assessment and plan of care.    If you have questions, please do not hesitate to call me. I look forward to following Mick Monroe along with you.    Sincerely,    Jai Lau MD    Enclosure    If you would like to receive this communication electronically, please contact [email or web address] or [phone] to request [employer] access.    [employer] is a tool which provides read-only access to select patient information with whom you have a relationship. Its easy to use and provides real time access to review your patients record including encounter summaries, notes, results, and demographic information.    If you feel you have received this communication in error or would no longer like to receive these types of communications, please e-mail [email or web address]

## 2018-04-20 ENCOUNTER — TELEPHONE (OUTPATIENT)
Dept: ELECTROPHYSIOLOGY | Facility: CLINIC | Age: 53
End: 2018-04-20

## 2018-04-20 NOTE — TELEPHONE ENCOUNTER
----- Message from Lauren Camarillo MA sent at 4/20/2018 12:15 PM CDT -----  Contact: patient called   Please call the patient at [phone] the patient is in [facility] he need to talk to someone about his device. Thank you.       Patient needed the name and type of device that he has as he is currently in the hospital. I gave him this information.

## 2018-06-19 ENCOUNTER — CLINICAL SUPPORT (OUTPATIENT)
Dept: ELECTROPHYSIOLOGY | Facility: CLINIC | Age: 53
End: 2018-06-19
Attending: INTERNAL MEDICINE
Payer: MEDICAID

## 2018-06-19 DIAGNOSIS — Z95.810 ICD (IMPLANTABLE CARDIOVERTER-DEFIBRILLATOR) IN PLACE: ICD-10-CM

## 2018-06-19 DIAGNOSIS — I42.9 CARDIOMYOPATHY, UNSPECIFIED TYPE: ICD-10-CM

## 2018-06-19 PROCEDURE — 93295 DEV INTERROG REMOTE 1/2/MLT: CPT | Mod: NTX,,, | Performed by: INTERNAL MEDICINE

## 2018-06-19 PROCEDURE — 93296 REM INTERROG EVL PM/IDS: CPT | Mod: PBBFAC,NTX | Performed by: INTERNAL MEDICINE

## 2018-06-26 ENCOUNTER — OFFICE VISIT (OUTPATIENT)
Dept: TRANSPLANT | Facility: CLINIC | Age: 53
End: 2018-06-26
Payer: MEDICAID

## 2018-06-26 ENCOUNTER — DOCUMENTATION ONLY (OUTPATIENT)
Dept: TRANSPLANT | Facility: CLINIC | Age: 53
End: 2018-06-26

## 2018-06-26 ENCOUNTER — HOSPITAL ENCOUNTER (OUTPATIENT)
Dept: CARDIOLOGY | Facility: CLINIC | Age: 53
Discharge: HOME | End: 2018-06-26
Attending: STUDENT IN AN ORGANIZED HEALTH CARE EDUCATION/TRAINING PROGRAM
Payer: MEDICAID

## 2018-06-26 VITALS
DIASTOLIC BLOOD PRESSURE: 79 MMHG | BODY MASS INDEX: 29.27 KG/M2 | WEIGHT: 175.69 LBS | HEIGHT: 65 IN | HEART RATE: 57 BPM | SYSTOLIC BLOOD PRESSURE: 110 MMHG

## 2018-06-26 DIAGNOSIS — I42.9 CARDIOMYOPATHY, UNSPECIFIED TYPE: ICD-10-CM

## 2018-06-26 DIAGNOSIS — I25.10 CORONARY ARTERY DISEASE INVOLVING NATIVE CORONARY ARTERY OF NATIVE HEART WITHOUT ANGINA PECTORIS: ICD-10-CM

## 2018-06-26 DIAGNOSIS — Z95.810 ICD (IMPLANTABLE CARDIOVERTER-DEFIBRILLATOR), SINGLE, IN SITU: ICD-10-CM

## 2018-06-26 DIAGNOSIS — N18.4 CHRONIC KIDNEY DISEASE, STAGE 4, SEVERELY DECREASED GFR: Primary | ICD-10-CM

## 2018-06-26 DIAGNOSIS — I50.22 CHRONIC SYSTOLIC HEART FAILURE: ICD-10-CM

## 2018-06-26 DIAGNOSIS — H25.9 AGE-RELATED CATARACT OF BOTH EYES, UNSPECIFIED AGE-RELATED CATARACT TYPE: ICD-10-CM

## 2018-06-26 DIAGNOSIS — I42.0 ISCHEMIC DILATED CARDIOMYOPATHY: ICD-10-CM

## 2018-06-26 DIAGNOSIS — E08.21 DIABETES MELLITUS DUE TO UNDERLYING CONDITION WITH DIABETIC NEPHROPATHY, WITHOUT LONG-TERM CURRENT USE OF INSULIN: ICD-10-CM

## 2018-06-26 DIAGNOSIS — I25.5 ISCHEMIC DILATED CARDIOMYOPATHY: ICD-10-CM

## 2018-06-26 LAB
AORTIC VALVE REGURGITATION: ABNORMAL
DIASTOLIC DYSFUNCTION: YES
ESTIMATED PA SYSTOLIC PRESSURE: 24.72
RETIRED EF AND QEF - SEE NOTES: 40 (ref 55–65)
TRICUSPID VALVE REGURGITATION: ABNORMAL

## 2018-06-26 PROCEDURE — 99999 PR PBB SHADOW E&M-EST. PATIENT-LVL III: CPT | Mod: PBBFAC,TXP,, | Performed by: INTERNAL MEDICINE

## 2018-06-26 PROCEDURE — 99213 OFFICE O/P EST LOW 20 MIN: CPT | Mod: PBBFAC,TXP | Performed by: INTERNAL MEDICINE

## 2018-06-26 PROCEDURE — 93306 TTE W/DOPPLER COMPLETE: CPT | Mod: PBBFAC,NTX | Performed by: INTERNAL MEDICINE

## 2018-06-26 PROCEDURE — 99215 OFFICE O/P EST HI 40 MIN: CPT | Mod: S$PBB,TXP,, | Performed by: INTERNAL MEDICINE

## 2018-06-26 NOTE — PROGRESS NOTES
Subjective:   Initial evaluation of heart transplant candidacy.    HPI:  Mr. Monroe is a 53 y.o. year old  male who has presented to be considered for advanced surgical options (LVAD/OHT).     He comes from [place]. History of CAD with severe three vessel disease,  Last 2D echo in 6/2017 with EF 25%. He also has a history of essential hypertension and DM type II not on insulin, with CKD stage III (creatinine 2.0).Doing well at this time denies any heart failure symptoms. Reports adherence with his HF medications.     KONG FC II NYHA . Underwent placement of ICD 8/2017 and since then has been on disability.     No orthopnea or PND, he is complaint with low salt diet. Medication complaint as well.     Needs catarct surgery    CONCLUSIONS     1 - Upper limit of normal left ventricular enlargement.     2 - Moderately depressed left ventricular systolic function (EF upper 30s to low 40s).     3 - Impaired LV relaxation, normal LAP (grade 1 diastolic dysfunction).     4 - Right ventricular enlargement with mildly depressed systolic function.     5 - Trivial aortic regurgitation.     6 - Mild tricuspid regurgitation.     7 - Trivial pulmonic regurgitation.     8 - The estimated PA systolic pressure is 25 mmHg.     Past Medical History:   Diagnosis Date    Arthritis     CHF (congestive heart failure)     Chronic renal disease, stage IV     Coronary atherosclerosis of unspecified type of vessel, native or graft     Diabetes mellitus     Hypertension      Past Surgical History:   Procedure Laterality Date    APPENDECTOMY         Family History   Problem Relation Age of Onset    Cancer Mother     Diabetes Father        Review of Systems   Constitution: Negative for chills, decreased appetite, diaphoresis, fever, weakness, malaise/fatigue, night sweats, weight gain and weight loss.   HENT: Negative for hearing loss and sore throat.    Eyes: Negative for photophobia, vision loss in left eye and vision  loss in right eye.   Cardiovascular: Negative for chest pain, claudication, cyanosis, dyspnea on exertion, irregular heartbeat, leg swelling, near-syncope, orthopnea and palpitations.   Respiratory: Negative for cough, hemoptysis, shortness of breath, sleep disturbances due to breathing, snoring, sputum production and wheezing.    Skin: Negative for color change, dry skin and rash.   Gastrointestinal: Negative for abdominal pain and diarrhea.   Genitourinary: Negative for hematuria.   Neurological: Negative for headaches and light-headedness.   Psychiatric/Behavioral: Negative for depression.       Objective:  Vitals:    06/26/18 0854   BP: 110/79   Pulse: (!) 57        Physical Exam   Constitutional: He is oriented to person, place, and time. He appears well-developed and well-nourished.   HENT:   Head: Normocephalic and atraumatic.   Eyes: Conjunctivae and EOM are normal. Pupils are equal, round, and reactive to light.   Neck: Normal range of motion. Neck supple. No JVD present.   Cardiovascular: Normal rate and regular rhythm.  Exam reveals no gallop and no friction rub.    No murmur heard.  Pulmonary/Chest: Breath sounds normal. No respiratory distress. He has no wheezes. He has no rales. He exhibits no tenderness.   Abdominal: Soft. Bowel sounds are normal. He exhibits no distension and no mass. There is no hepatosplenomegaly, splenomegaly or hepatomegaly. There is no tenderness. There is no rebound, no guarding and no CVA tenderness.   No hepatoslenomegaly   Musculoskeletal: Normal range of motion. He exhibits no edema or tenderness.   Neurological: He is alert and oriented to person, place, and time. He has normal reflexes. No cranial nerve deficit. He exhibits normal muscle tone. Coordination normal.   Skin: Skin is warm and dry.   Psychiatric: He has a normal mood and affect.       Labs:      Chemistry        Component Value Date/Time     06/26/2018 0715    K 4.9 06/26/2018 0715     06/26/2018  0715    CO2 21 (L) 06/26/2018 0715    BUN 39 (H) 06/26/2018 0715    CREATININE 2.3 (H) 06/26/2018 0715    GLU 87 06/26/2018 0715        Component Value Date/Time    CALCIUM 9.9 06/26/2018 0715    ALKPHOS 147 (H) 01/11/2017 1147    AST 42 (H) 01/11/2017 1147    ALT 71 (H) 01/11/2017 1147    BILITOT 0.8 01/11/2017 1147          No results found for: MG  Lab Results   Component Value Date    WBC 5.95 08/31/2017    HGB 11.2 (L) 08/31/2017    HCT 32.5 (L) 08/31/2017    MCV 82 08/31/2017     08/31/2017     BNP   Date Value Ref Range Status   06/26/2018 48 0 - 99 pg/mL Final     Comment:     Values of less than 100 pg/ml are consistent with non-CHF populations.   12/19/2017 61 0 - 99 pg/mL Final     Comment:     Values of less than 100 pg/ml are consistent with non-CHF populations.   09/05/2017 245 (H) 0 - 99 pg/mL Final     Comment:     Values of less than 100 pg/ml are consistent with non-CHF populations.     No results found for this or any previous visit.    Labs were reviewed with the patient.    Assessment:      1. Chronic kidney disease, stage 4, severely decreased GFR    2. Diabetes mellitus due to underlying condition with diabetic nephropathy, without long-term current use of insulin    3. ICD (implantable cardioverter-defibrillator), single, in situ    4. Ischemic dilated cardiomyopathy    5. Coronary artery disease involving native coronary artery of native heart without angina pectoris    6. Chronic systolic heart failure    7. Cardiomyopathy, unspecified type    8. Age-related cataract of both eyes, unspecified age-related cataract type        Plan:   S/p ICD placement in 8/2017. Continue on HF medications. Stable    Low risk for cataract surgery. He can stop the aspirin five days before    He also can have his teeth work on    · Heart Failure Stage ACC/AHA stage C  · Functional Class NYHA II  · Recommend 2 gram sodium restriction and 1500 cc fluid restriction.  · Encourage physical activity with  graded exercise program.  · Requested patient to weigh themselves daily, and to notify us if their weight increases by more than 3 lbs in 1 day or 5 lbs in 1 week.   · RTC 4 months  · Repeat 2 D echo upon next visit.     Transplant Candidacy: Patient is a 53 y.o. year old male with heart failure is being seen for possible LVAD and OHT. In my opinion, he is  a suitable LVAD and OHT candidate..     UNOS Patient Status  Functional Status: 90% - Able to carry on normal activity: minor symptoms of disease  Physical Capacity: Limited Mobility  Working for Income: No  If no, reason not working: Disability     RTC 4 months

## 2018-06-26 NOTE — PROGRESS NOTES
Dr Lau notified of bump in creatinine = 2.3 (from 2.1 12/19/17), pt following with local nephrologist. No orders received.

## 2018-06-26 NOTE — LETTER
June 26, 2018        Juan [facility] West  [address] [address]  Phone: [phone]  Fax: [phone]             [facility]  [address]  [facility] [address]  Phone: [phone]   Patient: Mick Monroe   MR Number: [id number]   YOB: 1965   Date of Visit: 6/26/2018       Dear Dr. Juan C West    Thank you for referring Mick Monroe to me for evaluation. Attached you will find relevant portions of my assessment and plan of care.    If you have questions, please do not hesitate to call me. I look forward to following Mick Monroe along with you.    Sincerely,    Jai Lau MD    Enclosure    If you would like to receive this communication electronically, please contact [email or web address] or [phone] to request [employer] access.    Qu[employer]by Link is a tool which provides read-only access to select patient information with whom you have a relationship. Its easy to use and provides real time access to review your patients record including encounter summaries, notes, results, and demographic information.    If you feel you have received this communication in error or would no longer like to receive these types of communications, please e-mail [email or web address]

## 2018-07-06 ENCOUNTER — TELEPHONE (OUTPATIENT)
Dept: ELECTROPHYSIOLOGY | Facility: CLINIC | Age: 53
End: 2018-07-06

## 2018-07-06 ENCOUNTER — TELEPHONE (OUTPATIENT)
Dept: TRANSPLANT | Facility: CLINIC | Age: 53
End: 2018-07-06

## 2018-07-06 NOTE — TELEPHONE ENCOUNTER
----- Message from Joann Ochoa sent at 7/6/2018  1:12 PM CDT -----  Contact: pt  Pt called needing a copy of his cardiac clearance that was given to him by Dr. Lau. He would like it faxed to Dr. Corado [phone] his eye doctor. Please call him at the number listed.    Thanks

## 2018-07-06 NOTE — TELEPHONE ENCOUNTER
----- Message from Demetrius Winkler MA sent at 7/6/2018 12:43 PM CDT -----  Contact: Patient      ----- Message -----  From: Park Domingo  Sent: 7/6/2018  11:45 AM  To: Marlyn Hernandez Staff    The Pt is calling to get a clearance for eye surgery. Please call him back @ [phone]. Thanks, Park

## 2018-07-06 NOTE — TELEPHONE ENCOUNTER
Left message for patient advising that Dr Lau has already sent the cardiac clearance and he is not on OAC that would need to be held. He does not need clearance from Dr Cline. Call back # left.

## 2018-07-06 NOTE — TELEPHONE ENCOUNTER
Cardiac clearance for Cataract surgery faxed to Dr Corado, [phone]. Contact number [phone]. Per Dr Lau's note: Low risk for cataract surgery. He can stop the aspirin five days before. Transmission confirmation received.     Pt notified of the above, voiced understanding.

## 2018-09-04 DIAGNOSIS — I50.9 CONGESTIVE HEART FAILURE, UNSPECIFIED HF CHRONICITY, UNSPECIFIED HEART FAILURE TYPE: Primary | ICD-10-CM

## 2018-09-24 ENCOUNTER — CLINICAL SUPPORT (OUTPATIENT)
Dept: ELECTROPHYSIOLOGY | Facility: CLINIC | Age: 53
End: 2018-09-24
Attending: INTERNAL MEDICINE
Payer: MEDICAID

## 2018-09-24 DIAGNOSIS — Z95.810 ICD (IMPLANTABLE CARDIOVERTER-DEFIBRILLATOR) IN PLACE: ICD-10-CM

## 2018-09-24 DIAGNOSIS — I42.9 CARDIOMYOPATHY, UNSPECIFIED TYPE: ICD-10-CM

## 2018-09-24 PROCEDURE — 93296 REM INTERROG EVL PM/IDS: CPT | Mod: PBBFAC,NTX | Performed by: INTERNAL MEDICINE

## 2018-09-24 PROCEDURE — 93295 DEV INTERROG REMOTE 1/2/MLT: CPT | Mod: NTX,,, | Performed by: INTERNAL MEDICINE

## 2018-09-25 DIAGNOSIS — I42.0 ISCHEMIC DILATED CARDIOMYOPATHY: ICD-10-CM

## 2018-09-25 DIAGNOSIS — I25.5 ISCHEMIC DILATED CARDIOMYOPATHY: ICD-10-CM

## 2018-09-25 DIAGNOSIS — I50.9 CHF (CONGESTIVE HEART FAILURE): ICD-10-CM

## 2018-09-25 DIAGNOSIS — Z95.810 AICD (AUTOMATIC CARDIOVERTER/DEFIBRILLATOR) PRESENT: Primary | ICD-10-CM

## 2018-12-05 ENCOUNTER — LAB VISIT (OUTPATIENT)
Dept: LAB | Facility: HOSPITAL | Age: 53
End: 2018-12-05
Attending: INTERNAL MEDICINE
Payer: MEDICAID

## 2018-12-05 ENCOUNTER — OFFICE VISIT (OUTPATIENT)
Dept: TRANSPLANT | Facility: CLINIC | Age: 53
End: 2018-12-05
Payer: MEDICAID

## 2018-12-05 VITALS
WEIGHT: 181.44 LBS | SYSTOLIC BLOOD PRESSURE: 124 MMHG | DIASTOLIC BLOOD PRESSURE: 79 MMHG | HEIGHT: 65 IN | HEART RATE: 64 BPM | BODY MASS INDEX: 30.23 KG/M2

## 2018-12-05 DIAGNOSIS — N18.4 CHRONIC KIDNEY DISEASE, STAGE 4, SEVERELY DECREASED GFR: ICD-10-CM

## 2018-12-05 DIAGNOSIS — I42.0 DILATED CARDIOMYOPATHY: Primary | ICD-10-CM

## 2018-12-05 DIAGNOSIS — I42.0 ISCHEMIC DILATED CARDIOMYOPATHY: ICD-10-CM

## 2018-12-05 DIAGNOSIS — I25.5 ISCHEMIC DILATED CARDIOMYOPATHY: ICD-10-CM

## 2018-12-05 DIAGNOSIS — Z95.810 ICD (IMPLANTABLE CARDIOVERTER-DEFIBRILLATOR), SINGLE, IN SITU: ICD-10-CM

## 2018-12-05 DIAGNOSIS — I50.9 CONGESTIVE HEART FAILURE, UNSPECIFIED HF CHRONICITY, UNSPECIFIED HEART FAILURE TYPE: ICD-10-CM

## 2018-12-05 DIAGNOSIS — I10 ESSENTIAL HYPERTENSION: ICD-10-CM

## 2018-12-05 DIAGNOSIS — I50.22 CHRONIC SYSTOLIC HEART FAILURE: ICD-10-CM

## 2018-12-05 DIAGNOSIS — N52.8 OTHER MALE ERECTILE DYSFUNCTION: ICD-10-CM

## 2018-12-05 DIAGNOSIS — I25.10 CORONARY ARTERY DISEASE INVOLVING NATIVE CORONARY ARTERY OF NATIVE HEART WITHOUT ANGINA PECTORIS: ICD-10-CM

## 2018-12-05 LAB
ALBUMIN SERPL BCP-MCNC: 3.8 G/DL
ALP SERPL-CCNC: 104 U/L
ALT SERPL W/O P-5'-P-CCNC: 15 U/L
ANION GAP SERPL CALC-SCNC: 6 MMOL/L
AST SERPL-CCNC: 17 U/L
BILIRUB SERPL-MCNC: 0.6 MG/DL
BNP SERPL-MCNC: 18 PG/ML
BUN SERPL-MCNC: 18 MG/DL
CALCIUM SERPL-MCNC: 9.4 MG/DL
CHLORIDE SERPL-SCNC: 102 MMOL/L
CO2 SERPL-SCNC: 30 MMOL/L
CREAT SERPL-MCNC: 1.8 MG/DL
EST. GFR  (AFRICAN AMERICAN): 48.3 ML/MIN/1.73 M^2
EST. GFR  (NON AFRICAN AMERICAN): 41.7 ML/MIN/1.73 M^2
GLUCOSE SERPL-MCNC: 151 MG/DL
POTASSIUM SERPL-SCNC: 4 MMOL/L
PROT SERPL-MCNC: 8.2 G/DL
SODIUM SERPL-SCNC: 138 MMOL/L

## 2018-12-05 PROCEDURE — 99999 PR PBB SHADOW E&M-EST. PATIENT-LVL III: CPT | Mod: PBBFAC,TXP,, | Performed by: INTERNAL MEDICINE

## 2018-12-05 PROCEDURE — 80053 COMPREHEN METABOLIC PANEL: CPT | Mod: NTX

## 2018-12-05 PROCEDURE — 99215 OFFICE O/P EST HI 40 MIN: CPT | Mod: S$PBB,NTX,, | Performed by: INTERNAL MEDICINE

## 2018-12-05 PROCEDURE — 36415 COLL VENOUS BLD VENIPUNCTURE: CPT | Mod: TXP

## 2018-12-05 PROCEDURE — 99213 OFFICE O/P EST LOW 20 MIN: CPT | Mod: PBBFAC,TXP | Performed by: INTERNAL MEDICINE

## 2018-12-05 PROCEDURE — 83880 ASSAY OF NATRIURETIC PEPTIDE: CPT | Mod: TXP

## 2018-12-05 RX ORDER — BLOOD-GLUCOSE CONTROL, NORMAL
EACH MISCELLANEOUS
COMMUNITY
Start: 2018-07-09 | End: 2020-08-17 | Stop reason: SDUPTHER

## 2018-12-05 RX ORDER — BLOOD-GLUCOSE CONTROL, NORMAL
EACH MISCELLANEOUS
COMMUNITY
Start: 2018-05-25 | End: 2020-08-17 | Stop reason: SDUPTHER

## 2018-12-05 NOTE — LETTER
December 5, 2018        Juan C West  [address] [address]  Phone: [phone]  Fax: [phone]             [facility]  [address]  [facility] [address]  Phone: [phone]   Patient: Mick Monroe   MR Number: [id number]   YOB: 1965   Date of Visit: 12/5/2018       Dear Dr. Juan C West    Thank you for referring Mick Monroe to me for evaluation. Attached you will find relevant portions of my assessment and plan of care.    If you have questions, please do not hesitate to call me. I look forward to following Mick Monroe along with you.    Sincerely,    Jai Lau MD    Enclosure    If you would like to receive this communication electronically, please contact [email or web address] or [phone] to request [employer] Link access.    Lingoing Link is a tool which provides read-only access to select patient information with whom you have a relationship. Its easy to use and provides real time access to review your patients record including encounter summaries, notes, results, and demographic information.    If you feel you have received this communication in error or would no longer like to receive these types of communications, please e-mail [email or web address]

## 2018-12-05 NOTE — PROGRESS NOTES
Subjective:   Initial evaluation of heart transplant candidacy.    HPI:  Mr. Monroe is a 53 y.o. year old  male who has presented to be considered for advanced surgical options (LVAD/OHT).     He comes from [place]. History of CAD with severe three vessel disease,  Last 2D echo in 6/2017 with EF 25%. He also has a history of essential hypertension and DM type II not on insulin, with CKD stage III (creatinine 2.0).Doing well at this time denies any heart failure symptoms. Reports adherence with his HF medications.     KONG FC II NYHA . Underwent placement of ICD 8/2017 and since then has been on disability.     No orthopnea or PND, he is complaint with low salt diet. Medication complaint as well.         CONCLUSIONS     1 - Upper limit of normal left ventricular enlargement.     2 - Moderately depressed left ventricular systolic function (EF upper 30s to low 40s).     3 - Impaired LV relaxation, normal LAP (grade 1 diastolic dysfunction).     4 - Right ventricular enlargement with mildly depressed systolic function.     5 - Trivial aortic regurgitation.     6 - Mild tricuspid regurgitation.     7 - Trivial pulmonic regurgitation.     8 - The estimated PA systolic pressure is 25 mmHg.     Past Medical History:   Diagnosis Date    Arthritis     CHF (congestive heart failure)     Chronic renal disease, stage IV     Coronary atherosclerosis of unspecified type of vessel, native or graft     Diabetes mellitus     Hypertension      Past Surgical History:   Procedure Laterality Date    APPENDECTOMY      INSERTION-ICD-SINGLE N/A 8/31/2017    Performed by David Cline MD at [facility] CATH LAB       Family History   Problem Relation Age of Onset    Cancer Mother     Diabetes Father        Review of Systems   Constitution: Negative for chills, decreased appetite, diaphoresis, fever, weakness, malaise/fatigue, night sweats, weight gain and weight loss.   HENT: Negative for hearing loss and sore throat.     Eyes: Negative for photophobia, vision loss in left eye and vision loss in right eye.   Cardiovascular: Negative for chest pain, claudication, cyanosis, dyspnea on exertion, irregular heartbeat, leg swelling, near-syncope, orthopnea and palpitations.   Respiratory: Negative for cough, hemoptysis, shortness of breath, sleep disturbances due to breathing, snoring, sputum production and wheezing.    Skin: Negative for color change, dry skin and rash.   Gastrointestinal: Negative for abdominal pain and diarrhea.   Genitourinary: Negative for hematuria.   Neurological: Negative for headaches and light-headedness.   Psychiatric/Behavioral: Negative for depression.       Objective:  Vitals:    12/05/18 0934   BP: 124/79   Pulse: 64        Physical Exam   Constitutional: He is oriented to person, place, and time. He appears well-developed and well-nourished.   HENT:   Head: Normocephalic and atraumatic.   Eyes: Conjunctivae and EOM are normal. Pupils are equal, round, and reactive to light.   Neck: Normal range of motion. Neck supple. No JVD present.   Cardiovascular: Normal rate and regular rhythm. Exam reveals no gallop and no friction rub.   No murmur heard.  Pulmonary/Chest: Breath sounds normal. No respiratory distress. He has no wheezes. He has no rales. He exhibits no tenderness.   Abdominal: Soft. Bowel sounds are normal. He exhibits no distension and no mass. There is no hepatosplenomegaly, splenomegaly or hepatomegaly. There is no tenderness. There is no rebound, no guarding and no CVA tenderness.   No hepatoslenomegaly   Musculoskeletal: Normal range of motion. He exhibits no edema or tenderness.   Neurological: He is alert and oriented to person, place, and time. He has normal reflexes. No cranial nerve deficit. He exhibits normal muscle tone. Coordination normal.   Skin: Skin is warm and dry.   Psychiatric: He has a normal mood and affect.       Labs:      Chemistry        Component Value Date/Time      12/05/2018 0755    K 4.0 12/05/2018 0755     12/05/2018 0755    CO2 30 (H) 12/05/2018 0755    BUN 18 12/05/2018 0755    CREATININE 1.8 (H) 12/05/2018 0755     (H) 12/05/2018 0755        Component Value Date/Time    CALCIUM 9.4 12/05/2018 0755    ALKPHOS 104 12/05/2018 0755    AST 17 12/05/2018 0755    ALT 15 12/05/2018 0755    BILITOT 0.6 12/05/2018 0755          No results found for: MG  Lab Results   Component Value Date    WBC 5.95 08/31/2017    HGB 11.2 (L) 08/31/2017    HCT 32.5 (L) 08/31/2017    MCV 82 08/31/2017     08/31/2017     BNP   Date Value Ref Range Status   12/05/2018 18 0 - 99 pg/mL Final     Comment:     Values of less than 100 pg/ml are consistent with non-CHF populations.   06/26/2018 48 0 - 99 pg/mL Final     Comment:     Values of less than 100 pg/ml are consistent with non-CHF populations.   12/19/2017 61 0 - 99 pg/mL Final     Comment:     Values of less than 100 pg/ml are consistent with non-CHF populations.     No results found for this or any previous visit.    Labs were reviewed with the patient.    Assessment:      1. Dilated cardiomyopathy    2. Chronic systolic heart failure    3. Coronary artery disease involving native coronary artery of native heart without angina pectoris    4. Essential hypertension    5. ICD (implantable cardioverter-defibrillator), single, in situ    6. Ischemic dilated cardiomyopathy    7. Chronic kidney disease, stage 4, severely decreased GFR        Plan:   S/p ICD placement in 8/2017. Continue on HF medications. Stable    · Heart Failure Stage ACC/AHA stage C  · Functional Class NYHA II  · Recommend 2 gram sodium restriction and 1500 cc fluid restriction.  · Encourage physical activity with graded exercise program.  · Requested patient to weigh themselves daily, and to notify us if their weight increases by more than 3 lbs in 1 day or 5 lbs in 1 week.       Transplant Candidacy: Patient is a 53 y.o. year old male with heart failure is  being seen for possible LVAD and OHT. In my opinion, he is  a suitable LVAD and OHT candidate..     UNOS Patient Status  Functional Status: 90% - Able to carry on normal activity: minor symptoms of disease  Physical Capacity: Limited Mobility  Working for Income: No  If no, reason not working: Disability     RTC 6 months

## 2018-12-06 ENCOUNTER — TELEPHONE (OUTPATIENT)
Dept: UROLOGY | Facility: CLINIC | Age: 53
End: 2018-12-06

## 2018-12-06 NOTE — TELEPHONE ENCOUNTER
----- Message from Shirley Jara RN sent at 12/6/2018 11:35 AM CST -----  Please schedule with Jasmin. Patient is a transplant patient here at [facility]. thanks  ----- Message -----  From: Lauren Rae LPN  Sent: 12/5/2018   5:15 PM  To: Shirley Jara RN    He is medicaid.  ----- Message -----  From: Shirley Jara RN  Sent: 12/5/2018   4:55 PM  To: Lauren Rae LPN        ----- Message -----  From: Samreen Hess  Sent: 12/5/2018  10:04 AM  To: [facility] Urology Clinical Staff    Good afternoon,             Pt is being referred to Urology for male erectile dysfunction. Please contact him at  [phone].                                                                   Thank you

## 2018-12-07 NOTE — TELEPHONE ENCOUNTER
Tried to reach patient on all phone numbers listed in contact info, couldn't leave a message for patient to call back.

## 2019-01-03 DIAGNOSIS — Z95.810 AICD (AUTOMATIC CARDIOVERTER/DEFIBRILLATOR) PRESENT: Primary | ICD-10-CM

## 2019-01-03 DIAGNOSIS — I25.5 ISCHEMIC DILATED CARDIOMYOPATHY: ICD-10-CM

## 2019-01-03 DIAGNOSIS — I42.0 ISCHEMIC DILATED CARDIOMYOPATHY: ICD-10-CM

## 2019-01-07 ENCOUNTER — CLINICAL SUPPORT (OUTPATIENT)
Dept: CARDIOLOGY | Facility: HOSPITAL | Age: 54
End: 2019-01-07
Attending: INTERNAL MEDICINE
Payer: MEDICAID

## 2019-01-07 DIAGNOSIS — Z95.810 AICD (AUTOMATIC CARDIOVERTER/DEFIBRILLATOR) PRESENT: ICD-10-CM

## 2019-01-07 DIAGNOSIS — I25.5 ISCHEMIC DILATED CARDIOMYOPATHY: ICD-10-CM

## 2019-01-07 DIAGNOSIS — I42.0 ISCHEMIC DILATED CARDIOMYOPATHY: ICD-10-CM

## 2019-01-07 PROCEDURE — 93296 REM INTERROG EVL PM/IDS: CPT | Mod: TXP

## 2019-01-10 ENCOUNTER — TELEPHONE (OUTPATIENT)
Dept: UROLOGY | Facility: CLINIC | Age: 54
End: 2019-01-10

## 2019-01-10 ENCOUNTER — OFFICE VISIT (OUTPATIENT)
Dept: UROLOGY | Facility: CLINIC | Age: 54
End: 2019-01-10
Payer: MEDICAID

## 2019-01-10 VITALS
DIASTOLIC BLOOD PRESSURE: 77 MMHG | HEIGHT: 65 IN | WEIGHT: 186.75 LBS | SYSTOLIC BLOOD PRESSURE: 121 MMHG | BODY MASS INDEX: 31.11 KG/M2 | HEART RATE: 71 BPM

## 2019-01-10 DIAGNOSIS — C61 PROSTATE CANCER: ICD-10-CM

## 2019-01-10 DIAGNOSIS — N52.9 ERECTILE DYSFUNCTION, UNSPECIFIED ERECTILE DYSFUNCTION TYPE: Primary | ICD-10-CM

## 2019-01-10 PROCEDURE — 99203 OFFICE O/P NEW LOW 30 MIN: CPT | Mod: S$PBB,NTX,, | Performed by: NURSE PRACTITIONER

## 2019-01-10 PROCEDURE — 99999 PR PBB SHADOW E&M-EST. PATIENT-LVL III: CPT | Mod: PBBFAC,TXP,, | Performed by: NURSE PRACTITIONER

## 2019-01-10 PROCEDURE — 99203 PR OFFICE/OUTPT VISIT, NEW, LEVL III, 30-44 MIN: ICD-10-PCS | Mod: S$PBB,NTX,, | Performed by: NURSE PRACTITIONER

## 2019-01-10 PROCEDURE — 99999 PR PBB SHADOW E&M-EST. PATIENT-LVL III: ICD-10-PCS | Mod: PBBFAC,TXP,, | Performed by: NURSE PRACTITIONER

## 2019-01-10 PROCEDURE — 81002 URINALYSIS NONAUTO W/O SCOPE: CPT | Mod: PBBFAC,NTX | Performed by: NURSE PRACTITIONER

## 2019-01-10 PROCEDURE — 99213 OFFICE O/P EST LOW 20 MIN: CPT | Mod: PBBFAC,NTX | Performed by: NURSE PRACTITIONER

## 2019-01-10 NOTE — PROGRESS NOTES
Subjective:       Patient ID: Mick Monroe is a 53 y.o. male.    Chief Complaint: Erectile Dysfunction      HPI: Mick Monroe is a 53 y.o. Black or  male who presents today for evaluation and management of erectile dysfunction. This is his initial clinic visit.    History of CAD with severe three vessel disease, essential hypertension and DM type II not on insulin, with CKD stage III, and CHF. Has ICD.   He is being evaluated for heart transplant candidacy.    Pt reports ED for the past 2 years. He states he is able to get an erection at times but cannot penetrate. Denies low libido. He has not tried anything in the past for ED. Denies urinary complaints. Denies dysuria, hematuria or flank pain. Denies fever or chills.   He takes isosorbide mononitrate for HF so unable to take oral ED medications.    Pt is unsure if he has had PSA done in the past. Denies family history of prostate cancer.    Review of patient's allergies indicates:  No Known Allergies    Current Outpatient Medications   Medication Sig Dispense Refill    aspirin (ECOTRIN) 81 MG EC tablet Take 81 mg by mouth Daily.      atorvastatin (LIPITOR) 40 MG tablet Take 1 tablet (40 mg total) by mouth every evening. 90 tablet 3    blood sugar diagnostic (ONETOUCH ULTRA BLUE TEST STRIP) Strp       furosemide (LASIX) 40 MG tablet Take 40 mg by mouth Daily.      glimepiride (AMARYL) 1 MG tablet Take 2 mg by mouth Daily.       hydrALAZINE (APRESOLINE) 10 MG tablet Take 1 tablet (10 mg total) by mouth 3 (three) times daily. 270 tablet 3    isosorbide mononitrate (IMDUR) 30 MG 24 hr tablet Take 1 tablet (30 mg total) by mouth Daily. 90 tablet 3    lancets (TRUEPLUS LANCETS) 30 gauge Misc       lancets (ULTRA THIN LANCETS) 30 gauge Misc USE TO CHECK GLUCOSE TWICE DAILY      metoprolol tartrate (LOPRESSOR) 25 MG tablet Take 1 tablet (25 mg total) by mouth 2 (two) times daily. 180 tablet 3    sacubitril-valsartan (ENTRESTO) 24-26 mg per  tablet Take 1 tablet by mouth 2 (two) times daily. 180 tablet 3    spironolactone (ALDACTONE) 25 MG tablet Take 1 tablet (25 mg total) by mouth Daily. 90 tablet 3     No current facility-administered medications for this visit.        Past Medical History:   Diagnosis Date    Arthritis     CHF (congestive heart failure)     Chronic renal disease, stage IV     Coronary atherosclerosis of unspecified type of vessel, native or graft     Diabetes mellitus     Hypertension        Past Surgical History:   Procedure Laterality Date    APPENDECTOMY      INSERTION-ICD-SINGLE N/A 8/31/2017    Performed by David Cline MD at [facility] CATH LAB       Family History   Problem Relation Age of Onset    Cancer Mother     Diabetes Father        Review of Systems   Constitutional: Negative for chills and fever.   HENT: Negative for congestion.    Eyes: Negative for discharge.   Respiratory: Negative for cough and shortness of breath.    Cardiovascular: Negative for chest pain.   Gastrointestinal: Negative for nausea and vomiting.   Genitourinary: Negative for decreased urine volume, difficulty urinating, discharge, dysuria, flank pain, frequency, hematuria, penile pain, penile swelling, scrotal swelling, testicular pain and urgency.        + ED   Musculoskeletal: Negative for gait problem.   Skin: Negative for rash.   Allergic/Immunologic: Negative for immunocompromised state.   Neurological: Negative for headaches.   Hematological: Negative for adenopathy.   Psychiatric/Behavioral: Negative for confusion.         All other systems were reviewed and were negative.    Objective:     Vitals:    01/10/19 1210   BP: 121/77   Pulse: 71        Physical Exam   Nursing note and vitals reviewed.  Constitutional: He is oriented to person, place, and time. He appears well-developed and well-nourished. No distress.   HENT:   Head: Normocephalic.   Eyes: Right eye exhibits no discharge. Left eye exhibits no discharge.   Neck: Normal range  of motion.   Cardiovascular: Regular rhythm.    Pulmonary/Chest: Effort normal. No respiratory distress.   Abdominal: Soft. He exhibits no distension.   Genitourinary: Right testis shows no mass, no swelling and no tenderness. Left testis shows no mass, no swelling and no tenderness. Circumcised. No penile erythema or penile tenderness. No discharge found.       Musculoskeletal: Normal range of motion.   Neurological: He is alert and oriented to person, place, and time.   Skin: Skin is warm and dry.     Psychiatric: He has a normal mood and affect. His behavior is normal. Judgment and thought content normal.         Lab Results   Component Value Date    CREATININE 1.8 (H) 12/05/2018     Lab Results   Component Value Date    EGFRNONAA 41.7 (A) 12/05/2018     Lab Results   Component Value Date    ESTGFRAFRICA 48.3 (A) 12/05/2018     POCT UA: sp grav 1.010, pH 5, negative results    Assessment:       1. Erectile dysfunction, unspecified erectile dysfunction type    2. Prostate cancer        Plan:     Mick was seen today for erectile dysfunction.    Diagnoses and all orders for this visit:    Erectile dysfunction, unspecified erectile dysfunction type  -     POCT urinalysis, dipstick or tablet reag    Prostate cancer  -     PSA, Screening; Future    -We discussed ED and the contributing factors. We reviewed his personal factors that contribute to ED. Patient was educated on ED treatments. We discussed CATRACHITA, ICI, and IPP.  -Pt would like to try ICI.  ICI is an injectable medication that consists of three different medications to produce an erection. It is known as a Trimix Compound or PEP. The medication is a compound medication and is only mixed up at certain pharmacies known as a compound pharmacy. The medication has to be stored in the refrigerator. Improper storage decreases the effectiveness of the medication.   -Message sent to Dr. Lau for clearance to start medication for ED.   -If cleared, will order trimix  and fax prescription to [facility].  -Will notify patient once prescription is faxed. Once medication is received by patient, he will call Urology clinic to schedule an appt with ARCENIO Knutson NP for first dose.  Pt verbalized understanding.  -PSA ordered and scheduled       I spent 35 minutes with the patient of which more than half was spent in coordinating the patient's care as well as in direct consultation with the patient in regards to our treatment and plan.

## 2019-01-10 NOTE — TELEPHONE ENCOUNTER
----- Message from Akosua Rich NP sent at 1/10/2019  4:31 PM CST -----  Please notify patient his PSA was normal and can repeat in 1 year.

## 2019-01-10 NOTE — PATIENT INSTRUCTIONS
After clearance from cardiologist, will order PEP injection medication and fax information to [facility].  Once you receive medication, please call [phone] to schedule appt with Jasmin Knutson NP to demonstrate how to use medication and to dose medication. First dose MUST BE administered in clinic with provider.      Penile Self-Injection Procedure  Self-injection is a good option for many men with erectile dysfunction (ED). A tiny needle is used to inject medication into the penis. This helps your penis get hard and stay that way long enough for sex. And sex and orgasm will feel as good as always. You may be nervous about doing self-injection at first. But with practice, it will get easier. Your healthcare provider will show you how to do self-injection the first time. The simple steps are outlined on this sheet.  Preparing for Injection  · Wash your hands well with soap and water.  · Prepare the medication (if needed).  · Sit or  a comfortable position in a warm, well-lit room. If you need to, sit or  front of a mirror.  · Find an injection site on one side of your penis, in a place with no visible veins. (Dont inject into the top, bottom, or head of the penis.)  · Clean the injection site with an alcohol swab. Grasp the head of your penis firmly with your thumb and forefinger (dont just pinch the skin). Stretch the penis so the skin on the shaft is taut.  Injecting the Medication  · Rest your penis against your inner thigh and pull it gently toward your knee. Dont twist or rotate it. This way youll be sure to inject the medication into the spot you chose and cleaned before.  · Hold the syringe between your thumb and fingers, like youre holding a pen. Rest your forearm on your thigh for support.  · Insert the needle at a 90-degree angle (perpendicular) to the shaft. Do this quickly to reduce discomfort. (The needle should go in easily. If it doesnt, stop right away.)  · Move your thumb to  the plunger. Press down to inject the medication, counting to 5.  · Remove the needle and dispose of it safely.     The injection site can be in any part of the shaded area.     Insert the needle straight into the penis.    Gaining an Erection  · Apply pressure to the injection site for a few minutes. This prevents swelling and bruising and helps spread the medication.   · Stand up. This may help your erection develop. Foreplay often helps, too.  · Your penis should become firm within 10-20 minutes. The erection will last long enough for sex, and maybe longer.  Call Your Doctor If You Have:   · An erection that lasts longer than 3-4  hours  · Bleeding or bruising  · Severe pain  · Scarring or curvature of the penis       © 8268-9801 [facility], [address] [place] [address], [place] [address]. All rights reserved. This information is not intended as a substitute for professional medical care. Always follow your healthcare professional's instructions.

## 2019-01-10 NOTE — LETTER
January 10, 2019      Jia Lau MD  [address]  [facility] [address]           [facility]  [address]  [facility] [address]  Phone: [phone]          Patient: Mick [place]   MR Number: [id number]   YOB: 1965   Date of Visit: 1/10/2019       Dear Dr. Jai Lau:    Thank you for referring Mick [place] to me for evaluation. Attached you will find relevant portions of my assessment and plan of care.    If you have questions, please do not hesitate to call me. I look forward to following Mick [place] along with you.    Sincerely,    Akosua Rich, NP    Enclosure  CC:  No Recipients    If you would like to receive this communication electronically, please contact [email or web address] or [phone] to request more information on [employer] Link access.    For providers and/or their staff who would like to refer a patient to [facility], please contact us through our one-stop-shop provider referral line, [facility], at [phone].    If you feel you have received this communication in error or would no longer like to receive these types of communications, please e-mail [email or web address]

## 2019-01-11 NOTE — TELEPHONE ENCOUNTER
Spoke with pt ;and notified him of normal PSA and will receive notice in mail to repeat psa in 1 year. Voiced understanding      ----- Message from Akosua Rich NP sent at 1/10/2019  4:31 PM CST -----  Please notify patient his PSA was normal and can repeat in 1 year.

## 2019-01-15 ENCOUNTER — TELEPHONE (OUTPATIENT)
Dept: UROLOGY | Facility: CLINIC | Age: 54
End: 2019-01-15

## 2019-01-15 DIAGNOSIS — N52.9 ERECTILE DYSFUNCTION, UNSPECIFIED ERECTILE DYSFUNCTION TYPE: Primary | ICD-10-CM

## 2019-01-15 NOTE — TELEPHONE ENCOUNTER
Dr. Lau stated it was safe for patient to use PEP injections for ED and for him to have intercourse.  Spoke with patient regarding clearance by Dr. Lau.  Trimix ordered. Prescription faxed to [facility]. Pharmacy will notify patient regarding cost as well as shipment information to send to patient.  Once patient received medication he will call to schedule appt with ARCENIO Knutson NP for first dose in clinic.  Pt verbalized understanding.        ----- Message from Jai Lau MD sent at 1/11/2019  8:32 AM CST -----  Regarding: RE: ED  Yes it is safe. H  ----- Message -----  From: Akosua Rich NP  Sent: 1/10/2019  12:44 PM  To: Jai Lau MD  Subject: ED                                               Dr. Lau,    Mr. [place] came to see me today for erectile dysfunction. I wanted to get clearance with you that it is safe for him to have sexual intercourse. He is interested in PEP injections for ED.    Thanks  Akosua Rich NP Urology

## 2019-01-16 RX ORDER — ATORVASTATIN CALCIUM 40 MG/1
TABLET, FILM COATED ORAL
Qty: 90 TABLET | Refills: 3 | Status: SHIPPED | OUTPATIENT
Start: 2019-01-16 | End: 2020-08-17 | Stop reason: SDUPTHER

## 2019-01-26 DIAGNOSIS — I50.42 CHRONIC COMBINED SYSTOLIC AND DIASTOLIC HEART FAILURE: Primary | ICD-10-CM

## 2019-01-27 RX ORDER — ISOSORBIDE MONONITRATE 30 MG/1
TABLET, EXTENDED RELEASE ORAL
Qty: 30 TABLET | Refills: 11 | Status: SHIPPED | OUTPATIENT
Start: 2019-01-27 | End: 2020-02-12

## 2019-01-27 RX ORDER — SACUBITRIL AND VALSARTAN 24; 26 MG/1; MG/1
TABLET, FILM COATED ORAL
Qty: 60 TABLET | Refills: 11 | Status: SHIPPED | OUTPATIENT
Start: 2019-01-27 | End: 2020-02-23

## 2019-01-31 ENCOUNTER — TELEPHONE (OUTPATIENT)
Dept: TRANSPLANT | Facility: CLINIC | Age: 54
End: 2019-01-31

## 2019-01-31 NOTE — TELEPHONE ENCOUNTER
Called pt to confirm that he received the Entresto. Pt verbally acknowledged. Discussed pt's issue with knee. Pt claims that he is having some discomfort/swelling above his right knee, but that the swelling has gone down over the past few days. Instructed pt to call PCP in [place] to request appointment to evaluate knee problem. Pt states that he has an appointment in April. Encouraged pt to call PCP to request appointment asap to address ongoing issue with r knee. Pt verbally agreed.

## 2019-01-31 NOTE — TELEPHONE ENCOUNTER
----- Message from Mary Grace Barnes sent at 1/31/2019  7:57 AM CST -----  Contact: self  Looks like the refill was taken care of the other day, but I can't tell if you saw the other part of this message from Monday.     mary grace  ----- Message -----  From: Argentina Fitch MA  Sent: 1/28/2019  11:31 AM  To: [facility] Pre-Heart Transplant Clinical        ----- Message -----  From: Eli Castañeda MA  Sent: 1/28/2019  11:13 AM  To: [facility] Heart Transplant Medical Assistants    Pt.is calling to get a refill for Entresto to [facility] [phone].Also Pt. Stated that he's been having numbness in rt.knee cap for about a month. Please call.  pt. Last visit 12/5/[phone] or [phone].

## 2019-04-09 DIAGNOSIS — I42.9 CARDIOMYOPATHY, UNSPECIFIED TYPE: Primary | ICD-10-CM

## 2019-04-10 ENCOUNTER — TELEPHONE (OUTPATIENT)
Dept: CARDIOLOGY | Facility: HOSPITAL | Age: 54
End: 2019-04-10

## 2019-04-10 ENCOUNTER — TELEPHONE (OUTPATIENT)
Dept: ELECTROPHYSIOLOGY | Facility: CLINIC | Age: 54
End: 2019-04-10

## 2019-04-10 NOTE — TELEPHONE ENCOUNTER
Spoke with patient and moved his device clinic appointment.   ----- Message from Ninfa Jeff sent at 4/10/2019 11:05 AM CDT -----  Contact: Self  .Patient Returning Call from [facility]    Who Left Message for Patient: Jenise  Communication Preference:  [phone]  Additional Information: Pt had wrong date & time.

## 2019-05-02 ENCOUNTER — TELEPHONE (OUTPATIENT)
Dept: TRANSPLANT | Facility: CLINIC | Age: 54
End: 2019-05-02

## 2019-05-02 NOTE — TELEPHONE ENCOUNTER
"Returned call to pt who reports he needs surgery for "this thing on the back of my head and my doctor needs a clearance from Dr Lau".     Pt is unable to give details as to what type of surgery he needs nor what type of anesthesia he will have.     Advised pt to contact his local MD and ask that information regarding type of procedure and anticipated anesthesia be faxed to [facility] in order for cardiac clearance to be discussed with Dr Lau.     Pt reports he is currently driving and unable to write down the fax number.  He requested a voicemail be left at his home number that he can retrieve later.   Voicemail left as requested with fax number for his primary coordinator.    Primary coordinator notified electronically.       "

## 2019-05-02 NOTE — TELEPHONE ENCOUNTER
----- Message from Park Domingo sent at 5/2/2019  3:21 PM CDT -----  Contact: Patient  The Pt is calling to get a clearance. Please call him back @ [phone] or [phone]. Thanks, Park

## 2019-05-03 ENCOUNTER — TELEPHONE (OUTPATIENT)
Dept: TRANSPLANT | Facility: CLINIC | Age: 54
End: 2019-05-03

## 2019-05-03 NOTE — TELEPHONE ENCOUNTER
----- Message from Lauren Camarillo MA sent at 5/3/2019 11:47 AM CDT -----  Contact: Ria/ [facility] [place]  Please call Ria at [phone] she need to talk to you about his surgery. Thank you.

## 2019-05-08 DIAGNOSIS — I50.42 CHRONIC COMBINED SYSTOLIC AND DIASTOLIC HEART FAILURE: Primary | ICD-10-CM

## 2019-06-04 NOTE — PROGRESS NOTES
Mr. [place] is a patient of Dr. Cline and was last seen in clinic 3/14/2018.      Subjective:   Patient ID:  Mick [place] Jr. is a 54 y.o. male who presents for follow-up of Cardiomyopathy  .     HPI:    Mr. [place] is a 54 y.o. male with ischemic cardiomyopathy, CAD, CHF, HTN, DM, CKD IV here for annual follow up.     Background:    Primary cardiologist is Dr. West.  Also sees Dr. Lau.  Initially presented with CHF and new-onset cardiomyopathy (12/17).    Echo (12/16) EF 10-15%>>LifeVest placed.   LHC (12/21/16) PCI to LAD and RCA, subtotal occlusion of Cx>>placed on optimal medical therapy.  Echo (04/24/17) EF 25-30%  At his initial EP visit, Mr. [place] reported experiencing dyspnea on exertion c/w NYHA Class II CHF>>denied syncope, palpitations.  He underwent successful SC ICD placement (08/31/17) without complication.   Felt well afterward.    Update (06/05/2019):    Today he has no cardiac complaints. Mr. [place] denies chest pain with exertion or at rest, palpitations, SOB, KONG, dizziness, or syncope. He is scheduled to have a subcutaneous cyst behind his right ear removed, and is a bit anxious about this. Is scheduled for cardiac clearance with Dr. Lau following this appointment.     Device Interrogation (6/4/2019) reveals an intrinsic sinus rhythm with stable lead and device function. No arrhythmias or treated episodes were noted.  He paces 0% in the RA and 0% in the RV. Battery 3.11V.    I have personally reviewed the patient's EKG today, which shows sinus rhythm with 1st degree AVB at 72bpm. AK interval is 210. QRS is 94. QTc is 459.    Recent Cardiac Tests:    2D Echo (6/26/2018):  CONCLUSIONS     1 - Upper limit of normal left ventricular enlargement.     2 - Moderately depressed left ventricular systolic function (EF upper 30s to low 40s).     3 - Impaired LV relaxation, normal LAP (grade 1 diastolic dysfunction).     4 - Right ventricular enlargement with mildly depressed systolic  function.     5 - Trivial aortic regurgitation.     6 - Mild tricuspid regurgitation.     7 - Trivial pulmonic regurgitation.     8 - The estimated PA systolic pressure is 25 mmHg.     Current Outpatient Medications   Medication Sig    aspirin (ECOTRIN) 81 MG EC tablet Take 81 mg by mouth Daily.    atorvastatin (LIPITOR) 40 MG tablet TAKE ONE TABLET BY MOUTH IN THE EVENING    blood sugar diagnostic (ONETOUCH ULTRA BLUE TEST STRIP) Strp     ENTRESTO 24-26 mg per tablet TAKE ONE TABLET BY MOUTH TWICE DAILY    furosemide (LASIX) 40 MG tablet Take 40 mg by mouth Daily.    glimepiride (AMARYL) 1 MG tablet Take 2 mg by mouth Daily.     isosorbide mononitrate (IMDUR) 30 MG 24 hr tablet TAKE ONE TABLET BY MOUTH ONCE DAILY    lancets (TRUEPLUS LANCETS) 30 gauge Misc     lancets (ULTRA THIN LANCETS) 30 gauge Misc USE TO CHECK GLUCOSE TWICE DAILY    metoprolol tartrate (LOPRESSOR) 25 MG tablet Take 1 tablet (25 mg total) by mouth 2 (two) times daily.    pep injection Give a TEST DOSE; 1st dose to be given under medical supervision.    For compounding pharmacy use:   Add PAPAVERINE 30 mcg  Add PHENTOLAMINE 10 mg  Add ALPROSTADIL 100 mcg    spironolactone (ALDACTONE) 25 MG tablet Take 1 tablet (25 mg total) by mouth Daily.     No current facility-administered medications for this visit.      Review of Systems   Constitution: Negative for malaise/fatigue.   Cardiovascular: Negative for chest pain, dyspnea on exertion, irregular heartbeat, leg swelling and palpitations.   Respiratory: Negative for shortness of breath.    Hematologic/Lymphatic: Negative for bleeding problem.   Skin: Negative for rash.   Musculoskeletal: Negative for myalgias.   Gastrointestinal: Negative for hematemesis, hematochezia and nausea.   Genitourinary: Negative for hematuria.   Neurological: Negative for light-headedness.   Psychiatric/Behavioral: Negative for altered mental status.   Allergic/Immunologic: Negative for persistent infections.  "    Objective:        /78   Pulse 72   Ht 5' 5" (1.651 m)   Wt 84.2 kg (185 lb 10 oz)   BMI 30.89 kg/m²     Physical Exam   Constitutional: He is oriented to person, place, and time. He appears well-developed and well-nourished.   HENT:   Head: Normocephalic.   Nose: Nose normal.   Eyes: Pupils are equal, round, and reactive to light.   Cardiovascular: Normal rate, regular rhythm, S1 normal and S2 normal.   No murmur heard.  Pulses:       Radial pulses are 2+ on the right side, and 2+ on the left side.   Pulmonary/Chest: Breath sounds normal. No respiratory distress.   Device to LUCW.   Abdominal: Normal appearance.   Musculoskeletal: Normal range of motion. He exhibits no edema.   Neurological: He is alert and oriented to person, place, and time.   Skin: Skin is warm and dry. No erythema.   Psychiatric: He has a normal mood and affect. His speech is normal and behavior is normal.   Nursing note and vitals reviewed.      Lab Results   Component Value Date     (L) 06/05/2019     (L) 06/05/2019    K 4.0 06/05/2019    K 4.0 06/05/2019    BUN 35 (H) 06/05/2019    BUN 35 (H) 06/05/2019    CREATININE 2.5 (H) 06/05/2019    CREATININE 2.5 (H) 06/05/2019    ALT 14 06/05/2019    AST 15 06/05/2019    HGB 12.9 (L) 06/05/2019    HCT 39.4 (L) 06/05/2019       Recent Labs   Lab 08/31/17  0951   INR 1.0       Assessment:     1. ICD (implantable cardioverter-defibrillator), single, in situ    2. Dilated cardiomyopathy    3. Essential hypertension    4. Cardiomyopathy, unspecified type      Plan:     In summary, Mr. [place] is a 54 y.o. male with ischemic cardiomyopathy, CAD, CHF, Htn, DM, CKD IV here for annual follow up.   Mr. [place] is doing well from a device perspective with stable lead and device function. No arrhythmia noted. No RV pacing. No CHF symptoms.     Continue current medication regimen and device settings.   Follow up in device clinic as scheduled.   Follow up in EP clinic in 1 year, sooner " as needed.     *A copy of this note has been sent to Dr. Cline*    Follow up in about 1 year (around 6/5/2020).    ------------------------------------------------------------------    NOEMI Chilel, NP-C  Cardiac Electrophysiology

## 2019-06-05 ENCOUNTER — CLINICAL SUPPORT (OUTPATIENT)
Dept: CARDIOLOGY | Facility: HOSPITAL | Age: 54
End: 2019-06-05
Attending: INTERNAL MEDICINE
Payer: MEDICAID

## 2019-06-05 ENCOUNTER — LAB VISIT (OUTPATIENT)
Dept: LAB | Facility: HOSPITAL | Age: 54
End: 2019-06-05
Payer: MEDICAID

## 2019-06-05 ENCOUNTER — OFFICE VISIT (OUTPATIENT)
Dept: TRANSPLANT | Facility: CLINIC | Age: 54
End: 2019-06-05
Payer: MEDICAID

## 2019-06-05 ENCOUNTER — OFFICE VISIT (OUTPATIENT)
Dept: ELECTROPHYSIOLOGY | Facility: CLINIC | Age: 54
End: 2019-06-05
Payer: MEDICAID

## 2019-06-05 VITALS
HEIGHT: 65 IN | DIASTOLIC BLOOD PRESSURE: 78 MMHG | WEIGHT: 185.63 LBS | BODY MASS INDEX: 30.93 KG/M2 | BODY MASS INDEX: 30.93 KG/M2 | SYSTOLIC BLOOD PRESSURE: 112 MMHG | HEART RATE: 72 BPM | DIASTOLIC BLOOD PRESSURE: 81 MMHG | HEIGHT: 65 IN | WEIGHT: 185.63 LBS | SYSTOLIC BLOOD PRESSURE: 139 MMHG | HEART RATE: 63 BPM

## 2019-06-05 DIAGNOSIS — I50.42 CHRONIC COMBINED SYSTOLIC AND DIASTOLIC HEART FAILURE: ICD-10-CM

## 2019-06-05 DIAGNOSIS — I25.5 ISCHEMIC DILATED CARDIOMYOPATHY: ICD-10-CM

## 2019-06-05 DIAGNOSIS — Z95.810 ICD (IMPLANTABLE CARDIOVERTER-DEFIBRILLATOR), SINGLE, IN SITU: ICD-10-CM

## 2019-06-05 DIAGNOSIS — I42.0 ISCHEMIC DILATED CARDIOMYOPATHY: ICD-10-CM

## 2019-06-05 DIAGNOSIS — I42.0 DILATED CARDIOMYOPATHY: Primary | ICD-10-CM

## 2019-06-05 DIAGNOSIS — I42.0 DILATED CARDIOMYOPATHY: ICD-10-CM

## 2019-06-05 DIAGNOSIS — I10 ESSENTIAL HYPERTENSION: ICD-10-CM

## 2019-06-05 DIAGNOSIS — I50.9 CHF (CONGESTIVE HEART FAILURE): ICD-10-CM

## 2019-06-05 DIAGNOSIS — I25.10 CORONARY ARTERY DISEASE INVOLVING NATIVE CORONARY ARTERY OF NATIVE HEART WITHOUT ANGINA PECTORIS: ICD-10-CM

## 2019-06-05 DIAGNOSIS — N18.4 CHRONIC KIDNEY DISEASE, STAGE 4, SEVERELY DECREASED GFR: ICD-10-CM

## 2019-06-05 DIAGNOSIS — I42.9 CARDIOMYOPATHY, UNSPECIFIED TYPE: ICD-10-CM

## 2019-06-05 DIAGNOSIS — Z95.810 ICD (IMPLANTABLE CARDIOVERTER-DEFIBRILLATOR), SINGLE, IN SITU: Primary | ICD-10-CM

## 2019-06-05 DIAGNOSIS — Z95.810 AICD (AUTOMATIC CARDIOVERTER/DEFIBRILLATOR) PRESENT: ICD-10-CM

## 2019-06-05 DIAGNOSIS — I50.22 CHRONIC SYSTOLIC HEART FAILURE: ICD-10-CM

## 2019-06-05 LAB
ALBUMIN SERPL BCP-MCNC: 3.8 G/DL (ref 3.5–5.2)
ALP SERPL-CCNC: 91 U/L (ref 55–135)
ALT SERPL W/O P-5'-P-CCNC: 14 U/L (ref 10–44)
ANION GAP SERPL CALC-SCNC: 7 MMOL/L (ref 8–16)
ANION GAP SERPL CALC-SCNC: 7 MMOL/L (ref 8–16)
AST SERPL-CCNC: 15 U/L (ref 10–40)
BASOPHILS # BLD AUTO: 0.03 K/UL (ref 0–0.2)
BASOPHILS NFR BLD: 0.5 % (ref 0–1.9)
BILIRUB SERPL-MCNC: 0.4 MG/DL (ref 0.1–1)
BUN SERPL-MCNC: 35 MG/DL (ref 6–20)
BUN SERPL-MCNC: 35 MG/DL (ref 6–20)
CALCIUM SERPL-MCNC: 9.8 MG/DL (ref 8.7–10.5)
CALCIUM SERPL-MCNC: 9.8 MG/DL (ref 8.7–10.5)
CHLORIDE SERPL-SCNC: 101 MMOL/L (ref 95–110)
CHLORIDE SERPL-SCNC: 101 MMOL/L (ref 95–110)
CO2 SERPL-SCNC: 27 MMOL/L (ref 23–29)
CO2 SERPL-SCNC: 27 MMOL/L (ref 23–29)
CREAT SERPL-MCNC: 2.5 MG/DL (ref 0.5–1.4)
CREAT SERPL-MCNC: 2.5 MG/DL (ref 0.5–1.4)
DIFFERENTIAL METHOD: ABNORMAL
EOSINOPHIL # BLD AUTO: 0.1 K/UL (ref 0–0.5)
EOSINOPHIL NFR BLD: 1.8 % (ref 0–8)
ERYTHROCYTE [DISTWIDTH] IN BLOOD BY AUTOMATED COUNT: 13.2 % (ref 11.5–14.5)
EST. GFR  (AFRICAN AMERICAN): 32.2 ML/MIN/1.73 M^2
EST. GFR  (AFRICAN AMERICAN): 32.2 ML/MIN/1.73 M^2
EST. GFR  (NON AFRICAN AMERICAN): 27.9 ML/MIN/1.73 M^2
EST. GFR  (NON AFRICAN AMERICAN): 27.9 ML/MIN/1.73 M^2
GLUCOSE SERPL-MCNC: 198 MG/DL (ref 70–110)
GLUCOSE SERPL-MCNC: 198 MG/DL (ref 70–110)
HCT VFR BLD AUTO: 39.4 % (ref 40–54)
HGB BLD-MCNC: 12.9 G/DL (ref 14–18)
IMM GRANULOCYTES # BLD AUTO: 0.03 K/UL (ref 0–0.04)
IMM GRANULOCYTES NFR BLD AUTO: 0.5 % (ref 0–0.5)
LYMPHOCYTES # BLD AUTO: 2 K/UL (ref 1–4.8)
LYMPHOCYTES NFR BLD: 34.3 % (ref 18–48)
MCH RBC QN AUTO: 28.2 PG (ref 27–31)
MCHC RBC AUTO-ENTMCNC: 32.7 G/DL (ref 32–36)
MCV RBC AUTO: 86 FL (ref 82–98)
MONOCYTES # BLD AUTO: 0.7 K/UL (ref 0.3–1)
MONOCYTES NFR BLD: 12.8 % (ref 4–15)
NEUTROPHILS # BLD AUTO: 2.9 K/UL (ref 1.8–7.7)
NEUTROPHILS NFR BLD: 50.1 % (ref 38–73)
NRBC BLD-RTO: 0 /100 WBC
PLATELET # BLD AUTO: 295 K/UL (ref 150–350)
PMV BLD AUTO: 8.8 FL (ref 9.2–12.9)
POTASSIUM SERPL-SCNC: 4 MMOL/L (ref 3.5–5.1)
POTASSIUM SERPL-SCNC: 4 MMOL/L (ref 3.5–5.1)
PROT SERPL-MCNC: 8.4 G/DL (ref 6–8.4)
RBC # BLD AUTO: 4.58 M/UL (ref 4.6–6.2)
SODIUM SERPL-SCNC: 135 MMOL/L (ref 136–145)
SODIUM SERPL-SCNC: 135 MMOL/L (ref 136–145)
WBC # BLD AUTO: 5.69 K/UL (ref 3.9–12.7)

## 2019-06-05 PROCEDURE — 99213 OFFICE O/P EST LOW 20 MIN: CPT | Mod: PBBFAC,25,27,TXP | Performed by: NURSE PRACTITIONER

## 2019-06-05 PROCEDURE — 85025 COMPLETE CBC W/AUTO DIFF WBC: CPT | Mod: NTX

## 2019-06-05 PROCEDURE — 93010 ELECTROCARDIOGRAM REPORT: CPT | Mod: S$PBB,NTX,, | Performed by: INTERNAL MEDICINE

## 2019-06-05 PROCEDURE — 99999 PR PBB SHADOW E&M-EST. PATIENT-LVL III: ICD-10-PCS | Mod: PBBFAC,TXP,, | Performed by: INTERNAL MEDICINE

## 2019-06-05 PROCEDURE — 80053 COMPREHEN METABOLIC PANEL: CPT | Mod: NTX

## 2019-06-05 PROCEDURE — 99999 PR PBB SHADOW E&M-EST. PATIENT-LVL III: CPT | Mod: PBBFAC,TXP,, | Performed by: INTERNAL MEDICINE

## 2019-06-05 PROCEDURE — 99215 OFFICE O/P EST HI 40 MIN: CPT | Mod: S$PBB,NTX,, | Performed by: INTERNAL MEDICINE

## 2019-06-05 PROCEDURE — 99214 PR OFFICE/OUTPT VISIT, EST, LEVL IV, 30-39 MIN: ICD-10-PCS | Mod: S$PBB,NTX,, | Performed by: NURSE PRACTITIONER

## 2019-06-05 PROCEDURE — 36415 COLL VENOUS BLD VENIPUNCTURE: CPT | Mod: NTX

## 2019-06-05 PROCEDURE — 99214 OFFICE O/P EST MOD 30 MIN: CPT | Mod: S$PBB,NTX,, | Performed by: NURSE PRACTITIONER

## 2019-06-05 PROCEDURE — 99213 OFFICE O/P EST LOW 20 MIN: CPT | Mod: PBBFAC,25,TXP | Performed by: INTERNAL MEDICINE

## 2019-06-05 PROCEDURE — 93005 ELECTROCARDIOGRAM TRACING: CPT | Mod: PBBFAC,NTX | Performed by: INTERNAL MEDICINE

## 2019-06-05 PROCEDURE — 99999 PR PBB SHADOW E&M-EST. PATIENT-LVL III: CPT | Mod: PBBFAC,TXP,, | Performed by: NURSE PRACTITIONER

## 2019-06-05 PROCEDURE — 99999 PR PBB SHADOW E&M-EST. PATIENT-LVL III: ICD-10-PCS | Mod: PBBFAC,TXP,, | Performed by: NURSE PRACTITIONER

## 2019-06-05 PROCEDURE — 93010 RHYTHM STRIP: ICD-10-PCS | Mod: S$PBB,NTX,, | Performed by: INTERNAL MEDICINE

## 2019-06-05 PROCEDURE — 93282 PRGRMG EVAL IMPLANTABLE DFB: CPT | Mod: TXP

## 2019-06-05 PROCEDURE — 99215 PR OFFICE/OUTPT VISIT, EST, LEVL V, 40-54 MIN: ICD-10-PCS | Mod: S$PBB,NTX,, | Performed by: INTERNAL MEDICINE

## 2019-06-05 NOTE — LETTER
June 5, 2019        Juan C West  [address] [address]  Phone: [phone]  Fax: [phone]             [facility] [address]  [facility] [address]  Phone: [phone]   Patient: Mick [place] Jr.   MR Number: [id number]   YOB: 1965   Date of Visit: 6/5/2019       Dear Dr. Juan C West    Thank you for referring Mick [place] to me for evaluation. Attached you will find relevant portions of my assessment and plan of care.    If you have questions, please do not hesitate to call me. I look forward to following Mick [place] along with you.    Sincerely,    Jai Lau MD    Enclosure    If you would like to receive this communication electronically, please contact [email or web address] or [phone] to request [employer] Link access.    [employer] Link is a tool which provides read-only access to select patient information with whom you have a relationship. Its easy to use and provides real time access to review your patients record including encounter summaries, notes, results, and demographic information.    If you feel you have received this communication in error or would no longer like to receive these types of communications, please e-mail [email or web address]

## 2019-06-05 NOTE — PROGRESS NOTES
Subjective:   Initial evaluation of heart transplant candidacy.    HPI:  Mr. [place] is a 54 y.o. year old  male who has presented to be considered for advanced surgical options (LVAD/OHT).     He comes from [place]. History of CAD with severe three vessel disease,  Last 2D echo in 6/2017 with EF 25%. He also has a history of essential hypertension and DM type II not on insulin, with CKD stage III (creatinine 2.0).Doing well at this time denies any heart failure symptoms. Reports adherence with his HF medications.     KONG FC II NYHA . Underwent placement of ICD 8/2017 and since then has been on disability.     No orthopnea or PND, he is complaint with low salt diet. Medication complaint as well.     Needs a cyst removal        CONCLUSIONS     1 - Upper limit of normal left ventricular enlargement.     2 - Moderately depressed left ventricular systolic function (EF upper 30s to low 40s).     3 - Impaired LV relaxation, normal LAP (grade 1 diastolic dysfunction).     4 - Right ventricular enlargement with mildly depressed systolic function.     5 - Trivial aortic regurgitation.     6 - Mild tricuspid regurgitation.     7 - Trivial pulmonic regurgitation.     8 - The estimated PA systolic pressure is 25 mmHg.     Past Medical History:   Diagnosis Date    Arthritis     CHF (congestive heart failure)     Chronic renal disease, stage IV     Coronary atherosclerosis of unspecified type of vessel, native or graft     Diabetes mellitus     Hypertension      Past Surgical History:   Procedure Laterality Date    APPENDECTOMY      INSERTION-ICD-SINGLE N/A 8/31/2017    Performed by David Cline MD at [facility] CATH LAB       Family History   Problem Relation Age of Onset    Cancer Mother     Diabetes Father     Cataracts Father     Cataracts Maternal Aunt     Blindness Maternal Uncle     Amblyopia Neg Hx     Glaucoma Neg Hx     Retinal detachment Neg Hx     Macular degeneration Neg Hx     Strabismus  Neg Hx        Review of Systems   Constitution: Negative for chills, decreased appetite, diaphoresis, fever, malaise/fatigue, night sweats, weight gain and weight loss.   HENT: Negative for hearing loss and sore throat.    Eyes: Negative for photophobia, vision loss in left eye and vision loss in right eye.   Cardiovascular: Negative for chest pain, claudication, cyanosis, dyspnea on exertion, irregular heartbeat, leg swelling, near-syncope, orthopnea and palpitations.   Respiratory: Negative for cough, hemoptysis, shortness of breath, sleep disturbances due to breathing, snoring, sputum production and wheezing.    Skin: Negative for color change, dry skin and rash.   Gastrointestinal: Negative for abdominal pain and diarrhea.   Genitourinary: Negative for hematuria.   Neurological: Negative for headaches, light-headedness and weakness.   Psychiatric/Behavioral: Negative for depression.       Objective:  Vitals:    06/05/19 1043   BP: 139/81   Pulse: 63        Physical Exam   Constitutional: He is oriented to person, place, and time. He appears well-developed and well-nourished.   HENT:   Head: Normocephalic and atraumatic.   Eyes: Pupils are equal, round, and reactive to light. Conjunctivae and EOM are normal.   Neck: Normal range of motion. Neck supple. No JVD present.   Cardiovascular: Normal rate and regular rhythm. Exam reveals no gallop and no friction rub.   No murmur heard.  Pulmonary/Chest: Breath sounds normal. No respiratory distress. He has no wheezes. He has no rales. He exhibits no tenderness.   Abdominal: Soft. Bowel sounds are normal. He exhibits no distension and no mass. There is no hepatosplenomegaly, splenomegaly or hepatomegaly. There is no tenderness. There is no rebound, no guarding and no CVA tenderness.   No hepatoslenomegaly   Musculoskeletal: Normal range of motion. He exhibits no edema or tenderness.   Neurological: He is alert and oriented to person, place, and time. He has normal  reflexes. No cranial nerve deficit. He exhibits normal muscle tone. Coordination normal.   Skin: Skin is warm and dry.   Psychiatric: He has a normal mood and affect.       Labs:      Chemistry        Component Value Date/Time     (L) 06/05/2019 0812     (L) 06/05/2019 0812    K 4.0 06/05/2019 0812    K 4.0 06/05/2019 0812     06/05/2019 0812     06/05/2019 0812    CO2 27 06/05/2019 0812    CO2 27 06/05/2019 0812    BUN 35 (H) 06/05/2019 0812    BUN 35 (H) 06/05/2019 0812    CREATININE 2.5 (H) 06/05/2019 0812    CREATININE 2.5 (H) 06/05/2019 0812     (H) 06/05/2019 0812     (H) 06/05/2019 0812        Component Value Date/Time    CALCIUM 9.8 06/05/2019 0812    CALCIUM 9.8 06/05/2019 0812    ALKPHOS 91 06/05/2019 0812    AST 15 06/05/2019 0812    ALT 14 06/05/2019 0812    BILITOT 0.4 06/05/2019 0812          No results found for: MG  Lab Results   Component Value Date    WBC 5.69 06/05/2019    HGB 12.9 (L) 06/05/2019    HCT 39.4 (L) 06/05/2019    MCV 86 06/05/2019     06/05/2019     BNP   Date Value Ref Range Status   12/05/2018 18 0 - 99 pg/mL Final     Comment:     Values of less than 100 pg/ml are consistent with non-CHF populations.   06/26/2018 48 0 - 99 pg/mL Final     Comment:     Values of less than 100 pg/ml are consistent with non-CHF populations.   12/19/2017 61 0 - 99 pg/mL Final     Comment:     Values of less than 100 pg/ml are consistent with non-CHF populations.     No results found for this or any previous visit.    Labs were reviewed with the patient.    Assessment:      1. Dilated cardiomyopathy    2. Coronary artery disease involving native coronary artery of native heart without angina pectoris    3. Chronic systolic heart failure    4. Essential hypertension    5. ICD (implantable cardioverter-defibrillator), single, in situ    6. Ischemic dilated cardiomyopathy    7. Chronic kidney disease, stage 4, severely decreased GFR        Plan:   S/p ICD  placement in 8/2017. Continue on HF medications. Stable    He is medium risk for cyst removal from cardiac standpoint    · Heart Failure Stage ACC/AHA stage C  · Functional Class NYHA II  · Recommend 2 gram sodium restriction and 1500 cc fluid restriction.  · Encourage physical activity with graded exercise program.  · Requested patient to weigh themselves daily, and to notify us if their weight increases by more than 3 lbs in 1 day or 5 lbs in 1 week.       Transplant Candidacy: Patient is a 54 y.o. year old male with heart failure is being seen for possible LVAD and OHT. In my opinion, he is  a suitable LVAD and OHT candidate..     UNOS Patient Status  Functional Status: 90% - Able to carry on normal activity: minor symptoms of disease  Physical Capacity: Limited Mobility  Working for Income: No  If no, reason not working: Disability     RTC 6 months

## 2019-07-15 ENCOUNTER — CLINICAL SUPPORT (OUTPATIENT)
Dept: CARDIOLOGY | Facility: HOSPITAL | Age: 54
End: 2019-07-15
Attending: INTERNAL MEDICINE

## 2019-07-15 ENCOUNTER — TELEPHONE (OUTPATIENT)
Dept: CARDIOLOGY | Facility: HOSPITAL | Age: 54
End: 2019-07-15

## 2019-07-15 DIAGNOSIS — I42.0 ISCHEMIC DILATED CARDIOMYOPATHY: ICD-10-CM

## 2019-07-15 DIAGNOSIS — I25.5 ISCHEMIC DILATED CARDIOMYOPATHY: ICD-10-CM

## 2019-07-15 DIAGNOSIS — Z95.810 AICD (AUTOMATIC CARDIOVERTER/DEFIBRILLATOR) PRESENT: ICD-10-CM

## 2019-07-15 PROCEDURE — 93296 REM INTERROG EVL PM/IDS: CPT | Mod: TXP

## 2019-07-15 NOTE — TELEPHONE ENCOUNTER
----- Message from Maria Luisa Montoya sent at 7/15/2019  9:21 AM CDT -----  Contact: pt  Pt is calling to find out if his remote device check went through and can be reached at [phone].    Thank you

## 2019-09-23 ENCOUNTER — TELEPHONE (OUTPATIENT)
Dept: TRANSPLANT | Facility: CLINIC | Age: 54
End: 2019-09-23

## 2019-10-15 ENCOUNTER — CLINICAL SUPPORT (OUTPATIENT)
Dept: CARDIOLOGY | Facility: HOSPITAL | Age: 54
End: 2019-10-15
Attending: INTERNAL MEDICINE
Payer: MEDICARE

## 2019-10-15 DIAGNOSIS — Z95.810 AICD (AUTOMATIC CARDIOVERTER/DEFIBRILLATOR) PRESENT: ICD-10-CM

## 2019-10-15 DIAGNOSIS — I25.5 ISCHEMIC DILATED CARDIOMYOPATHY: ICD-10-CM

## 2019-10-15 DIAGNOSIS — I42.0 ISCHEMIC DILATED CARDIOMYOPATHY: ICD-10-CM

## 2019-10-15 PROCEDURE — 93296 REM INTERROG EVL PM/IDS: CPT | Mod: NTX | Performed by: INTERNAL MEDICINE

## 2019-10-15 PROCEDURE — 93295 DEV INTERROG REMOTE 1/2/MLT: CPT | Mod: NTX,,, | Performed by: INTERNAL MEDICINE

## 2019-10-15 PROCEDURE — 93295 CARDIAC DEVICE CHECK - REMOTE: ICD-10-PCS | Mod: NTX,,, | Performed by: INTERNAL MEDICINE

## 2019-10-21 ENCOUNTER — CLINICAL SUPPORT (OUTPATIENT)
Dept: CARDIOLOGY | Facility: HOSPITAL | Age: 54
End: 2019-10-21
Attending: INTERNAL MEDICINE
Payer: MEDICARE

## 2019-10-21 DIAGNOSIS — I25.5 ISCHEMIC DILATED CARDIOMYOPATHY: ICD-10-CM

## 2019-10-21 DIAGNOSIS — Z95.810 AICD (AUTOMATIC CARDIOVERTER/DEFIBRILLATOR) PRESENT: ICD-10-CM

## 2019-10-21 DIAGNOSIS — I42.0 ISCHEMIC DILATED CARDIOMYOPATHY: ICD-10-CM

## 2019-10-21 PROCEDURE — 93296 REM INTERROG EVL PM/IDS: CPT | Mod: TXP

## 2019-10-23 ENCOUNTER — TELEPHONE (OUTPATIENT)
Dept: TRANSPLANT | Facility: CLINIC | Age: 54
End: 2019-10-23

## 2019-10-23 DIAGNOSIS — I50.42 CHRONIC COMBINED SYSTOLIC AND DIASTOLIC HEART FAILURE: ICD-10-CM

## 2019-10-23 DIAGNOSIS — I42.0 DILATED CARDIOMYOPATHY: Primary | ICD-10-CM

## 2019-11-12 ENCOUNTER — TELEPHONE (OUTPATIENT)
Dept: TRANSPLANT | Facility: CLINIC | Age: 54
End: 2019-11-12

## 2019-11-12 NOTE — TELEPHONE ENCOUNTER
Completed chart review with Dr Stuart and the Transplant Team. Care of patient is being transferred to CHF section from Preht section, per Dr. Stuart. Transplant episode closed.    Dx: Ischemic Dilated Cardiomypathy  Reason: Stability  Pt is not on home inotrope therapy.  Lab Scheduled 12/02/19  Lab- [facility]  FU with Dr Lau scheduled 12/2

## 2019-12-02 ENCOUNTER — LAB VISIT (OUTPATIENT)
Dept: LAB | Facility: HOSPITAL | Age: 54
End: 2019-12-02
Payer: MEDICARE

## 2019-12-02 ENCOUNTER — OFFICE VISIT (OUTPATIENT)
Dept: TRANSPLANT | Facility: CLINIC | Age: 54
End: 2019-12-02
Payer: MEDICARE

## 2019-12-02 VITALS
WEIGHT: 195.56 LBS | HEART RATE: 72 BPM | HEIGHT: 65 IN | DIASTOLIC BLOOD PRESSURE: 95 MMHG | BODY MASS INDEX: 32.58 KG/M2 | SYSTOLIC BLOOD PRESSURE: 145 MMHG

## 2019-12-02 DIAGNOSIS — I25.10 CORONARY ARTERY DISEASE INVOLVING NATIVE CORONARY ARTERY OF NATIVE HEART WITHOUT ANGINA PECTORIS: ICD-10-CM

## 2019-12-02 DIAGNOSIS — N18.4 CHRONIC KIDNEY DISEASE, STAGE 4, SEVERELY DECREASED GFR: ICD-10-CM

## 2019-12-02 DIAGNOSIS — I25.5 ISCHEMIC CARDIOMYOPATHY: ICD-10-CM

## 2019-12-02 DIAGNOSIS — I50.22 CHRONIC SYSTOLIC HEART FAILURE: ICD-10-CM

## 2019-12-02 DIAGNOSIS — Z95.810 ICD (IMPLANTABLE CARDIOVERTER-DEFIBRILLATOR), SINGLE, IN SITU: ICD-10-CM

## 2019-12-02 DIAGNOSIS — I25.5 ISCHEMIC DILATED CARDIOMYOPATHY: Primary | ICD-10-CM

## 2019-12-02 DIAGNOSIS — I10 ESSENTIAL HYPERTENSION: ICD-10-CM

## 2019-12-02 DIAGNOSIS — I50.42 CHRONIC COMBINED SYSTOLIC AND DIASTOLIC HEART FAILURE: ICD-10-CM

## 2019-12-02 DIAGNOSIS — I42.0 DILATED CARDIOMYOPATHY: ICD-10-CM

## 2019-12-02 DIAGNOSIS — I42.0 ISCHEMIC DILATED CARDIOMYOPATHY: Primary | ICD-10-CM

## 2019-12-02 LAB
ALBUMIN SERPL BCP-MCNC: 3.8 G/DL (ref 3.5–5.2)
ALP SERPL-CCNC: 80 U/L (ref 55–135)
ALT SERPL W/O P-5'-P-CCNC: 15 U/L (ref 10–44)
ANION GAP SERPL CALC-SCNC: 10 MMOL/L (ref 8–16)
AST SERPL-CCNC: 14 U/L (ref 10–40)
BILIRUB SERPL-MCNC: 0.5 MG/DL (ref 0.1–1)
BNP SERPL-MCNC: 38 PG/ML (ref 0–99)
BUN SERPL-MCNC: 22 MG/DL (ref 6–20)
CALCIUM SERPL-MCNC: 9.4 MG/DL (ref 8.7–10.5)
CHLORIDE SERPL-SCNC: 100 MMOL/L (ref 95–110)
CO2 SERPL-SCNC: 28 MMOL/L (ref 23–29)
CREAT SERPL-MCNC: 2 MG/DL (ref 0.5–1.4)
EST. GFR  (AFRICAN AMERICAN): 42.2 ML/MIN/1.73 M^2
EST. GFR  (NON AFRICAN AMERICAN): 36.5 ML/MIN/1.73 M^2
GLUCOSE SERPL-MCNC: 266 MG/DL (ref 70–110)
POTASSIUM SERPL-SCNC: 3.7 MMOL/L (ref 3.5–5.1)
PROT SERPL-MCNC: 8.1 G/DL (ref 6–8.4)
SODIUM SERPL-SCNC: 138 MMOL/L (ref 136–145)

## 2019-12-02 PROCEDURE — 36415 COLL VENOUS BLD VENIPUNCTURE: CPT

## 2019-12-02 PROCEDURE — 83880 ASSAY OF NATRIURETIC PEPTIDE: CPT

## 2019-12-02 PROCEDURE — 99999 PR PBB SHADOW E&M-EST. PATIENT-LVL III: ICD-10-PCS | Mod: PBBFAC,,, | Performed by: INTERNAL MEDICINE

## 2019-12-02 PROCEDURE — 99213 OFFICE O/P EST LOW 20 MIN: CPT | Mod: PBBFAC | Performed by: INTERNAL MEDICINE

## 2019-12-02 PROCEDURE — 99214 OFFICE O/P EST MOD 30 MIN: CPT | Mod: S$PBB,,, | Performed by: INTERNAL MEDICINE

## 2019-12-02 PROCEDURE — 99999 PR PBB SHADOW E&M-EST. PATIENT-LVL III: CPT | Mod: PBBFAC,,, | Performed by: INTERNAL MEDICINE

## 2019-12-02 PROCEDURE — 99214 PR OFFICE/OUTPT VISIT, EST, LEVL IV, 30-39 MIN: ICD-10-PCS | Mod: S$PBB,,, | Performed by: INTERNAL MEDICINE

## 2019-12-02 PROCEDURE — 80053 COMPREHEN METABOLIC PANEL: CPT

## 2019-12-02 NOTE — LETTER
December 2, 2019        Juan C West  [address] [address]  Phone: [phone]  Fax: [phone]             [facility] [address]  [facility] [address]  Phone: [phone]   Patient: Mick Monroe Jr.   MR Number: [id number]   YOB: 1965   Date of Visit: 12/2/2019       Dear Dr. Juan C West    Thank you for referring Mick Monroe to me for evaluation. Attached you will find relevant portions of my assessment and plan of care.    If you have questions, please do not hesitate to call me. I look forward to following Mick Monroe along with you.    Sincerely,    Jai Lau MD    Enclosure    If you would like to receive this communication electronically, please contact [email or web address] or [phone] to request [employer] Link access.    [employer] Link is a tool which provides read-only access to select patient information with whom you have a relationship. Its easy to use and provides real time access to review your patients record including encounter summaries, notes, results, and demographic information.    If you feel you have received this communication in error or would no longer like to receive these types of communications, please e-mail [email or web address]

## 2019-12-02 NOTE — PROGRESS NOTES
Subjective:   Initial evaluation of heart transplant candidacy.    HPI:  Mr. Monroe is a 54 y.o. year old  male who has presented to be considered for advanced surgical options (LVAD/OHT).     He comes from [place]. History of CAD with severe three vessel disease,  Last 2D echo in 6/2017 with EF 25%. He also has a history of essential hypertension and DM type II not on insulin, with CKD stage III (creatinine 2.0).Doing well at this time denies any heart failure symptoms. Reports adherence with his HF medications.     KONG FC II NYHA . Underwent placement of ICD 8/2017 and since then has been on disability.     No orthopnea or PND, he is complaint with low salt diet. Medication complaint as well.     Needs a cyst removal        CONCLUSIONS     1 - Upper limit of normal left ventricular enlargement.     2 - Moderately depressed left ventricular systolic function (EF upper 30s to low 40s).     3 - Impaired LV relaxation, normal LAP (grade 1 diastolic dysfunction).     4 - Right ventricular enlargement with mildly depressed systolic function.     5 - Trivial aortic regurgitation.     6 - Mild tricuspid regurgitation.     7 - Trivial pulmonic regurgitation.     8 - The estimated PA systolic pressure is 25 mmHg.     Past Medical History:   Diagnosis Date    Arthritis     CHF (congestive heart failure)     Chronic renal disease, stage IV     Coronary atherosclerosis of unspecified type of vessel, native or graft     Diabetes mellitus     Hypertension      Past Surgical History:   Procedure Laterality Date    APPENDECTOMY         Family History   Problem Relation Age of Onset    Cancer Mother     Diabetes Father     Cataracts Father     Cataracts Maternal Aunt     Blindness Maternal Uncle     Amblyopia Neg Hx     Glaucoma Neg Hx     Retinal detachment Neg Hx     Macular degeneration Neg Hx     Strabismus Neg Hx        Review of Systems   Constitution: Negative for chills, decreased appetite,  diaphoresis, fever, malaise/fatigue, night sweats, weight gain and weight loss.   HENT: Negative for hearing loss and sore throat.    Eyes: Negative for photophobia, vision loss in left eye and vision loss in right eye.   Cardiovascular: Negative for chest pain, claudication, cyanosis, dyspnea on exertion, irregular heartbeat, leg swelling, near-syncope, orthopnea and palpitations.   Respiratory: Negative for cough, hemoptysis, shortness of breath, sleep disturbances due to breathing, snoring, sputum production and wheezing.    Skin: Negative for color change, dry skin and rash.   Gastrointestinal: Negative for abdominal pain and diarrhea.   Genitourinary: Negative for hematuria.   Neurological: Negative for headaches, light-headedness and weakness.   Psychiatric/Behavioral: Negative for depression.       Objective:  Vitals:    12/02/19 0831   BP: (!) 145/95   Pulse: 72        Physical Exam   Constitutional: He is oriented to person, place, and time. He appears well-developed and well-nourished.   HENT:   Head: Normocephalic and atraumatic.   Eyes: Pupils are equal, round, and reactive to light. Conjunctivae and EOM are normal.   Neck: Normal range of motion. Neck supple. No JVD present.   Cardiovascular: Normal rate and regular rhythm. Exam reveals no gallop and no friction rub.   No murmur heard.  Pulmonary/Chest: Breath sounds normal. No respiratory distress. He has no wheezes. He has no rales. He exhibits no tenderness.   Abdominal: Soft. Bowel sounds are normal. He exhibits no distension and no mass. There is no hepatosplenomegaly, splenomegaly or hepatomegaly. There is no tenderness. There is no rebound, no guarding and no CVA tenderness.   No hepatoslenomegaly   Musculoskeletal: Normal range of motion. He exhibits no edema or tenderness.   Neurological: He is alert and oriented to person, place, and time. He has normal reflexes. No cranial nerve deficit. He exhibits normal muscle tone. Coordination normal.    Skin: Skin is warm and dry.   Psychiatric: He has a normal mood and affect.       Labs:      Chemistry        Component Value Date/Time     (L) 06/05/2019 0812     (L) 06/05/2019 0812    K 4.0 06/05/2019 0812    K 4.0 06/05/2019 0812     06/05/2019 0812     06/05/2019 0812    CO2 27 06/05/2019 0812    CO2 27 06/05/2019 0812    BUN 35 (H) 06/05/2019 0812    BUN 35 (H) 06/05/2019 0812    CREATININE 2.5 (H) 06/05/2019 0812    CREATININE 2.5 (H) 06/05/2019 0812     (H) 06/05/2019 0812     (H) 06/05/2019 0812        Component Value Date/Time    CALCIUM 9.8 06/05/2019 0812    CALCIUM 9.8 06/05/2019 0812    ALKPHOS 91 06/05/2019 0812    AST 15 06/05/2019 0812    ALT 14 06/05/2019 0812    BILITOT 0.4 06/05/2019 0812          No results found for: MG  Lab Results   Component Value Date    WBC 5.69 06/05/2019    HGB 12.9 (L) 06/05/2019    HCT 39.4 (L) 06/05/2019    MCV 86 06/05/2019     06/05/2019     BNP   Date Value Ref Range Status   12/05/2018 18 0 - 99 pg/mL Final     Comment:     Values of less than 100 pg/ml are consistent with non-CHF populations.   06/26/2018 48 0 - 99 pg/mL Final     Comment:     Values of less than 100 pg/ml are consistent with non-CHF populations.   12/19/2017 61 0 - 99 pg/mL Final     Comment:     Values of less than 100 pg/ml are consistent with non-CHF populations.     No results found for this or any previous visit.    Labs were reviewed with the patient.    Assessment:      1. Ischemic dilated cardiomyopathy    2. ICD (implantable cardioverter-defibrillator), single, in situ    3. Essential hypertension    4. Chronic systolic heart failure    5. Coronary artery disease involving native coronary artery of native heart without angina pectoris    6. Ischemic cardiomyopathy    7. Chronic kidney disease, stage 4, severely decreased GFR        Plan:   S/p ICD placement in 8/2017. Continue on HF medications. Stable    He is medium risk for cyst removal  from cardiac standpoint    · Heart Failure Stage ACC/AHA stage C  · Functional Class NYHA II  · Recommend 2 gram sodium restriction and 1500 cc fluid restriction.  · Encourage physical activity with graded exercise program.  · Requested patient to weigh themselves daily, and to notify us if their weight increases by more than 3 lbs in 1 day or 5 lbs in 1 week.       Transplant Candidacy: Patient is a 54 y.o. year old male with heart failure is being seen for possible LVAD and OHT. In my opinion, he is  a suitable LVAD and OHT candidate..     UNOS Patient Status  Functional Status: 90% - Able to carry on normal activity: minor symptoms of disease  Physical Capacity: Limited Mobility  Working for Income: No  If no, reason not working: Disability     RTC 6 months

## 2019-12-02 NOTE — PROGRESS NOTES
Subjective:   Initial evaluation of heart transplant candidacy.    HPI:  Mr. Monroe is a 54 y.o. year old  male who has presented to be considered for advanced surgical options (LVAD/OHT).     He comes from [place]. History of CAD with severe three vessel disease,  Last 2D echo in 6/2017 with EF 25%. He also has a history of essential hypertension and DM type II not on insulin, with CKD stage III (creatinine 2.0).Doing well at this time denies any heart failure symptoms. Reports adherence with his HF medications.     KONG FC II NYHA . Underwent placement of ICD 8/2017 and since then has been on disability.     No orthopnea or PND, he is complaint with low salt diet. Medication complaint as well.       Some fluid retention in lower extermities        CONCLUSIONS     1 - Upper limit of normal left ventricular enlargement.     2 - Moderately depressed left ventricular systolic function (EF upper 30s to low 40s).     3 - Impaired LV relaxation, normal LAP (grade 1 diastolic dysfunction).     4 - Right ventricular enlargement with mildly depressed systolic function.     5 - Trivial aortic regurgitation.     6 - Mild tricuspid regurgitation.     7 - Trivial pulmonic regurgitation.     8 - The estimated PA systolic pressure is 25 mmHg.     Past Medical History:   Diagnosis Date    Arthritis     CHF (congestive heart failure)     Chronic renal disease, stage IV     Coronary atherosclerosis of unspecified type of vessel, native or graft     Diabetes mellitus     Hypertension      Past Surgical History:   Procedure Laterality Date    APPENDECTOMY         Family History   Problem Relation Age of Onset    Cancer Mother     Diabetes Father     Cataracts Father     Cataracts Maternal Aunt     Blindness Maternal Uncle     Amblyopia Neg Hx     Glaucoma Neg Hx     Retinal detachment Neg Hx     Macular degeneration Neg Hx     Strabismus Neg Hx        Review of Systems   Constitution: Negative for  chills, decreased appetite, diaphoresis, fever, malaise/fatigue, night sweats, weight gain and weight loss.   HENT: Negative for hearing loss and sore throat.    Eyes: Negative for photophobia, vision loss in left eye and vision loss in right eye.   Cardiovascular: Negative for chest pain, claudication, cyanosis, dyspnea on exertion, irregular heartbeat, leg swelling, near-syncope, orthopnea and palpitations.   Respiratory: Negative for cough, hemoptysis, shortness of breath, sleep disturbances due to breathing, snoring, sputum production and wheezing.    Skin: Negative for color change, dry skin and rash.   Gastrointestinal: Negative for abdominal pain and diarrhea.   Genitourinary: Negative for hematuria.   Neurological: Negative for headaches, light-headedness and weakness.   Psychiatric/Behavioral: Negative for depression.       Objective:  Vitals:    12/02/19 0831   BP: (!) 145/95   Pulse: 72        Physical Exam   Constitutional: He is oriented to person, place, and time. He appears well-developed and well-nourished.   HENT:   Head: Normocephalic and atraumatic.   Eyes: Pupils are equal, round, and reactive to light. Conjunctivae and EOM are normal.   Neck: Normal range of motion. Neck supple. No JVD present.   Cardiovascular: Normal rate and regular rhythm. Exam reveals no gallop and no friction rub.   No murmur heard.  Pulmonary/Chest: Breath sounds normal. No respiratory distress. He has no wheezes. He has no rales. He exhibits no tenderness.   Abdominal: Soft. Bowel sounds are normal. He exhibits no distension and no mass. There is no hepatosplenomegaly, splenomegaly or hepatomegaly. There is no tenderness. There is no rebound, no guarding and no CVA tenderness.   No hepatoslenomegaly   Musculoskeletal: Normal range of motion. He exhibits no edema or tenderness.   Neurological: He is alert and oriented to person, place, and time. He has normal reflexes. No cranial nerve deficit. He exhibits normal muscle  tone. Coordination normal.   Skin: Skin is warm and dry.   Psychiatric: He has a normal mood and affect.       Labs:      Chemistry        Component Value Date/Time     (L) 06/05/2019 0812     (L) 06/05/2019 0812    K 4.0 06/05/2019 0812    K 4.0 06/05/2019 0812     06/05/2019 0812     06/05/2019 0812    CO2 27 06/05/2019 0812    CO2 27 06/05/2019 0812    BUN 35 (H) 06/05/2019 0812    BUN 35 (H) 06/05/2019 0812    CREATININE 2.5 (H) 06/05/2019 0812    CREATININE 2.5 (H) 06/05/2019 0812     (H) 06/05/2019 0812     (H) 06/05/2019 0812        Component Value Date/Time    CALCIUM 9.8 06/05/2019 0812    CALCIUM 9.8 06/05/2019 0812    ALKPHOS 91 06/05/2019 0812    AST 15 06/05/2019 0812    ALT 14 06/05/2019 0812    BILITOT 0.4 06/05/2019 0812          No results found for: MG  Lab Results   Component Value Date    WBC 5.69 06/05/2019    HGB 12.9 (L) 06/05/2019    HCT 39.4 (L) 06/05/2019    MCV 86 06/05/2019     06/05/2019     BNP   Date Value Ref Range Status   12/05/2018 18 0 - 99 pg/mL Final     Comment:     Values of less than 100 pg/ml are consistent with non-CHF populations.   06/26/2018 48 0 - 99 pg/mL Final     Comment:     Values of less than 100 pg/ml are consistent with non-CHF populations.   12/19/2017 61 0 - 99 pg/mL Final     Comment:     Values of less than 100 pg/ml are consistent with non-CHF populations.     No results found for this or any previous visit.    Labs were reviewed with the patient.    Assessment:      1. Ischemic dilated cardiomyopathy    2. ICD (implantable cardioverter-defibrillator), single, in situ    3. Essential hypertension    4. Chronic systolic heart failure    5. Coronary artery disease involving native coronary artery of native heart without angina pectoris    6. Ischemic cardiomyopathy    7. Chronic kidney disease, stage 4, severely decreased GFR        Plan:   S/p ICD placement in 8/2017. Continue on HF medications.  Stable      · Heart Failure Stage ACC/AHA stage C  · Functional Class NYHA II  · Recommend 2 gram sodium restriction and 1500 cc fluid restriction.  · Encourage physical activity with graded exercise program.  · Requested patient to weigh themselves daily, and to notify us if their weight increases by more than 3 lbs in 1 day or 5 lbs in 1 week.       Transplant Candidacy: Patient is a 54 y.o. year old male with heart failure is being seen for possible LVAD and OHT. In my opinion, he is  a suitable LVAD and OHT candidate..     UNOS Patient Status  Functional Status: 90% - Able to carry on normal activity: minor symptoms of disease  Physical Capacity: Limited Mobility  Working for Income: No  If no, reason not working: Disability     RTC 6 months

## 2020-01-13 ENCOUNTER — CLINICAL SUPPORT (OUTPATIENT)
Dept: CARDIOLOGY | Facility: HOSPITAL | Age: 55
End: 2020-01-13
Payer: MEDICARE

## 2020-01-13 DIAGNOSIS — Z95.810 PRESENCE OF AUTOMATIC (IMPLANTABLE) CARDIAC DEFIBRILLATOR: ICD-10-CM

## 2020-01-13 PROCEDURE — 93296 REM INTERROG EVL PM/IDS: CPT | Performed by: INTERNAL MEDICINE

## 2020-01-13 PROCEDURE — 93295 CARDIAC DEVICE CHECK - REMOTE: ICD-10-PCS | Mod: ,,, | Performed by: INTERNAL MEDICINE

## 2020-01-13 PROCEDURE — 93295 DEV INTERROG REMOTE 1/2/MLT: CPT | Mod: ,,, | Performed by: INTERNAL MEDICINE

## 2020-02-12 DIAGNOSIS — I50.42 CHRONIC COMBINED SYSTOLIC AND DIASTOLIC HEART FAILURE: ICD-10-CM

## 2020-02-12 RX ORDER — ISOSORBIDE MONONITRATE 30 MG/1
TABLET, EXTENDED RELEASE ORAL
Qty: 90 TABLET | Refills: 1 | Status: SHIPPED | OUTPATIENT
Start: 2020-02-12 | End: 2020-08-17 | Stop reason: SDUPTHER

## 2020-02-23 DIAGNOSIS — I50.42 CHRONIC COMBINED SYSTOLIC AND DIASTOLIC HEART FAILURE: ICD-10-CM

## 2020-02-23 RX ORDER — SACUBITRIL AND VALSARTAN 24; 26 MG/1; MG/1
TABLET, FILM COATED ORAL
Qty: 90 TABLET | Refills: 1 | Status: SHIPPED | OUTPATIENT
Start: 2020-02-23 | End: 2020-06-21

## 2020-03-03 ENCOUNTER — TELEPHONE (OUTPATIENT)
Dept: CARDIOLOGY | Facility: HOSPITAL | Age: 55
End: 2020-03-03

## 2020-03-03 NOTE — TELEPHONE ENCOUNTER
----- Message from Lauren Camarillo MA sent at 3/2/2020  3:15 PM CST -----  Contact: patient call  The patient would like to talk to someone about his device. Please call [phone] . Thank you.

## 2020-04-12 ENCOUNTER — CLINICAL SUPPORT (OUTPATIENT)
Dept: CARDIOLOGY | Facility: HOSPITAL | Age: 55
End: 2020-04-12
Payer: MEDICARE

## 2020-04-12 DIAGNOSIS — Z95.810 PRESENCE OF AUTOMATIC (IMPLANTABLE) CARDIAC DEFIBRILLATOR: ICD-10-CM

## 2020-04-12 PROCEDURE — 93295 CARDIAC DEVICE CHECK - REMOTE: ICD-10-PCS | Mod: ,,, | Performed by: INTERNAL MEDICINE

## 2020-04-12 PROCEDURE — 93296 REM INTERROG EVL PM/IDS: CPT | Performed by: INTERNAL MEDICINE

## 2020-04-12 PROCEDURE — 93295 DEV INTERROG REMOTE 1/2/MLT: CPT | Mod: ,,, | Performed by: INTERNAL MEDICINE

## 2020-05-14 ENCOUNTER — TELEPHONE (OUTPATIENT)
Dept: TRANSPLANT | Facility: CLINIC | Age: 55
End: 2020-05-14

## 2020-05-14 NOTE — TELEPHONE ENCOUNTER
Called pt regarding his appointment with Dr. Lau on 5/22/20. No answer. Left voicemail for patient  with call back information where I can be reached if needed.

## 2020-05-15 ENCOUNTER — TELEPHONE (OUTPATIENT)
Dept: TRANSPLANT | Facility: CLINIC | Age: 55
End: 2020-05-15

## 2020-05-15 NOTE — TELEPHONE ENCOUNTER
Called pt regarding appointment with Dr. Lau on 5/22/20. No answer. Left voicemail for patient  with call back information where I can be reached if needed.

## 2020-06-08 ENCOUNTER — OFFICE VISIT (OUTPATIENT)
Dept: TRANSPLANT | Facility: CLINIC | Age: 55
End: 2020-06-08
Payer: MEDICARE

## 2020-06-08 VITALS
HEART RATE: 82 BPM | SYSTOLIC BLOOD PRESSURE: 145 MMHG | HEIGHT: 65 IN | WEIGHT: 197.31 LBS | BODY MASS INDEX: 32.87 KG/M2 | DIASTOLIC BLOOD PRESSURE: 99 MMHG

## 2020-06-08 DIAGNOSIS — I42.0 ISCHEMIC DILATED CARDIOMYOPATHY: ICD-10-CM

## 2020-06-08 DIAGNOSIS — I50.22 CHRONIC SYSTOLIC HEART FAILURE: Primary | ICD-10-CM

## 2020-06-08 DIAGNOSIS — I25.10 CORONARY ARTERY DISEASE INVOLVING NATIVE CORONARY ARTERY OF NATIVE HEART WITHOUT ANGINA PECTORIS: ICD-10-CM

## 2020-06-08 DIAGNOSIS — N18.4 CHRONIC KIDNEY DISEASE, STAGE 4, SEVERELY DECREASED GFR: ICD-10-CM

## 2020-06-08 DIAGNOSIS — I10 ESSENTIAL HYPERTENSION: ICD-10-CM

## 2020-06-08 DIAGNOSIS — I25.5 ISCHEMIC DILATED CARDIOMYOPATHY: ICD-10-CM

## 2020-06-08 DIAGNOSIS — Z95.810 ICD (IMPLANTABLE CARDIOVERTER-DEFIBRILLATOR), SINGLE, IN SITU: ICD-10-CM

## 2020-06-08 PROCEDURE — 99999 PR PBB SHADOW E&M-EST. PATIENT-LVL III: ICD-10-PCS | Mod: PBBFAC,,, | Performed by: INTERNAL MEDICINE

## 2020-06-08 PROCEDURE — 99215 PR OFFICE/OUTPT VISIT, EST, LEVL V, 40-54 MIN: ICD-10-PCS | Mod: S$GLB,,, | Performed by: INTERNAL MEDICINE

## 2020-06-08 PROCEDURE — 99215 OFFICE O/P EST HI 40 MIN: CPT | Mod: S$GLB,,, | Performed by: INTERNAL MEDICINE

## 2020-06-08 PROCEDURE — 99999 PR PBB SHADOW E&M-EST. PATIENT-LVL III: CPT | Mod: PBBFAC,,, | Performed by: INTERNAL MEDICINE

## 2020-06-08 NOTE — LETTER
June 8, 2020        Juan C West  [address] [address]  Phone: [phone]  Fax: [phone]             [facility] [address]  [facility] [address]  Phone: [phone]   Patient: Mick Monroe Jr.   MR Number: [id number]   YOB: 1965   Date of Visit: 6/8/2020       Dear Dr. Juan C West    Thank you for referring Mick Monroe to me for evaluation. Attached you will find relevant portions of my assessment and plan of care.    If you have questions, please do not hesitate to call me. I look forward to following Mick Monroe along with you.    Sincerely,    Jai Lau MD    Enclosure    If you would like to receive this communication electronically, please contact [email or web address] or [phone] to request [employer] Link access.    [employer] Link is a tool which provides read-only access to select patient information with whom you have a relationship. Its easy to use and provides real time access to review your patients record including encounter summaries, notes, results, and demographic information.    If you feel you have received this communication in error or would no longer like to receive these types of communications, please e-mail [email or web address]

## 2020-06-08 NOTE — PROGRESS NOTES
Subjective:   Initial evaluation of heart transplant candidacy.    HPI:  Mr. Monroe is a 55 y.o. year old  male who has presented to be considered for advanced surgical options (LVAD/OHT).     He comes from [place]. History of CAD with severe three vessel disease,  Last 2D echo in 6/2017 with EF 25%. He also has a history of essential hypertension and DM type II not on insulin, with CKD stage III (creatinine 2.0).Doing well at this time denies any heart failure symptoms. Reports adherence with his HF medications.     KONG FC II NYHA . Underwent placement of ICD 8/2017 and since then has been on disability.     No orthopnea or PND, he is complaint with low salt diet. Medication complaint as well.       Doing very well        CONCLUSIONS     1 - Upper limit of normal left ventricular enlargement.     2 - Moderately depressed left ventricular systolic function (EF upper 30s to low 40s).     3 - Impaired LV relaxation, normal LAP (grade 1 diastolic dysfunction).     4 - Right ventricular enlargement with mildly depressed systolic function.     5 - Trivial aortic regurgitation.     6 - Mild tricuspid regurgitation.     7 - Trivial pulmonic regurgitation.     8 - The estimated PA systolic pressure is 25 mmHg.     Past Medical History:   Diagnosis Date    Arthritis     CHF (congestive heart failure)     Chronic renal disease, stage IV     Coronary atherosclerosis of unspecified type of vessel, native or graft     Diabetes mellitus     Hypertension      Past Surgical History:   Procedure Laterality Date    APPENDECTOMY         Family History   Problem Relation Age of Onset    Cancer Mother     Diabetes Father     Cataracts Father     Cataracts Maternal Aunt     Blindness Maternal Uncle     Amblyopia Neg Hx     Glaucoma Neg Hx     Retinal detachment Neg Hx     Macular degeneration Neg Hx     Strabismus Neg Hx        Review of Systems   Constitution: Negative for chills, decreased appetite,  diaphoresis, fever, malaise/fatigue, night sweats, weight gain and weight loss.   HENT: Negative for hearing loss and sore throat.    Eyes: Negative for photophobia, vision loss in left eye and vision loss in right eye.   Cardiovascular: Negative for chest pain, claudication, cyanosis, dyspnea on exertion, irregular heartbeat, leg swelling, near-syncope, orthopnea and palpitations.   Respiratory: Negative for cough, hemoptysis, shortness of breath, sleep disturbances due to breathing, snoring, sputum production and wheezing.    Skin: Negative for color change, dry skin and rash.   Gastrointestinal: Negative for abdominal pain and diarrhea.   Genitourinary: Negative for hematuria.   Neurological: Negative for headaches, light-headedness and weakness.   Psychiatric/Behavioral: Negative for depression.       Objective:  Vitals:    06/08/20 0845   BP: (!) 145/99   Pulse: 82        Physical Exam   Constitutional: He is oriented to person, place, and time. He appears well-developed and well-nourished.   HENT:   Head: Normocephalic and atraumatic.   Eyes: Pupils are equal, round, and reactive to light. Conjunctivae and EOM are normal.   Neck: Normal range of motion. Neck supple. No JVD present.   Cardiovascular: Normal rate and regular rhythm. Exam reveals no gallop and no friction rub.   No murmur heard.  Pulmonary/Chest: Breath sounds normal. No respiratory distress. He has no wheezes. He has no rales. He exhibits no tenderness.   Abdominal: Soft. Bowel sounds are normal. He exhibits no distension and no mass. There is no hepatosplenomegaly, splenomegaly or hepatomegaly. There is no tenderness. There is no rebound, no guarding and no CVA tenderness.   No hepatoslenomegaly   Musculoskeletal: Normal range of motion. He exhibits no edema or tenderness.   Neurological: He is alert and oriented to person, place, and time. He has normal reflexes. No cranial nerve deficit. He exhibits normal muscle tone. Coordination normal.    Skin: Skin is warm and dry.   Psychiatric: He has a normal mood and affect.       Labs:      Chemistry        Component Value Date/Time     (L) 02/28/2020 1552     (L) 09/25/2019 1545    K 4.0 02/28/2020 1552    K 4.5 09/25/2019 1545     02/28/2020 1552    CO2 30 (H) 02/28/2020 1552    CO2 28 09/25/2019 1545    BUN 25 (H) 02/28/2020 1552    CREATININE 1.9 (H) 02/28/2020 1552    CREATININE 2.50 (H) 09/25/2019 1545     (H) 02/28/2020 1552        Component Value Date/Time    CALCIUM 8.9 02/28/2020 1552    ALKPHOS 81 02/28/2020 1552    AST 9 (L) 02/28/2020 1552    ALT 15 02/28/2020 1552    BILITOT 0.4 02/28/2020 1552          No results found for: MG  Lab Results   Component Value Date    WBC 4.98 02/28/2020    HGB 12.7 (L) 02/28/2020    HCT 38.9 (L) 02/28/2020    MCV 88 02/28/2020     02/28/2020     BNP   Date Value Ref Range Status   12/02/2019 38 0 - 99 pg/mL Final     Comment:     Values of less than 100 pg/ml are consistent with non-CHF populations.   12/05/2018 18 0 - 99 pg/mL Final     Comment:     Values of less than 100 pg/ml are consistent with non-CHF populations.   06/26/2018 48 0 - 99 pg/mL Final     Comment:     Values of less than 100 pg/ml are consistent with non-CHF populations.     No results found for this or any previous visit.    Labs were reviewed with the patient.    Assessment:      1. Chronic systolic heart failure    2. Coronary artery disease involving native coronary artery of native heart without angina pectoris    3. Essential hypertension    4. ICD (implantable cardioverter-defibrillator), single, in situ    5. Ischemic dilated cardiomyopathy    6. Chronic kidney disease, stage 4, severely decreased GFR        Plan:   HFrEF Continue on HF medications. Stable      · Heart Failure Stage ACC/AHA stage C  · Functional Class NYHA II  · Recommend 2 gram sodium restriction and 1500 cc fluid restriction.  · Encourage physical activity with graded exercise  program.  · Requested patient to weigh themselves daily, and to notify us if their weight increases by more than 3 lbs in 1 day or 5 lbs in 1 week.       Transplant Candidacy: Patient is a 55 y.o. year old male with heart failure is being seen for possible LVAD and OHT. In my opinion, he is  a suitable LVAD and OHT candidate..     UNOS Patient Status  Functional Status: 90% - Able to carry on normal activity: minor symptoms of disease  Physical Capacity: Limited Mobility  Working for Income: No  If no, reason not working: Disability     RTC 6 months

## 2020-07-11 ENCOUNTER — CLINICAL SUPPORT (OUTPATIENT)
Dept: CARDIOLOGY | Facility: HOSPITAL | Age: 55
End: 2020-07-11
Payer: MEDICARE

## 2020-07-11 DIAGNOSIS — I47.20 VENTRICULAR TACHYCARDIA: ICD-10-CM

## 2020-07-11 DIAGNOSIS — I50.42 CHRONIC COMBINED SYSTOLIC (CONGESTIVE) AND DIASTOLIC (CONGESTIVE) HEART FAILURE: ICD-10-CM

## 2020-07-11 DIAGNOSIS — Z95.810 PRESENCE OF AUTOMATIC (IMPLANTABLE) CARDIAC DEFIBRILLATOR: ICD-10-CM

## 2020-07-11 PROCEDURE — 93295 DEV INTERROG REMOTE 1/2/MLT: CPT | Mod: ,,, | Performed by: INTERNAL MEDICINE

## 2020-07-11 PROCEDURE — 93295 CARDIAC DEVICE CHECK - REMOTE: ICD-10-PCS | Mod: ,,, | Performed by: INTERNAL MEDICINE

## 2020-07-11 PROCEDURE — 93296 REM INTERROG EVL PM/IDS: CPT | Performed by: INTERNAL MEDICINE

## 2020-08-14 ENCOUNTER — TELEPHONE (OUTPATIENT)
Dept: TRANSPLANT | Facility: CLINIC | Age: 55
End: 2020-08-14

## 2020-09-30 ENCOUNTER — TELEPHONE (OUTPATIENT)
Dept: ELECTROPHYSIOLOGY | Facility: CLINIC | Age: 55
End: 2020-09-30

## 2020-09-30 DIAGNOSIS — I49.8 OTHER SPECIFIED CARDIAC ARRHYTHMIAS: Primary | ICD-10-CM

## 2020-09-30 DIAGNOSIS — I49.9 ARRHYTHMIA: ICD-10-CM

## 2020-09-30 NOTE — TELEPHONE ENCOUNTER
L/m for pt with appt times. Will mail appt reminder.    ----- Message from Kristyn Abad MA sent at 9/30/2020  2:12 PM CDT -----  Regarding: FW: Follow up    ----- Message -----  From: Tracee Johnson RN  Sent: 9/14/2020   9:14 AM CDT  To: [facility] Staff  Subject: Follow up                                        Patient due for follow up in clinic. He has a device and will need a device check coordinated with his physician appt. Please call patient and schedule accordingly.    Thank you!  Brayan

## 2020-10-09 ENCOUNTER — CLINICAL SUPPORT (OUTPATIENT)
Dept: CARDIOLOGY | Facility: HOSPITAL | Age: 55
End: 2020-10-09
Payer: MEDICARE

## 2020-10-09 DIAGNOSIS — Z95.810 PRESENCE OF AUTOMATIC (IMPLANTABLE) CARDIAC DEFIBRILLATOR: ICD-10-CM

## 2020-10-09 DIAGNOSIS — I25.5 ISCHEMIC CARDIOMYOPATHY: ICD-10-CM

## 2020-10-09 PROCEDURE — 93296 REM INTERROG EVL PM/IDS: CPT | Performed by: INTERNAL MEDICINE

## 2020-10-09 PROCEDURE — 93295 DEV INTERROG REMOTE 1/2/MLT: CPT | Mod: ,,, | Performed by: INTERNAL MEDICINE

## 2020-10-09 PROCEDURE — 93295 CARDIAC DEVICE CHECK - REMOTE: ICD-10-PCS | Mod: ,,, | Performed by: INTERNAL MEDICINE

## 2020-10-22 ENCOUNTER — CLINICAL SUPPORT (OUTPATIENT)
Dept: CARDIOLOGY | Facility: HOSPITAL | Age: 55
End: 2020-10-22
Attending: INTERNAL MEDICINE
Payer: MEDICARE

## 2020-10-22 ENCOUNTER — OFFICE VISIT (OUTPATIENT)
Dept: ELECTROPHYSIOLOGY | Facility: CLINIC | Age: 55
End: 2020-10-22
Attending: INTERNAL MEDICINE
Payer: MEDICARE

## 2020-10-22 ENCOUNTER — HOSPITAL ENCOUNTER (OUTPATIENT)
Dept: CARDIOLOGY | Facility: CLINIC | Age: 55
Discharge: HOME | End: 2020-10-22
Attending: INTERNAL MEDICINE
Payer: MEDICARE

## 2020-10-22 VITALS
HEART RATE: 73 BPM | SYSTOLIC BLOOD PRESSURE: 128 MMHG | DIASTOLIC BLOOD PRESSURE: 88 MMHG | WEIGHT: 187.19 LBS | BODY MASS INDEX: 31.19 KG/M2 | HEIGHT: 65 IN

## 2020-10-22 DIAGNOSIS — I49.8 OTHER SPECIFIED CARDIAC ARRHYTHMIAS: ICD-10-CM

## 2020-10-22 DIAGNOSIS — I25.5 ISCHEMIC CARDIOMYOPATHY: ICD-10-CM

## 2020-10-22 DIAGNOSIS — I10 ESSENTIAL HYPERTENSION: ICD-10-CM

## 2020-10-22 DIAGNOSIS — Z95.810 ICD (IMPLANTABLE CARDIOVERTER-DEFIBRILLATOR), SINGLE, IN SITU: Primary | ICD-10-CM

## 2020-10-22 DIAGNOSIS — I49.9 ARRHYTHMIA: ICD-10-CM

## 2020-10-22 DIAGNOSIS — I42.0 ISCHEMIC DILATED CARDIOMYOPATHY: ICD-10-CM

## 2020-10-22 DIAGNOSIS — I25.5 ISCHEMIC DILATED CARDIOMYOPATHY: ICD-10-CM

## 2020-10-22 PROCEDURE — 93005 RHYTHM STRIP: ICD-10-PCS | Mod: 59,S$GLB,, | Performed by: INTERNAL MEDICINE

## 2020-10-22 PROCEDURE — 99999 PR PBB SHADOW E&M-EST. PATIENT-LVL IV: CPT | Mod: PBBFAC,,, | Performed by: NURSE PRACTITIONER

## 2020-10-22 PROCEDURE — 99214 OFFICE O/P EST MOD 30 MIN: CPT | Mod: S$GLB,,, | Performed by: NURSE PRACTITIONER

## 2020-10-22 PROCEDURE — 99999 PR PBB SHADOW E&M-EST. PATIENT-LVL IV: ICD-10-PCS | Mod: PBBFAC,,, | Performed by: NURSE PRACTITIONER

## 2020-10-22 PROCEDURE — 93005 ELECTROCARDIOGRAM TRACING: CPT | Mod: 59,S$GLB,, | Performed by: INTERNAL MEDICINE

## 2020-10-22 PROCEDURE — 99214 PR OFFICE/OUTPT VISIT, EST, LEVL IV, 30-39 MIN: ICD-10-PCS | Mod: S$GLB,,, | Performed by: NURSE PRACTITIONER

## 2020-10-22 PROCEDURE — 93010 ELECTROCARDIOGRAM REPORT: CPT | Mod: S$GLB,,, | Performed by: INTERNAL MEDICINE

## 2020-10-22 PROCEDURE — 93282 PRGRMG EVAL IMPLANTABLE DFB: CPT

## 2020-10-22 PROCEDURE — 93282 CARDIAC DEVICE CHECK - IN CLINIC & HOSPITAL: ICD-10-PCS | Mod: 26,,, | Performed by: INTERNAL MEDICINE

## 2020-10-22 PROCEDURE — 93010 RHYTHM STRIP: ICD-10-PCS | Mod: S$GLB,,, | Performed by: INTERNAL MEDICINE

## 2020-10-22 PROCEDURE — 93282 PRGRMG EVAL IMPLANTABLE DFB: CPT | Mod: 26,,, | Performed by: INTERNAL MEDICINE

## 2020-10-22 RX ORDER — IBUPROFEN 800 MG/1
TABLET ORAL
COMMUNITY
Start: 2020-09-22 | End: 2021-11-02

## 2020-10-22 RX ORDER — DOXYCYCLINE HYCLATE 100 MG
100 TABLET ORAL 2 TIMES DAILY
COMMUNITY
Start: 2020-08-26 | End: 2021-01-14

## 2020-10-22 RX ORDER — CEPHALEXIN 500 MG/1
CAPSULE ORAL
COMMUNITY
Start: 2020-09-22 | End: 2021-01-14

## 2020-10-22 RX ORDER — PANTOPRAZOLE SODIUM 20 MG/1
20 TABLET, DELAYED RELEASE ORAL DAILY
COMMUNITY
Start: 2020-09-26 | End: 2020-12-21

## 2020-10-22 NOTE — PROGRESS NOTES
Mr. Monroe is a patient of Dr. Cline and was last seen in clinic 6/5/2019.      Subjective:   Patient ID:  Mick Monroe Jr. is a 55 y.o. male who presents for follow-up of Cardiomyopathy  .     HPI:    Mr. Monroe is a 55 y.o. male with ischemic cardiomyopathy, CAD, CHF, HTN, DM, CKD IV here for annual follow up.     Background:    Primary cardiologist is Dr. West.  Also sees Dr. Lau.  Initially presented with CHF and new-onset cardiomyopathy (12/17).    Echo (12/16) EF 10-15%>>LifeVest placed.   LHC (12/21/16) PCI to LAD and RCA, subtotal occlusion of Cx>>placed on optimal medical therapy.  Echo (04/24/17) EF 25-30%  At his initial EP visit, Mr. Monroe reported experiencing dyspnea on exertion c/w NYHA Class II CHF>>denied syncope, palpitations.  He underwent successful SC ICD placement (08/31/17) without complication.   Felt well at clinic visit 6/5/2019.    Update (10/22/2020):    Today he says he feels well. Only complaint is tooth pain and he would like his teeth removed. No cardiac complaints. Mr. Monroe reports no chest pain with exertion or at rest, palpitations, SOB, KONG, dizziness, or syncope.    Device Interrogation (10/22/2020) reveals an intrinsic SR with stable lead and device function. No arrhythmias or treated episodes were noted.  He paces 0% in the RV. Estimated battery longevity 3.1V.    I have personally reviewed the patient's EKG today, which shows sinus rhythm at 73bpm. WY interval is 208. QRS is 94. QTc is 447.    Recent Cardiac Tests:    2D Echo (6/26/2018):  CONCLUSIONS     1 - Upper limit of normal left ventricular enlargement.     2 - Moderately depressed left ventricular systolic function (EF upper 30s to low 40s).     3 - Impaired LV relaxation, normal LAP (grade 1 diastolic dysfunction).     4 - Right ventricular enlargement with mildly depressed systolic function.     5 - Trivial aortic regurgitation.     6 - Mild tricuspid regurgitation.     7 - Trivial pulmonic  "regurgitation.     8 - The estimated PA systolic pressure is 25 mmHg.     Current Outpatient Medications   Medication Sig    aspirin (ECOTRIN) 81 MG EC tablet Take 1 tablet (81 mg total) by mouth Daily.    atorvastatin (LIPITOR) 40 MG tablet Take 1 tablet (40 mg total) by mouth every evening.    blood sugar diagnostic Strp 1 strip by Misc.(Non-Drug; Combo Route) route 2 (two) times a day.    furosemide (LASIX) 40 MG tablet Take 1 tablet (40 mg total) by mouth once daily.    glimepiride (AMARYL) 2 MG tablet Take 1 tablet (2 mg total) by mouth before breakfast.    isosorbide mononitrate (IMDUR) 30 MG 24 hr tablet Take 1 tablet (30 mg total) by mouth once daily.    lancets (ULTRA THIN LANCETS) 30 gauge Misc USE TO CHECK GLUCOSE TWICE DAILY    metaxalone (SKELAXIN) 800 MG tablet Take 1 tablet (800 mg total) by mouth 3 (three) times daily.    metoprolol succinate (TOPROL-XL) 50 MG 24 hr tablet Take 1 tablet (50 mg total) by mouth once daily.    sacubitriL-valsartan (ENTRESTO) 24-26 mg per tablet Take 1 tablet by mouth 2 (two) times daily.    spironolactone (ALDACTONE) 25 MG tablet Take 1 tablet (25 mg total) by mouth once daily.     No current facility-administered medications for this visit.        Review of Systems   Constitution: Negative for malaise/fatigue.   Cardiovascular: Negative for chest pain, dyspnea on exertion, irregular heartbeat, leg swelling and palpitations.   Respiratory: Negative for shortness of breath.    Hematologic/Lymphatic: Negative for bleeding problem.   Skin: Negative for rash.   Musculoskeletal: Negative for myalgias.   Gastrointestinal: Negative for hematemesis, hematochezia and nausea.   Genitourinary: Negative for hematuria.   Neurological: Negative for light-headedness.   Psychiatric/Behavioral: Negative for altered mental status.   Allergic/Immunologic: Negative for persistent infections.     Objective:        /88   Pulse 73   Ht 5' 5" (1.651 m)   Wt 84.9 kg (187 lb " 2.7 oz)   BMI 31.15 kg/m²     Physical Exam   Constitutional: He is oriented to person, place, and time. He appears well-developed and well-nourished.   HENT:   Head: Normocephalic.   Nose: Nose normal.   Eyes: Pupils are equal, round, and reactive to light.   Cardiovascular: Normal rate and regular rhythm.   Pulmonary/Chest: Breath sounds normal. No respiratory distress.   Device to LUCW. Incision and pocket in good repair.   Abdominal: Normal appearance.   Musculoskeletal: Normal range of motion.         General: No edema.   Neurological: He is alert and oriented to person, place, and time.   Skin: Skin is warm and dry. No erythema.   Psychiatric: He has a normal mood and affect. His speech is normal and behavior is normal.   Nursing note and vitals reviewed.    Lab Results   Component Value Date     (L) 08/17/2020     (L) 09/25/2019    K 4.1 08/17/2020    K 4.5 09/25/2019    BUN 24 (H) 08/17/2020    CREATININE 2.2 (H) 08/17/2020    CREATININE 2.50 (H) 09/25/2019    ALT 14 08/17/2020    AST 8 (L) 08/17/2020    HGB 12.7 (L) 02/28/2020    HCT 38.9 (L) 02/28/2020    LDLCALC 147 08/17/2020    LDLCALC 175 (H) 09/25/2019           Assessment:     1. ICD (implantable cardioverter-defibrillator), single, in situ    2. Ischemic dilated cardiomyopathy    3. Essential hypertension    4. Ischemic cardiomyopathy      Plan:     In summary, Mr. Monroe is a 55 y.o. male with ischemic cardiomyopathy, CAD, CHF, HTN, DM, CKD IV here for annual follow up.   Mr. Monroe is doing well from a device perspective with stable lead and device function. No arrhythmia noted. No RV pacing. No CHF symptoms. He is on GDMT. Last EF 6/2018 improved to 40%.    Continue current medication regimen and device settings.   Follow up in device clinic as scheduled.   Follow up in EP clinic in 1 year, sooner as needed.     *A copy of this note has been sent to Dr. Cline*    Follow up in about 1 year (around  10/22/2021).    ------------------------------------------------------------------    NOEMI Chilel, NP-C  Cardiac Electrophysiology

## 2020-10-22 NOTE — LETTER
October 22, 2020      David Cline MD  [address]  [facility] [address]           [facility]  [address]  [facility] [address]  Phone: [phone]  Fax: [phone]          Patient: Mick Monroe Jr.   MR Number: [id number]   YOB: 1965   Date of Visit: 10/22/2020       Dear Dr. David Cline:    Thank you for referring Mick Monroe to me for evaluation. Attached you will find relevant portions of my assessment and plan of care.    If you have questions, please do not hesitate to call me. I look forward to following Mick Monroe along with you.    Sincerely,    Janie Pardo NP    Enclosure  CC:  No Recipients    If you would like to receive this communication electronically, please contact [email or web address] or [phone] to request more information on [employer] access.    For providers and/or their staff who would like to refer a patient to [facility], please contact us through our one-stop-shop provider referral line, [facility], at [phone].    If you feel you have received this communication in error or would no longer like to receive these types of communications, please e-mail [email or web address]

## 2020-12-03 DIAGNOSIS — I50.22 CHRONIC SYSTOLIC HEART FAILURE: Primary | ICD-10-CM

## 2021-01-07 ENCOUNTER — CLINICAL SUPPORT (OUTPATIENT)
Dept: CARDIOLOGY | Facility: HOSPITAL | Age: 56
End: 2021-01-07
Payer: MEDICARE

## 2021-01-07 DIAGNOSIS — Z95.810 PRESENCE OF AUTOMATIC (IMPLANTABLE) CARDIAC DEFIBRILLATOR: ICD-10-CM

## 2021-01-07 PROCEDURE — 93295 CARDIAC DEVICE CHECK - REMOTE: ICD-10-PCS | Mod: ,,, | Performed by: INTERNAL MEDICINE

## 2021-01-07 PROCEDURE — 93295 DEV INTERROG REMOTE 1/2/MLT: CPT | Mod: ,,, | Performed by: INTERNAL MEDICINE

## 2021-01-07 PROCEDURE — 93296 REM INTERROG EVL PM/IDS: CPT | Performed by: INTERNAL MEDICINE

## 2021-01-14 ENCOUNTER — OFFICE VISIT (OUTPATIENT)
Dept: TRANSPLANT | Facility: CLINIC | Age: 56
End: 2021-01-14
Payer: MEDICARE

## 2021-01-14 ENCOUNTER — LAB VISIT (OUTPATIENT)
Dept: LAB | Facility: HOSPITAL | Age: 56
End: 2021-01-14
Attending: INTERNAL MEDICINE
Payer: MEDICARE

## 2021-01-14 VITALS
SYSTOLIC BLOOD PRESSURE: 132 MMHG | WEIGHT: 195.31 LBS | BODY MASS INDEX: 32.54 KG/M2 | HEART RATE: 104 BPM | HEIGHT: 65 IN | DIASTOLIC BLOOD PRESSURE: 92 MMHG

## 2021-01-14 DIAGNOSIS — N18.4 TYPE 2 DIABETES MELLITUS WITH STAGE 4 CHRONIC KIDNEY DISEASE, WITHOUT LONG-TERM CURRENT USE OF INSULIN: ICD-10-CM

## 2021-01-14 DIAGNOSIS — I25.10 CORONARY ARTERY DISEASE INVOLVING NATIVE CORONARY ARTERY OF NATIVE HEART WITHOUT ANGINA PECTORIS: ICD-10-CM

## 2021-01-14 DIAGNOSIS — I42.0 ISCHEMIC DILATED CARDIOMYOPATHY: ICD-10-CM

## 2021-01-14 DIAGNOSIS — I50.22 CHRONIC SYSTOLIC HEART FAILURE: ICD-10-CM

## 2021-01-14 DIAGNOSIS — I10 ESSENTIAL HYPERTENSION: ICD-10-CM

## 2021-01-14 DIAGNOSIS — N18.4 CHRONIC KIDNEY DISEASE, STAGE 4, SEVERELY DECREASED GFR: ICD-10-CM

## 2021-01-14 DIAGNOSIS — E11.22 TYPE 2 DIABETES MELLITUS WITH STAGE 4 CHRONIC KIDNEY DISEASE, WITHOUT LONG-TERM CURRENT USE OF INSULIN: ICD-10-CM

## 2021-01-14 DIAGNOSIS — Z95.810 ICD (IMPLANTABLE CARDIOVERTER-DEFIBRILLATOR), SINGLE, IN SITU: ICD-10-CM

## 2021-01-14 DIAGNOSIS — I50.42 CHRONIC COMBINED SYSTOLIC AND DIASTOLIC HEART FAILURE: Primary | ICD-10-CM

## 2021-01-14 DIAGNOSIS — I25.5 ISCHEMIC CARDIOMYOPATHY: ICD-10-CM

## 2021-01-14 DIAGNOSIS — I25.5 ISCHEMIC DILATED CARDIOMYOPATHY: ICD-10-CM

## 2021-01-14 LAB
BNP SERPL-MCNC: <10 PG/ML (ref 0–99)
MAGNESIUM SERPL-MCNC: 2.4 MG/DL (ref 1.6–2.6)

## 2021-01-14 PROCEDURE — 83735 ASSAY OF MAGNESIUM: CPT

## 2021-01-14 PROCEDURE — 99215 OFFICE O/P EST HI 40 MIN: CPT | Mod: S$GLB,,, | Performed by: INTERNAL MEDICINE

## 2021-01-14 PROCEDURE — 36415 COLL VENOUS BLD VENIPUNCTURE: CPT

## 2021-01-14 PROCEDURE — 83880 ASSAY OF NATRIURETIC PEPTIDE: CPT

## 2021-01-14 PROCEDURE — 99999 PR PBB SHADOW E&M-EST. PATIENT-LVL IV: ICD-10-PCS | Mod: PBBFAC,,, | Performed by: INTERNAL MEDICINE

## 2021-01-14 PROCEDURE — 99999 PR PBB SHADOW E&M-EST. PATIENT-LVL IV: CPT | Mod: PBBFAC,,, | Performed by: INTERNAL MEDICINE

## 2021-01-14 PROCEDURE — 99215 PR OFFICE/OUTPT VISIT, EST, LEVL V, 40-54 MIN: ICD-10-PCS | Mod: S$GLB,,, | Performed by: INTERNAL MEDICINE

## 2021-01-14 RX ORDER — SACUBITRIL AND VALSARTAN 49; 51 MG/1; MG/1
1 TABLET, FILM COATED ORAL 2 TIMES DAILY
Qty: 90 TABLET | Refills: 3 | Status: SHIPPED | OUTPATIENT
Start: 2021-01-14 | End: 2021-07-26 | Stop reason: SDUPTHER

## 2021-03-18 ENCOUNTER — TELEPHONE (OUTPATIENT)
Dept: ELECTROPHYSIOLOGY | Facility: CLINIC | Age: 56
End: 2021-03-18

## 2021-04-07 ENCOUNTER — CLINICAL SUPPORT (OUTPATIENT)
Dept: CARDIOLOGY | Facility: HOSPITAL | Age: 56
End: 2021-04-07
Payer: MEDICARE

## 2021-04-07 DIAGNOSIS — Z95.810 PRESENCE OF AUTOMATIC (IMPLANTABLE) CARDIAC DEFIBRILLATOR: ICD-10-CM

## 2021-04-07 PROCEDURE — 93295 DEV INTERROG REMOTE 1/2/MLT: CPT | Mod: ,,, | Performed by: INTERNAL MEDICINE

## 2021-04-07 PROCEDURE — 93295 CARDIAC DEVICE CHECK - REMOTE: ICD-10-PCS | Mod: ,,, | Performed by: INTERNAL MEDICINE

## 2021-04-07 PROCEDURE — 93296 REM INTERROG EVL PM/IDS: CPT | Performed by: INTERNAL MEDICINE

## 2021-04-13 ENCOUNTER — TELEPHONE (OUTPATIENT)
Dept: CARDIOLOGY | Facility: HOSPITAL | Age: 56
End: 2021-04-13

## 2021-04-28 DIAGNOSIS — Z12.11 COLON CANCER SCREENING: ICD-10-CM

## 2021-06-04 ENCOUNTER — TELEPHONE (OUTPATIENT)
Dept: TRANSPLANT | Facility: CLINIC | Age: 56
End: 2021-06-04

## 2021-06-08 ENCOUNTER — TELEPHONE (OUTPATIENT)
Dept: TRANSPLANT | Facility: CLINIC | Age: 56
End: 2021-06-08

## 2021-06-17 ENCOUNTER — TELEPHONE (OUTPATIENT)
Dept: TRANSPLANT | Facility: CLINIC | Age: 56
End: 2021-06-17

## 2021-06-22 ENCOUNTER — PATIENT OUTREACH (OUTPATIENT)
Dept: ADMINISTRATIVE | Facility: HOSPITAL | Age: 56
End: 2021-06-22

## 2021-07-06 ENCOUNTER — CLINICAL SUPPORT (OUTPATIENT)
Dept: CARDIOLOGY | Facility: HOSPITAL | Age: 56
End: 2021-07-06
Payer: MEDICARE

## 2021-07-06 DIAGNOSIS — Z95.810 PRESENCE OF AUTOMATIC (IMPLANTABLE) CARDIAC DEFIBRILLATOR: ICD-10-CM

## 2021-07-06 PROCEDURE — 93296 REM INTERROG EVL PM/IDS: CPT | Performed by: INTERNAL MEDICINE

## 2021-07-06 PROCEDURE — 93295 CARDIAC DEVICE CHECK - REMOTE: ICD-10-PCS | Mod: ,,, | Performed by: INTERNAL MEDICINE

## 2021-07-06 PROCEDURE — 93295 DEV INTERROG REMOTE 1/2/MLT: CPT | Mod: ,,, | Performed by: INTERNAL MEDICINE

## 2021-07-15 ENCOUNTER — TELEPHONE (OUTPATIENT)
Dept: TRANSPLANT | Facility: CLINIC | Age: 56
End: 2021-07-15

## 2021-07-26 ENCOUNTER — TELEPHONE (OUTPATIENT)
Dept: TRANSPLANT | Facility: CLINIC | Age: 56
End: 2021-07-26

## 2021-07-26 ENCOUNTER — TELEPHONE (OUTPATIENT)
Dept: CARDIOLOGY | Facility: HOSPITAL | Age: 56
End: 2021-07-26

## 2021-09-08 ENCOUNTER — PATIENT OUTREACH (OUTPATIENT)
Dept: ADMINISTRATIVE | Facility: HOSPITAL | Age: 56
End: 2021-09-08

## 2021-10-04 ENCOUNTER — TELEPHONE (OUTPATIENT)
Dept: ELECTROPHYSIOLOGY | Facility: CLINIC | Age: 56
End: 2021-10-04

## 2021-10-07 ENCOUNTER — TELEPHONE (OUTPATIENT)
Dept: CARDIOLOGY | Facility: HOSPITAL | Age: 56
End: 2021-10-07

## 2021-10-11 ENCOUNTER — TELEPHONE (OUTPATIENT)
Dept: CARDIOLOGY | Facility: HOSPITAL | Age: 56
End: 2021-10-11

## 2021-10-22 ENCOUNTER — CLINICAL SUPPORT (OUTPATIENT)
Dept: CARDIOLOGY | Facility: HOSPITAL | Age: 56
End: 2021-10-22
Attending: INTERNAL MEDICINE
Payer: MEDICARE

## 2021-10-22 ENCOUNTER — TELEPHONE (OUTPATIENT)
Dept: ELECTROPHYSIOLOGY | Facility: CLINIC | Age: 56
End: 2021-10-22

## 2021-10-22 ENCOUNTER — TELEPHONE (OUTPATIENT)
Dept: CARDIOLOGY | Facility: HOSPITAL | Age: 56
End: 2021-10-22

## 2021-10-22 ENCOUNTER — OFFICE VISIT (OUTPATIENT)
Dept: ELECTROPHYSIOLOGY | Facility: CLINIC | Age: 56
End: 2021-10-22
Payer: MEDICARE

## 2021-10-22 ENCOUNTER — HOSPITAL ENCOUNTER (OUTPATIENT)
Dept: CARDIOLOGY | Facility: CLINIC | Age: 56
Discharge: HOME | End: 2021-10-22
Payer: MEDICARE

## 2021-10-22 VITALS
BODY MASS INDEX: 31.22 KG/M2 | SYSTOLIC BLOOD PRESSURE: 115 MMHG | WEIGHT: 187.38 LBS | HEART RATE: 59 BPM | HEIGHT: 65 IN | DIASTOLIC BLOOD PRESSURE: 72 MMHG

## 2021-10-22 DIAGNOSIS — Z95.810 ICD (IMPLANTABLE CARDIOVERTER-DEFIBRILLATOR), SINGLE, IN SITU: Primary | ICD-10-CM

## 2021-10-22 DIAGNOSIS — I42.0 ISCHEMIC DILATED CARDIOMYOPATHY: ICD-10-CM

## 2021-10-22 DIAGNOSIS — I25.5 ISCHEMIC DILATED CARDIOMYOPATHY: ICD-10-CM

## 2021-10-22 DIAGNOSIS — I49.8 OTHER SPECIFIED CARDIAC ARRHYTHMIAS: ICD-10-CM

## 2021-10-22 DIAGNOSIS — I10 PRIMARY HYPERTENSION: ICD-10-CM

## 2021-10-22 DIAGNOSIS — I49.9 ARRHYTHMIA: ICD-10-CM

## 2021-10-22 PROCEDURE — 99999 PR PBB SHADOW E&M-EST. PATIENT-LVL IV: CPT | Mod: PBBFAC,,, | Performed by: NURSE PRACTITIONER

## 2021-10-22 PROCEDURE — 93000 ELECTROCARDIOGRAM COMPLETE: CPT | Mod: S$GLB,,, | Performed by: INTERNAL MEDICINE

## 2021-10-22 PROCEDURE — 99214 OFFICE O/P EST MOD 30 MIN: CPT | Mod: S$GLB,,, | Performed by: NURSE PRACTITIONER

## 2021-10-22 PROCEDURE — 93282 PRGRMG EVAL IMPLANTABLE DFB: CPT

## 2021-10-22 PROCEDURE — 93282 CARDIAC DEVICE CHECK - IN CLINIC & HOSPITAL: ICD-10-PCS | Mod: 26,,, | Performed by: INTERNAL MEDICINE

## 2021-10-22 PROCEDURE — 99214 PR OFFICE/OUTPT VISIT, EST, LEVL IV, 30-39 MIN: ICD-10-PCS | Mod: S$GLB,,, | Performed by: NURSE PRACTITIONER

## 2021-10-22 PROCEDURE — 99999 PR PBB SHADOW E&M-EST. PATIENT-LVL IV: ICD-10-PCS | Mod: PBBFAC,,, | Performed by: NURSE PRACTITIONER

## 2021-10-22 PROCEDURE — 93282 PRGRMG EVAL IMPLANTABLE DFB: CPT | Mod: 26,,, | Performed by: INTERNAL MEDICINE

## 2021-10-22 PROCEDURE — 93000 RHYTHM STRIP: ICD-10-PCS | Mod: S$GLB,,, | Performed by: INTERNAL MEDICINE

## 2021-11-04 ENCOUNTER — TELEPHONE (OUTPATIENT)
Dept: CARDIOLOGY | Facility: HOSPITAL | Age: 56
End: 2021-11-04
Payer: MEDICARE

## 2021-11-24 ENCOUNTER — CLINICAL SUPPORT (OUTPATIENT)
Dept: CARDIOLOGY | Facility: HOSPITAL | Age: 56
End: 2021-11-24
Payer: MEDICARE

## 2021-11-24 DIAGNOSIS — Z95.810 PRESENCE OF AUTOMATIC (IMPLANTABLE) CARDIAC DEFIBRILLATOR: ICD-10-CM

## 2021-11-24 PROCEDURE — 93295 CARDIAC DEVICE CHECK - REMOTE: ICD-10-PCS | Mod: ,,, | Performed by: INTERNAL MEDICINE

## 2021-11-24 PROCEDURE — 93296 REM INTERROG EVL PM/IDS: CPT | Performed by: INTERNAL MEDICINE

## 2021-11-24 PROCEDURE — 93295 DEV INTERROG REMOTE 1/2/MLT: CPT | Mod: ,,, | Performed by: INTERNAL MEDICINE

## 2021-12-02 ENCOUNTER — TELEPHONE (OUTPATIENT)
Dept: ELECTROPHYSIOLOGY | Facility: CLINIC | Age: 56
End: 2021-12-02
Payer: MEDICARE

## 2021-12-03 ENCOUNTER — TELEPHONE (OUTPATIENT)
Dept: ELECTROPHYSIOLOGY | Facility: CLINIC | Age: 56
End: 2021-12-03
Payer: MEDICARE

## 2022-02-03 LAB
LEFT EYE DM RETINOPATHY: NEGATIVE
RIGHT EYE DM RETINOPATHY: POSITIVE

## 2022-03-19 ENCOUNTER — CLINICAL SUPPORT (OUTPATIENT)
Dept: CARDIOLOGY | Facility: HOSPITAL | Age: 57
End: 2022-03-19
Attending: STUDENT IN AN ORGANIZED HEALTH CARE EDUCATION/TRAINING PROGRAM
Payer: MEDICARE

## 2022-03-19 DIAGNOSIS — I50.9 HEART FAILURE, UNSPECIFIED: ICD-10-CM

## 2022-03-19 DIAGNOSIS — Z95.810 PRESENCE OF AUTOMATIC (IMPLANTABLE) CARDIAC DEFIBRILLATOR: ICD-10-CM

## 2022-03-19 DIAGNOSIS — I47.20 VENTRICULAR TACHYCARDIA: ICD-10-CM

## 2022-03-19 DIAGNOSIS — I25.5 ISCHEMIC CARDIOMYOPATHY: ICD-10-CM

## 2022-03-19 PROCEDURE — 93295 CARDIAC DEVICE CHECK - REMOTE: ICD-10-PCS | Mod: ,,, | Performed by: INTERNAL MEDICINE

## 2022-03-19 PROCEDURE — 93295 DEV INTERROG REMOTE 1/2/MLT: CPT | Mod: ,,, | Performed by: INTERNAL MEDICINE

## 2022-03-19 PROCEDURE — 93296 REM INTERROG EVL PM/IDS: CPT | Performed by: INTERNAL MEDICINE

## 2022-06-17 ENCOUNTER — CLINICAL SUPPORT (OUTPATIENT)
Dept: CARDIOLOGY | Facility: HOSPITAL | Age: 57
End: 2022-06-17
Payer: MEDICARE

## 2022-06-17 DIAGNOSIS — Z95.810 PRESENCE OF AUTOMATIC (IMPLANTABLE) CARDIAC DEFIBRILLATOR: ICD-10-CM

## 2022-06-17 PROCEDURE — 93296 REM INTERROG EVL PM/IDS: CPT | Performed by: INTERNAL MEDICINE

## 2022-07-15 ENCOUNTER — OFFICE VISIT (OUTPATIENT)
Dept: TRANSPLANT | Facility: CLINIC | Age: 57
End: 2022-07-15
Payer: MEDICARE

## 2022-07-15 ENCOUNTER — LAB VISIT (OUTPATIENT)
Dept: LAB | Facility: HOSPITAL | Age: 57
End: 2022-07-15
Attending: INTERNAL MEDICINE
Payer: MEDICARE

## 2022-07-15 VITALS
BODY MASS INDEX: 30.81 KG/M2 | HEART RATE: 86 BPM | HEIGHT: 65 IN | SYSTOLIC BLOOD PRESSURE: 123 MMHG | DIASTOLIC BLOOD PRESSURE: 78 MMHG | WEIGHT: 184.94 LBS

## 2022-07-15 DIAGNOSIS — I42.0 ISCHEMIC DILATED CARDIOMYOPATHY: ICD-10-CM

## 2022-07-15 DIAGNOSIS — N18.4 CHRONIC KIDNEY DISEASE, STAGE 4, SEVERELY DECREASED GFR: ICD-10-CM

## 2022-07-15 DIAGNOSIS — I25.5 ISCHEMIC DILATED CARDIOMYOPATHY: ICD-10-CM

## 2022-07-15 DIAGNOSIS — I10 PRIMARY HYPERTENSION: ICD-10-CM

## 2022-07-15 DIAGNOSIS — I50.42 CHRONIC COMBINED SYSTOLIC AND DIASTOLIC HEART FAILURE: ICD-10-CM

## 2022-07-15 DIAGNOSIS — I50.42 CHRONIC COMBINED SYSTOLIC AND DIASTOLIC HEART FAILURE: Primary | ICD-10-CM

## 2022-07-15 DIAGNOSIS — Z95.810 ICD (IMPLANTABLE CARDIOVERTER-DEFIBRILLATOR), SINGLE, IN SITU: ICD-10-CM

## 2022-07-15 DIAGNOSIS — E78.5 HYPERLIPIDEMIA, UNSPECIFIED HYPERLIPIDEMIA TYPE: ICD-10-CM

## 2022-07-15 DIAGNOSIS — I25.10 CORONARY ARTERY DISEASE INVOLVING NATIVE CORONARY ARTERY OF NATIVE HEART WITHOUT ANGINA PECTORIS: ICD-10-CM

## 2022-07-15 DIAGNOSIS — E11.22 TYPE 2 DIABETES MELLITUS WITH STAGE 4 CHRONIC KIDNEY DISEASE, WITHOUT LONG-TERM CURRENT USE OF INSULIN: ICD-10-CM

## 2022-07-15 DIAGNOSIS — N18.4 TYPE 2 DIABETES MELLITUS WITH STAGE 4 CHRONIC KIDNEY DISEASE, WITHOUT LONG-TERM CURRENT USE OF INSULIN: ICD-10-CM

## 2022-07-15 LAB
ALBUMIN SERPL BCP-MCNC: 4.1 G/DL (ref 3.5–5.2)
ALP SERPL-CCNC: 89 U/L (ref 55–135)
ALT SERPL W/O P-5'-P-CCNC: 11 U/L (ref 10–44)
ANION GAP SERPL CALC-SCNC: 8 MMOL/L (ref 8–16)
AST SERPL-CCNC: 13 U/L (ref 10–40)
BASOPHILS # BLD AUTO: 0.04 K/UL (ref 0–0.2)
BASOPHILS NFR BLD: 0.7 % (ref 0–1.9)
BILIRUB SERPL-MCNC: 0.4 MG/DL (ref 0.1–1)
BNP SERPL-MCNC: 15 PG/ML (ref 0–99)
BUN SERPL-MCNC: 51 MG/DL (ref 6–20)
CALCIUM SERPL-MCNC: 9.7 MG/DL (ref 8.7–10.5)
CHLORIDE SERPL-SCNC: 97 MMOL/L (ref 95–110)
CO2 SERPL-SCNC: 29 MMOL/L (ref 23–29)
CREAT SERPL-MCNC: 3.2 MG/DL (ref 0.5–1.4)
DIFFERENTIAL METHOD: ABNORMAL
EOSINOPHIL # BLD AUTO: 0.2 K/UL (ref 0–0.5)
EOSINOPHIL NFR BLD: 3.1 % (ref 0–8)
ERYTHROCYTE [DISTWIDTH] IN BLOOD BY AUTOMATED COUNT: 13.8 % (ref 11.5–14.5)
EST. GFR  (AFRICAN AMERICAN): 23.4 ML/MIN/1.73 M^2
EST. GFR  (NON AFRICAN AMERICAN): 20.2 ML/MIN/1.73 M^2
GLUCOSE SERPL-MCNC: 227 MG/DL (ref 70–110)
HCT VFR BLD AUTO: 39 % (ref 40–54)
HGB BLD-MCNC: 12.2 G/DL (ref 14–18)
IMM GRANULOCYTES # BLD AUTO: 0.04 K/UL (ref 0–0.04)
IMM GRANULOCYTES NFR BLD AUTO: 0.7 % (ref 0–0.5)
LYMPHOCYTES # BLD AUTO: 1.5 K/UL (ref 1–4.8)
LYMPHOCYTES NFR BLD: 26.5 % (ref 18–48)
MAGNESIUM SERPL-MCNC: 2.4 MG/DL (ref 1.6–2.6)
MCH RBC QN AUTO: 28.4 PG (ref 27–31)
MCHC RBC AUTO-ENTMCNC: 31.3 G/DL (ref 32–36)
MCV RBC AUTO: 91 FL (ref 82–98)
MONOCYTES # BLD AUTO: 0.8 K/UL (ref 0.3–1)
MONOCYTES NFR BLD: 13.1 % (ref 4–15)
NEUTROPHILS # BLD AUTO: 3.2 K/UL (ref 1.8–7.7)
NEUTROPHILS NFR BLD: 55.9 % (ref 38–73)
NRBC BLD-RTO: 0 /100 WBC
PLATELET # BLD AUTO: 379 K/UL (ref 150–450)
PMV BLD AUTO: 9 FL (ref 9.2–12.9)
POTASSIUM SERPL-SCNC: 4.8 MMOL/L (ref 3.5–5.1)
PROT SERPL-MCNC: 9.2 G/DL (ref 6–8.4)
RBC # BLD AUTO: 4.29 M/UL (ref 4.6–6.2)
SODIUM SERPL-SCNC: 134 MMOL/L (ref 136–145)
WBC # BLD AUTO: 5.74 K/UL (ref 3.9–12.7)

## 2022-07-15 PROCEDURE — 36415 COLL VENOUS BLD VENIPUNCTURE: CPT | Performed by: INTERNAL MEDICINE

## 2022-07-15 PROCEDURE — 99214 OFFICE O/P EST MOD 30 MIN: CPT | Mod: S$GLB,,, | Performed by: INTERNAL MEDICINE

## 2022-07-15 PROCEDURE — 83880 ASSAY OF NATRIURETIC PEPTIDE: CPT | Performed by: INTERNAL MEDICINE

## 2022-07-15 PROCEDURE — 99214 PR OFFICE/OUTPT VISIT, EST, LEVL IV, 30-39 MIN: ICD-10-PCS | Mod: S$GLB,,, | Performed by: INTERNAL MEDICINE

## 2022-07-15 PROCEDURE — 99999 PR PBB SHADOW E&M-EST. PATIENT-LVL III: ICD-10-PCS | Mod: PBBFAC,,, | Performed by: INTERNAL MEDICINE

## 2022-07-15 PROCEDURE — 83735 ASSAY OF MAGNESIUM: CPT | Performed by: INTERNAL MEDICINE

## 2022-07-15 PROCEDURE — 80053 COMPREHEN METABOLIC PANEL: CPT | Performed by: INTERNAL MEDICINE

## 2022-07-15 PROCEDURE — 85025 COMPLETE CBC W/AUTO DIFF WBC: CPT | Performed by: INTERNAL MEDICINE

## 2022-07-15 PROCEDURE — 99999 PR PBB SHADOW E&M-EST. PATIENT-LVL III: CPT | Mod: PBBFAC,,, | Performed by: INTERNAL MEDICINE

## 2022-07-15 RX ORDER — ROSUVASTATIN CALCIUM 20 MG/1
20 TABLET, COATED ORAL DAILY
Qty: 90 TABLET | Refills: 3 | Status: SHIPPED | OUTPATIENT
Start: 2022-07-15 | End: 2023-05-08 | Stop reason: SDUPTHER

## 2022-07-15 NOTE — PATIENT INSTRUCTIONS
Recommend 2 gram sodium restriction and 1500 mL fluid restriction (50 oz).  Encourage physical activity with graded exercise program.  Requested patient to weigh themselves daily, and to notify us if their weight increases by more than 3 lbs in 1 day or 5 lbs in 1 week.    Let us know how much Lasix you are taking at home    We will replace atorvastatin with rosuvastatin. This medication is important to prevent future heart attacks and stroke    We will check blood work today and will need to repeat an echo

## 2022-07-15 NOTE — LETTER
July 15, 2022        Juan C West  [address] [place] [address]  Phone: [phone]  Fax: [phone]             [facility]  [address]  [facility] [address]  Phone: [phone]   Patient: Mick Monroe Jr.   MR Number: [id number]   YOB: 1965   Date of Visit: 7/15/2022       Dear Dr. Juan C West    Thank you for referring Mick Monroe to me for evaluation. Attached you will find relevant portions of my assessment and plan of care.    If you have questions, please do not hesitate to call me. I look forward to following Mick Monroe along with you.    Sincerely,    Rosa Nunes, DO    Enclosure    If you would like to receive this communication electronically, please contact [email or web address] or [phone] to request [employer] access.    [employer] is a tool which provides read-only access to select patient information with whom you have a relationship. Its easy to use and provides real time access to review your patients record including encounter summaries, notes, results, and demographic information.    If you feel you have received this communication in error or would no longer like to receive these types of communications, please e-mail [email or web address]

## 2022-07-15 NOTE — PROGRESS NOTES
ADVANCED HEART FAILURE AND TRANSPLANTATION CLINIC VISIT    CHIEF COMPLAINT:  Follow-up heart failure    HISTORY OF PRESENT ILLNESS:  Mick Monroe Jr. is a 57 y.o.  male with a past medical history remarkable for HFrEF/ischemic cardiomyopathy who presents to [facility] for follow-up of HF.    Co-morbidities: coronary artery disease with prior PCI to LAD, DM2 not on insulin since age 18, HTN, HLD, CKD4     Last seen in clinic 1/14/2021 at which time Entresto was increased to 49-51 mg BID and consideration atorvastatin increased to 80 mg qHS. No labs done prior to today's visit. Since last visit, he states he has been doing well. Notes SOB with running but not at rest or with ADLs. Denies orthopnea, PND, bendopnea, abdominal distension, early satiety, nausea, lower extremity edema, chest discomfort, presyncope, palpitations, or syncope. Denies adverse bleeding, no hematochezia/melena/hematuria/hematemesis. Does not follow fluid or salt restriction or follow daily weights.    He states his nephrologist took him off of Lipitor for unclear reasons.     Current diuretic regimen: Lasix unclear how many mg once daily    GDMT: Toprol 50 mg daily, Entresto 49-51 mg BID, Aldactone 25 mg daily, Imdur 30 mg daily, Jardiance 25 mg daily  Aspirin 81 mg daily    Cardiac history:  Angina: neg  Myocardial infarction: +  Revascularization: PCI to LAD  CVA/TIA: neg  VTE: neg  AF/arrhythmias: neg  Obesity: BMI 30.8  Hyperlipidemia: +  DM: +  PAD: unknown    Cardiac Data:  Transthoracic Echo 6/26/2018:   1 - Upper limit of normal left ventricular enlargement. LVEDD 50 mm    2 - Moderately depressed left ventricular systolic function (EF upper 30s to low 40s).     3 - Impaired LV relaxation, normal LAP (grade 1 diastolic dysfunction).     4 - Right ventricular enlargement with mildly depressed systolic function.     5 - Trivial aortic regurgitation.     6 - Mild tricuspid regurgitation.     7 - Trivial pulmonic  regurgitation.     8 - The estimated PA systolic pressure is 25 mmHg.     ECG 10/22/2021: SR with sinus arrhythmia 64 bpm, QRS 88 ms    Left Heart Catheterization: none recent    Right Heart Catheterization: No results found for this or any previous visit.    Devices: SQ ICD    PAST MEDICAL HISTORY:  Past Medical History:   Diagnosis Date    Arthritis     CHF (congestive heart failure)     Chronic renal disease, stage IV     Coronary atherosclerosis of unspecified type of vessel, native or graft     Diabetes mellitus     Diabetes mellitus, type 2     Hypertension        PAST SURGICAL HISTORY:  Past Surgical History:   Procedure Laterality Date    APPENDECTOMY      CATARACT EXTRACTION         SOCIAL HISTORY  Social History     Socioeconomic History    Marital status: Single    Number of children: 3   Occupational History     Employer: Not Employed   Tobacco Use    Smoking status: Former Smoker    Smokeless tobacco: Current User     Types: Chew   Substance and Sexual Activity    Alcohol use: No    Drug use: No    Sexual activity: Not Currently   quit chewing tobacco 11/2021    FAMILY HISTORY  Family History   Problem Relation Age of Onset    Cancer Mother     Diabetes Father     Cataracts Father     Cataracts Maternal Aunt     Blindness Maternal Uncle     Amblyopia Neg Hx     Glaucoma Neg Hx     Retinal detachment Neg Hx     Macular degeneration Neg Hx     Strabismus Neg Hx        ALLERGIES:  Review of patient's allergies indicates:  No Known Allergies    MEDICATIONS  Current Outpatient Medications on File Prior to Visit   Medication Sig Dispense Refill    aspirin (ECOTRIN) 81 MG EC tablet Take 1 tablet (81 mg total) by mouth Daily. 30 tablet 11    atorvastatin (LIPITOR) 40 MG tablet Take 1 tablet (40 mg total) by mouth every evening. 90 tablet 3    blood sugar diagnostic Strp 1 strip by Misc.(Non-Drug; Combo Route) route 2 (two) times a day. 100 each 3    blood sugar diagnostic Strp 1  each by Misc.(Non-Drug; Combo Route) route 2 (two) times a day. 100 each 6    blood-glucose meter (FREESTYLE SYSTEM KIT) kit Use as instructed 1 each 0    cephALEXin (KEFLEX) 500 MG capsule Take 500 mg by mouth 3 (three) times daily.      empagliflozin (JARDIANCE) 25 mg tablet Take 1 tablet (25 mg total) by mouth once daily. 30 tablet 5    furosemide (LASIX) 40 MG tablet Take 1 tablet (40 mg total) by mouth once daily. 30 tablet 5    furosemide (LASIX) 40 MG tablet Take 1 tablet (40 mg total) by mouth 2 (two) times daily. 60 tablet 11    glimepiride (AMARYL) 2 MG tablet Take 1 tablet (2 mg total) by mouth before breakfast. 30 tablet 3    isosorbide mononitrate (IMDUR) 30 MG 24 hr tablet Take 1 tablet (30 mg total) by mouth once daily. 90 tablet 3    lancets (ONETOUCH ULTRASOFT LANCETS) Misc 100 each by Misc.(Non-Drug; Combo Route) route 2 (two) times a day. 100 each 3    lancets (ULTRA THIN LANCETS) 30 gauge Misc USE TO CHECK GLUCOSE TWICE DAILY 100 each 3    metoprolol succinate (TOPROL-XL) 50 MG 24 hr tablet Take 1 tablet (50 mg total) by mouth once daily. 30 tablet 11    pantoprazole (PROTONIX) 20 MG tablet Take 1 tablet (20 mg total) by mouth once daily. 30 tablet 0    sacubitriL-valsartan (ENTRESTO) 49-51 mg per tablet Take 1 tablet by mouth 2 (two) times daily. 90 tablet 3    spironolactone (ALDACTONE) 25 MG tablet Take 1 tablet (25 mg total) by mouth once daily. 30 tablet 3     No current facility-administered medications on file prior to visit.       REVIEW OF SYSTEMS  Review of Systems   Constitutional: Negative for activity change and fatigue.   Respiratory: Negative for chest tightness and shortness of breath.    Cardiovascular: Negative for chest pain, palpitations and leg swelling.   Gastrointestinal: Negative for abdominal distention, blood in stool, nausea and vomiting.   Genitourinary: Negative for difficulty urinating and hematuria.   Neurological: Negative for syncope and  "light-headedness.   Hematological: Does not bruise/bleed easily.   All other systems reviewed and are negative.      PHYSICAL EXAM:    Vitals:    07/15/22 0912   BP: 123/78   Pulse: 86   Weight: 83.9 kg (184 lb 15.5 oz)   Height: 5' 5" (1.651 m)    Body mass index is 30.78 kg/m².    GENERAL: Alert, NAD   HEENT: Anicteric sclerae  NECK: JVP visible above level of clavicle while sitting upright and noted upper neck while reclining 45 degrees  CARDIOVASCULAR: Regular rate and rhythm. Normal S1, S2 no murmurs, rubs or gallops.  PULMONARY: Lungs clear to auscultation bilaterally  ABDOMEN: Soft, nontender, nondistended. No guarding, no rebound, no hepatomegaly  EXTREMITIES: Warm and well-perfused. No clubbing, cyanosis or edema. No pre-sacral edema  VASCULAR: 2+ bilateral radial pulses  NEUROLOGIC: No focal deficits    LABS:    BMP  Lab Results   Component Value Date     (L) 12/02/2021    K 4.5 12/02/2021    CL 97 12/02/2021    CO2 27 12/02/2021    BUN 41 (H) 12/02/2021    CREATININE 2.9 (H) 12/02/2021    CALCIUM 8.8 12/02/2021    ANIONGAP 5 (L) 12/02/2021    ESTGFRAFRICA 27 (A) 12/02/2021    EGFRNONAA 23 (A) 12/02/2021       Magnesium   Date Value Ref Range Status   01/14/2021 2.4 1.6 - 2.6 mg/dL Final       Lab Results   Component Value Date    WBC 4.35 01/14/2021    HGB 13.5 (L) 01/14/2021    HCT 44.0 01/14/2021    MCV 90 01/14/2021     01/14/2021       Lab Results   Component Value Date    INR 1.0 08/31/2017       BNP   Date Value Ref Range Status   01/14/2021 <10 0 - 99 pg/mL Final     Comment:     Values of less than 100 pg/ml are consistent with non-CHF populations.   12/02/2019 38 0 - 99 pg/mL Final     Comment:     Values of less than 100 pg/ml are consistent with non-CHF populations.   12/05/2018 18 0 - 99 pg/mL Final     Comment:     Values of less than 100 pg/ml are consistent with non-CHF populations.       No results found for: LDH    IMPRESSION:    NYHA Class II  ACC/AHA Stage C  Rex " profile B    1. Chronic combined systolic and diastolic heart failure    2. Ischemic dilated cardiomyopathy    3. Coronary artery disease involving native coronary artery of native heart without angina pectoris    4. ICD (implantable cardioverter-defibrillator), single, in situ    5. Primary hypertension    6. Chronic kidney disease, stage 4, severely decreased GFR    7. Type 2 diabetes mellitus with stage 4 chronic kidney disease, without long-term current use of insulin    8. Hyperlipidemia, unspecified hyperlipidemia type        PLAN:    No labs at today's visit and he is unsure of dose of Lasix currently but appears mildly volume up by exam. Instructed on starting fluid and salt restriction and to inform us of Lasix dose. Labs ordered to be done after visit today along with repeat echo.    Start Crestor 20 mg daily in place of Lipitor that was discontinued for unclear reasons. Get lipid profile today.    Recommend 2 gram sodium restriction and 1500 mL fluid restriction.  Encourage physical activity with graded exercise program.  Requested patient to weigh themselves daily, and to notify us if their weight increases by more than 3 lbs in 1 day or 5 lbs in 1 week.     Pt to call us with more shortness of breath, swelling or unexpected weight changes, fever, chills, bloody or black bowel movements, or other concerns.    F/U 6 months with clinic visit, labs     Electronically signed by:   Rosa Nunes   07/15/2022 7:46 AM

## 2022-08-09 ENCOUNTER — PATIENT OUTREACH (OUTPATIENT)
Dept: ADMINISTRATIVE | Facility: HOSPITAL | Age: 57
End: 2022-08-09
Payer: MEDICARE

## 2022-08-10 ENCOUNTER — TELEPHONE (OUTPATIENT)
Dept: TRANSPLANT | Facility: CLINIC | Age: 57
End: 2022-08-10
Payer: MEDICARE

## 2022-08-10 DIAGNOSIS — I50.42 CHRONIC COMBINED SYSTOLIC AND DIASTOLIC HEART FAILURE: ICD-10-CM

## 2022-08-10 RX ORDER — FUROSEMIDE 40 MG/1
40 TABLET ORAL 2 TIMES DAILY
Qty: 60 TABLET | Refills: 5 | Status: SHIPPED | OUTPATIENT
Start: 2022-08-10 | End: 2023-05-08 | Stop reason: SDUPTHER

## 2022-08-10 NOTE — TELEPHONE ENCOUNTER
"1230 pm: F/U on todays nurse lab review  Noted pt did not show for fasting lipid panel or echo   Noted this was actually rescheduled from last week   See Dr. Nunes's 7/15/22 clinic note  Called and spoke with pt at this time  Pt told me he tried to call to cancel and reschedule these appts    Pt said he needs a 48 hours notice to arrange his transportation  Pt said it was scheduled this past Friday and when he called Monday 8/8, he was told that would not be enough time    Told pt I will ask our appt coordinators contact him today to reschedule this fasting lab appt and echo mid next week since already Wednesday and will not be enough time for the 48 hour transportation notice to have it done this week  Pt agreed    Pt then asked for a refill for his Furosemide but wasn't sure of the dose  Pt said he takes it twice a day   Epic medication record indicates he takes Fursemide 40 mg once daily  Dr. Harrison's clinic note indicated pt was unsure of dose at that time.   Pt told me he has been out of this medication for 2 weeks-stating he has been trying to get it filled @ [facility]    Asked pt to look at his bottle , if he still has it, and let me know the mg and how many pills a day  Pt said he was not home, would call back and let me know      1:10 pm:  Pt did call back and said it is Furosemide 40 mg and he has been taking it twice a day for a "very long time".   Told pt I will discuss with Dr. Nunes and if there is a concern will call him back, if f not the prescription will be sent to the pharmacy on his profile we verified together   Asked pt to please let me know if he has any trouble in getting this medicine today or tomorrow and in the future to please contact this office prior to running out of medications now that he is re established with us  Pt agreed and I provided pt with this office contact phone number. ( had done this earlier )     Noted pt had lab work done 7/15/22 at clinic visit and thinking " can add a bmp to lipid lab appt once rescheduled   1:20 pm: Phone message sent to Dr. Harrison regarding lasix as outlined in my note above   Per anjali Buckner to resume Lasix 40 mg twice daily

## 2022-08-22 ENCOUNTER — TELEPHONE (OUTPATIENT)
Dept: TRANSPLANT | Facility: CLINIC | Age: 57
End: 2022-08-22

## 2022-08-22 NOTE — TELEPHONE ENCOUNTER
2:20 pm: Pt on nurse reminder schedule today for labs to watch Cr/K and see NN 8/10    JITENDRA Mujica informed me of the following:  [2:10 PM] Mikayla Monroe reschedule his labs he said he have gout and he couldnt walk .. labs is Rescheduled for next Monday reason why because he have to call the van service that he used and it takes 4to 5 days    See my 8/10/22 NN -pt was rescheduled for labs 8/19 and also cancelled that lab appt

## 2022-08-29 ENCOUNTER — TELEPHONE (OUTPATIENT)
Dept: TRANSPLANT | Facility: CLINIC | Age: 57
End: 2022-08-29
Payer: MEDICARE

## 2022-08-29 ENCOUNTER — HOSPITAL ENCOUNTER (OUTPATIENT)
Dept: CARDIOLOGY | Facility: HOSPITAL | Age: 57
Discharge: HOME | End: 2022-08-29
Attending: INTERNAL MEDICINE
Payer: MEDICARE

## 2022-08-29 VITALS
SYSTOLIC BLOOD PRESSURE: 104 MMHG | BODY MASS INDEX: 29.99 KG/M2 | DIASTOLIC BLOOD PRESSURE: 62 MMHG | HEIGHT: 65 IN | WEIGHT: 180 LBS | HEART RATE: 64 BPM

## 2022-08-29 DIAGNOSIS — I50.42 CHRONIC COMBINED SYSTOLIC AND DIASTOLIC HEART FAILURE: ICD-10-CM

## 2022-08-29 DIAGNOSIS — I42.0 ISCHEMIC DILATED CARDIOMYOPATHY: ICD-10-CM

## 2022-08-29 DIAGNOSIS — I25.5 ISCHEMIC DILATED CARDIOMYOPATHY: ICD-10-CM

## 2022-08-29 LAB
ASCENDING AORTA: 3.34 CM
AV INDEX (PROSTH): 0.95
AV MEAN GRADIENT: 1 MMHG
AV PEAK GRADIENT: 2 MMHG
AV VALVE AREA: 4.11 CM2
AV VELOCITY RATIO: 0.84
BSA FOR ECHO PROCEDURE: 1.93 M2
CV ECHO LV RWT: 0.43 CM
DOP CALC AO PEAK VEL: 0.68 M/S
DOP CALC AO VTI: 13.18 CM
DOP CALC LVOT AREA: 4.3 CM2
DOP CALC LVOT DIAMETER: 2.35 CM
DOP CALC LVOT PEAK VEL: 0.57 M/S
DOP CALC LVOT STROKE VOLUME: 54.19 CM3
DOP CALCLVOT PEAK VEL VTI: 12.5 CM
E WAVE DECELERATION TIME: 277.62 MSEC
E/A RATIO: 0.75
E/E' RATIO: 7.43 M/S
ECHO LV POSTERIOR WALL: 0.91 CM (ref 0.6–1.1)
EJECTION FRACTION: 50 %
FRACTIONAL SHORTENING: 26 % (ref 28–44)
INTERVENTRICULAR SEPTUM: 1.03 CM (ref 0.6–1.1)
IVRT: 117.03 MSEC
LA MAJOR: 3.26 CM
LA MINOR: 3.24 CM
LA WIDTH: 3.81 CM
LEFT ATRIUM SIZE: 3.8 CM
LEFT ATRIUM VOLUME INDEX MOD: 12.1 ML/M2
LEFT ATRIUM VOLUME INDEX: 21.2 ML/M2
LEFT ATRIUM VOLUME MOD: 22.87 CM3
LEFT ATRIUM VOLUME: 40 CM3
LEFT INTERNAL DIMENSION IN SYSTOLE: 3.16 CM (ref 2.1–4)
LEFT VENTRICLE DIASTOLIC VOLUME INDEX: 43.05 ML/M2
LEFT VENTRICLE DIASTOLIC VOLUME: 81.36 ML
LEFT VENTRICLE MASS INDEX: 71 G/M2
LEFT VENTRICLE SYSTOLIC VOLUME INDEX: 21 ML/M2
LEFT VENTRICLE SYSTOLIC VOLUME: 39.73 ML
LEFT VENTRICULAR INTERNAL DIMENSION IN DIASTOLE: 4.26 CM (ref 3.5–6)
LEFT VENTRICULAR MASS: 134.57 G
LV LATERAL E/E' RATIO: 6.5 M/S
LV SEPTAL E/E' RATIO: 8.67 M/S
MV A" WAVE DURATION": 12.84 MSEC
MV PEAK A VEL: 0.69 M/S
MV PEAK E VEL: 0.52 M/S
MV STENOSIS PRESSURE HALF TIME: 80.51 MS
MV VALVE AREA P 1/2 METHOD: 2.73 CM2
PISA TR MAX VEL: 2 M/S
PULM VEIN S/D RATIO: 1.28
PV PEAK D VEL: 0.43 M/S
PV PEAK S VEL: 0.55 M/S
QEF: 47 %
RA MAJOR: 3.17 CM
RA PRESSURE: 3 MMHG
RA WIDTH: 2.54 CM
RIGHT VENTRICULAR END-DIASTOLIC DIMENSION: 2.79 CM
RV TISSUE DOPPLER FREE WALL SYSTOLIC VELOCITY 1 (APICAL 4 CHAMBER VIEW): 7.65 CM/S
SINUS: 2.96 CM
STJ: 3.18 CM
TDI LATERAL: 0.08 M/S
TDI SEPTAL: 0.06 M/S
TDI: 0.07 M/S
TR MAX PG: 16 MMHG
TRICUSPID ANNULAR PLANE SYSTOLIC EXCURSION: 1.52 CM
TV REST PULMONARY ARTERY PRESSURE: 19 MMHG

## 2022-08-29 PROCEDURE — 93306 ECHO (CUPID ONLY): ICD-10-PCS | Mod: 26,,, | Performed by: INTERNAL MEDICINE

## 2022-08-29 PROCEDURE — 93306 TTE W/DOPPLER COMPLETE: CPT | Mod: 26,,, | Performed by: INTERNAL MEDICINE

## 2022-08-29 PROCEDURE — 93306 TTE W/DOPPLER COMPLETE: CPT

## 2022-08-29 NOTE — TELEPHONE ENCOUNTER
8/29/22 - Received today's lab results. BUN/Cr - 99/6.5, Na/K+ 134/4.6.  Called patient and asked how he was feeling, patient stated he was feeling ok, asked if he has been urinating normally and patient stated he's not sure, he doesn't really pay attention to how much he urinates.  I instructed patient he needed to report to the ED for further evaluation of his elevated Cr.  Patient stated he lives near [place] and is too far on the road to turn back around to come to ED here, but he will go to the ED in [place].  I verbalized understanding.  Patient asked about and I explained the seriousness/importance of patient reporting directly to the ED as soon as possible and patient verbalized understanding and stated he would go straight there.  I asked patient to notify our office of the outcome.  KILEY Taylor M.D., made aware of above.

## 2022-09-15 ENCOUNTER — CLINICAL SUPPORT (OUTPATIENT)
Dept: CARDIOLOGY | Facility: HOSPITAL | Age: 57
End: 2022-09-15
Payer: MEDICARE

## 2022-09-15 DIAGNOSIS — I50.9 HEART FAILURE, UNSPECIFIED: ICD-10-CM

## 2022-09-15 DIAGNOSIS — I47.20 VENTRICULAR TACHYCARDIA: ICD-10-CM

## 2022-09-15 DIAGNOSIS — I25.5 ISCHEMIC CARDIOMYOPATHY: ICD-10-CM

## 2022-09-15 DIAGNOSIS — Z95.810 PRESENCE OF AUTOMATIC (IMPLANTABLE) CARDIAC DEFIBRILLATOR: ICD-10-CM

## 2022-09-15 PROCEDURE — 93295 DEV INTERROG REMOTE 1/2/MLT: CPT | Mod: ,,, | Performed by: INTERNAL MEDICINE

## 2022-09-15 PROCEDURE — 93296 REM INTERROG EVL PM/IDS: CPT | Performed by: INTERNAL MEDICINE

## 2022-09-15 PROCEDURE — 93295 CARDIAC DEVICE CHECK - REMOTE: ICD-10-PCS | Mod: ,,, | Performed by: INTERNAL MEDICINE

## 2022-09-16 ENCOUNTER — TELEPHONE (OUTPATIENT)
Dept: ELECTROPHYSIOLOGY | Facility: CLINIC | Age: 57
End: 2022-09-16
Payer: MEDICARE

## 2022-09-16 DIAGNOSIS — I49.8 OTHER CARDIAC ARRHYTHMIA: Primary | ICD-10-CM

## 2022-09-16 NOTE — TELEPHONE ENCOUNTER
Called pt to inform him of his appointment change with ALISON Pardo. No answer, left a message with the correct appointment information and sent a letter in the mail.

## 2022-09-16 NOTE — TELEPHONE ENCOUNTER
Called pt in regards to message below. Pt verbally confirmed appointment date/time and thanked me for calling. Appointment reminder letter sent in the mail, per his request.    Writer informed that patient's blood cultures came back as gram positive cocci clusters in the aerobic bottle after 23 hours. On call resident aware. Will place orders.

## 2022-09-16 NOTE — TELEPHONE ENCOUNTER
----- Message from Lynsey Miller sent at 9/16/2022  9:20 AM CDT -----  Due for MD visit and BIO ICD device check    Thanks

## 2022-11-08 ENCOUNTER — PATIENT OUTREACH (OUTPATIENT)
Dept: ADMINISTRATIVE | Facility: HOSPITAL | Age: 57
End: 2022-11-08
Payer: MEDICARE

## 2022-12-07 DIAGNOSIS — E11.9 TYPE 2 DIABETES MELLITUS WITHOUT COMPLICATION: ICD-10-CM

## 2022-12-14 ENCOUNTER — CLINICAL SUPPORT (OUTPATIENT)
Dept: CARDIOLOGY | Facility: HOSPITAL | Age: 57
End: 2022-12-14
Payer: MEDICARE

## 2022-12-14 DIAGNOSIS — Z95.810 PRESENCE OF AUTOMATIC (IMPLANTABLE) CARDIAC DEFIBRILLATOR: ICD-10-CM

## 2022-12-14 PROCEDURE — 93296 REM INTERROG EVL PM/IDS: CPT | Performed by: INTERNAL MEDICINE

## 2022-12-20 ENCOUNTER — TELEPHONE (OUTPATIENT)
Dept: ELECTROPHYSIOLOGY | Facility: CLINIC | Age: 57
End: 2022-12-20
Payer: MEDICARE

## 2022-12-20 ENCOUNTER — TELEPHONE (OUTPATIENT)
Dept: INTERNAL MEDICINE | Facility: CLINIC | Age: 57
End: 2022-12-20
Payer: MEDICARE

## 2022-12-20 NOTE — TELEPHONE ENCOUNTER
----- Message from Jairo Cage MA sent at 12/20/2022  7:59 AM CST -----  Contact: Patient    ----- Message -----  From: Thu Winston RN  Sent: 12/19/2022   4:01 PM CST  To: Jairo Cage MA    Can you please let him know that we only clear to hold blood thinners? Cardiac clearance will need to come from his general cardiologist.   Thanks!!!  ----- Message -----  From: Jairo Cage MA  Sent: 12/19/2022   3:31 PM CST  To: Thu Winston RN      ----- Message -----  From: Michelle Felix  Sent: 12/19/2022   3:02 PM CST  To: [facility] Staff    Patient call in requesting a clearance from cardiologist for a tooth removal     Please assist    Patient can be reach at  [phone]

## 2022-12-22 DIAGNOSIS — I50.42 CHRONIC COMBINED SYSTOLIC AND DIASTOLIC HEART FAILURE: Primary | ICD-10-CM

## 2023-01-09 ENCOUNTER — LAB VISIT (OUTPATIENT)
Dept: LAB | Facility: HOSPITAL | Age: 58
End: 2023-01-09
Attending: INTERNAL MEDICINE
Payer: MEDICARE

## 2023-01-09 ENCOUNTER — OFFICE VISIT (OUTPATIENT)
Dept: TRANSPLANT | Facility: CLINIC | Age: 58
End: 2023-01-09
Payer: MEDICARE

## 2023-01-09 VITALS
BODY MASS INDEX: 30.52 KG/M2 | HEIGHT: 65 IN | HEART RATE: 92 BPM | DIASTOLIC BLOOD PRESSURE: 85 MMHG | SYSTOLIC BLOOD PRESSURE: 153 MMHG | WEIGHT: 183.19 LBS

## 2023-01-09 DIAGNOSIS — I42.0 ISCHEMIC DILATED CARDIOMYOPATHY: ICD-10-CM

## 2023-01-09 DIAGNOSIS — Z95.810 ICD (IMPLANTABLE CARDIOVERTER-DEFIBRILLATOR), SINGLE, IN SITU: ICD-10-CM

## 2023-01-09 DIAGNOSIS — I25.10 CORONARY ARTERY DISEASE INVOLVING NATIVE CORONARY ARTERY OF NATIVE HEART WITHOUT ANGINA PECTORIS: ICD-10-CM

## 2023-01-09 DIAGNOSIS — N18.4 CHRONIC KIDNEY DISEASE, STAGE 4, SEVERELY DECREASED GFR: ICD-10-CM

## 2023-01-09 DIAGNOSIS — I50.42 CHRONIC COMBINED SYSTOLIC AND DIASTOLIC HEART FAILURE: ICD-10-CM

## 2023-01-09 DIAGNOSIS — I25.5 ISCHEMIC DILATED CARDIOMYOPATHY: ICD-10-CM

## 2023-01-09 DIAGNOSIS — I10 PRIMARY HYPERTENSION: ICD-10-CM

## 2023-01-09 DIAGNOSIS — I50.42 CHRONIC COMBINED SYSTOLIC AND DIASTOLIC HEART FAILURE: Primary | ICD-10-CM

## 2023-01-09 LAB
ANION GAP SERPL CALC-SCNC: 10 MMOL/L (ref 8–16)
BASOPHILS # BLD AUTO: 0.03 K/UL (ref 0–0.2)
BASOPHILS NFR BLD: 0.5 % (ref 0–1.9)
BUN SERPL-MCNC: 39 MG/DL (ref 6–20)
CALCIUM SERPL-MCNC: 9.3 MG/DL (ref 8.7–10.5)
CHLORIDE SERPL-SCNC: 98 MMOL/L (ref 95–110)
CO2 SERPL-SCNC: 27 MMOL/L (ref 23–29)
CREAT SERPL-MCNC: 2 MG/DL (ref 0.5–1.4)
DIFFERENTIAL METHOD: ABNORMAL
EOSINOPHIL # BLD AUTO: 0.1 K/UL (ref 0–0.5)
EOSINOPHIL NFR BLD: 1.7 % (ref 0–8)
ERYTHROCYTE [DISTWIDTH] IN BLOOD BY AUTOMATED COUNT: 15.9 % (ref 11.5–14.5)
EST. GFR  (NO RACE VARIABLE): 38 ML/MIN/1.73 M^2
GLUCOSE SERPL-MCNC: 248 MG/DL (ref 70–110)
HCT VFR BLD AUTO: 37.6 % (ref 40–54)
HGB BLD-MCNC: 11.4 G/DL (ref 14–18)
IMM GRANULOCYTES # BLD AUTO: 0.02 K/UL (ref 0–0.04)
IMM GRANULOCYTES NFR BLD AUTO: 0.3 % (ref 0–0.5)
LYMPHOCYTES # BLD AUTO: 1.4 K/UL (ref 1–4.8)
LYMPHOCYTES NFR BLD: 22.5 % (ref 18–48)
MAGNESIUM SERPL-MCNC: 2.2 MG/DL (ref 1.6–2.6)
MCH RBC QN AUTO: 27 PG (ref 27–31)
MCHC RBC AUTO-ENTMCNC: 30.3 G/DL (ref 32–36)
MCV RBC AUTO: 89 FL (ref 82–98)
MONOCYTES # BLD AUTO: 0.9 K/UL (ref 0.3–1)
MONOCYTES NFR BLD: 14.2 % (ref 4–15)
NEUTROPHILS # BLD AUTO: 3.7 K/UL (ref 1.8–7.7)
NEUTROPHILS NFR BLD: 60.8 % (ref 38–73)
NRBC BLD-RTO: 0 /100 WBC
PLATELET # BLD AUTO: 317 K/UL (ref 150–450)
PMV BLD AUTO: 9.3 FL (ref 9.2–12.9)
POTASSIUM SERPL-SCNC: 3.9 MMOL/L (ref 3.5–5.1)
RBC # BLD AUTO: 4.23 M/UL (ref 4.6–6.2)
SODIUM SERPL-SCNC: 135 MMOL/L (ref 136–145)
WBC # BLD AUTO: 6 K/UL (ref 3.9–12.7)

## 2023-01-09 PROCEDURE — 83735 ASSAY OF MAGNESIUM: CPT | Performed by: INTERNAL MEDICINE

## 2023-01-09 PROCEDURE — 99999 PR PBB SHADOW E&M-EST. PATIENT-LVL III: ICD-10-PCS | Mod: PBBFAC,,, | Performed by: INTERNAL MEDICINE

## 2023-01-09 PROCEDURE — 99215 PR OFFICE/OUTPT VISIT, EST, LEVL V, 40-54 MIN: ICD-10-PCS | Mod: S$GLB,,, | Performed by: INTERNAL MEDICINE

## 2023-01-09 PROCEDURE — 80048 BASIC METABOLIC PNL TOTAL CA: CPT | Performed by: INTERNAL MEDICINE

## 2023-01-09 PROCEDURE — 99215 OFFICE O/P EST HI 40 MIN: CPT | Mod: S$GLB,,, | Performed by: INTERNAL MEDICINE

## 2023-01-09 PROCEDURE — 36415 COLL VENOUS BLD VENIPUNCTURE: CPT | Performed by: INTERNAL MEDICINE

## 2023-01-09 PROCEDURE — 85025 COMPLETE CBC W/AUTO DIFF WBC: CPT | Performed by: INTERNAL MEDICINE

## 2023-01-09 PROCEDURE — 83880 ASSAY OF NATRIURETIC PEPTIDE: CPT | Performed by: INTERNAL MEDICINE

## 2023-01-09 PROCEDURE — 99999 PR PBB SHADOW E&M-EST. PATIENT-LVL III: CPT | Mod: PBBFAC,,, | Performed by: INTERNAL MEDICINE

## 2023-01-09 RX ORDER — CARVEDILOL 6.25 MG/1
6.25 TABLET ORAL 2 TIMES DAILY WITH MEALS
Qty: 60 TABLET | Refills: 11 | Status: SHIPPED | OUTPATIENT
Start: 2023-01-09 | End: 2024-01-09

## 2023-01-09 NOTE — LETTER
January 9, 2023        Juan C West  [address] [place] [place] [address]  Phone: [phone]  Fax: [phone]             [facility]  [address]  [facility] [address]  Phone: [phone]   Patient: Mick Monroe Jr.   MR Number: [id number]   YOB: 1965   Date of Visit: 1/9/2023       Dear Dr. Juan C West    Thank you for referring Mick Monroe to me for evaluation. Attached you will find relevant portions of my assessment and plan of care.    If you have questions, please do not hesitate to call me. I look forward to following Mick Monroe along with you.    Sincerely,    Walter Galloway MD    Enclosure    If you would like to receive this communication electronically, please contact [email or web address] or [phone] to request [email or web address] Link access.    [email or web address] Link is a tool which provides read-only access to select patient information with whom you have a relationship. Its easy to use and provides real time access to review your patients record including encounter summaries, notes, results, and demographic information.    If you feel you have received this communication in error or would no longer like to receive these types of communications, please e-mail [email or web address]

## 2023-01-09 NOTE — PROGRESS NOTES
Subjective:    Patient ID:  Mick Monroe Jr. is a 57 y.o. male who presents for follow-up of CHF.    58 YO M w/ PMH of ICM, HFrEF LVEF 47%, s/p ICD, CAD w/ PCI to LAD, DM, HTN, HLD, CKD presenting for follow up.    He was seen in clinic by Dr Nunes 6 months prior and presents today for follow up.    Subsequent to his prior visit, he was noted to have acute kidney injury with his creatinine being 6.5 on lab work done 08/29/2022.  He was instructed to go to the local hospital, and he presented to [facility].  Documentation from this visit is not available to us.  He mentioned that they took him off several medications including his cardiac meds, but is unsure if they identified a reason for his acute kidney injury.  Of note previously he was on Jardiance 25 mg daily, metoprolol succinate 50 mg daily, spironolactone 25 mg daily, and Imdur 30 mg daily.  All of these medications were stopped, and at present his only guideline directed medical therapy is medium dose Entresto.    He presents today for follow-up.  He is on disability, but says that he tries to stay active with his activities of daily living.  This includes things such as showering, cleaning the house, and cooking.  He does not go for walks or does not have regular exercise.  With his day-to-day activities he denies chest discomfort, shortness of breath, palpitations, presyncope, or syncope.  Did have a single episode of worsening leg swelling in January, and he saw his primary care doctor who told him to take his Lasix a few days in a row after which it dissipated.  He denies any abdominal swelling, changes in appetite, PND, or orthopnea.  No admissions for heart failure since he was seen in clinic last.  No shocks from his ICD.  He takes his Lasix as needed only, and has not had to take any doses recently.    Of note, he has a dental abscess and his dentist required cardiac clearance.  Unfortunately he forgot the clearance paperwork at his  home.    Review of Systems   Constitutional: Negative for chills and fever.   HENT:  Negative for hearing loss.    Eyes:  Negative for visual disturbance.   Cardiovascular:  Negative for chest pain, dyspnea on exertion, irregular heartbeat, leg swelling, orthopnea, palpitations, paroxysmal nocturnal dyspnea and syncope.   Respiratory:  Negative for cough and shortness of breath.    Musculoskeletal:  Negative for muscle weakness.   Gastrointestinal:  Negative for diarrhea, nausea and vomiting.   Neurological:  Negative for focal weakness.   Psychiatric/Behavioral:  Negative for memory loss.       Objective:      Vitals:    01/09/23 1004   BP: (!) 153/85   Pulse: 92     Physical Exam  Vitals reviewed.   Constitutional:       General: He is not in acute distress.  HENT:      Head: Atraumatic.   Eyes:      Extraocular Movements: Extraocular movements intact.   Neck:      Comments: JVP 8 cm.  Cardiovascular:      Rate and Rhythm: Normal rate and regular rhythm.      Heart sounds: Normal heart sounds.   Pulmonary:      Breath sounds: Normal breath sounds.   Abdominal:      Palpations: Abdomen is soft.      Tenderness: There is no abdominal tenderness.   Musculoskeletal:         General: Normal range of motion.      Right lower leg: No edema.      Left lower leg: No edema.   Neurological:      General: No focal deficit present.      Mental Status: He is alert and oriented to person, place, and time.         Assessment:       1. Chronic combined systolic and diastolic heart failure    2. Coronary artery disease involving native coronary artery of native heart without angina pectoris    3. Primary hypertension    4. ICD (implantable cardioverter-defibrillator), single, in situ    5. Chronic kidney disease, stage 4, severely decreased GFR         Plan:       ICM  HFmrEF, LVEF 47%  HTN  ICD in situ  CKD  - Presents today for follow up  - Clinically euvolemic, NYHA class I-II symptoms  - Current GDMT is only medium dose  entresto  - Was previously on metoprolol succinate, jardiance and aldactone, however all were stopped after he had an admission for ALEXANDRO in August 2022 with Cr being 6.5  - Labs today stable  - Will add coreg 6.25 mg BID as he has room on both HR and BP  - Will have him RTC in 6 months with TTE and labwork. Can consider re initiating MRA and SGL2i at that time if any worsening LV function or change in clinical status  - Advised him to have his dentist fax us his dental clearance paperwork so we can attest it

## 2023-01-10 LAB — NT-PROBNP SERPL IA-MCNC: 184 PG/ML

## 2023-01-13 ENCOUNTER — TELEPHONE (OUTPATIENT)
Dept: TRANSPLANT | Facility: CLINIC | Age: 58
End: 2023-01-13
Payer: MEDICARE

## 2023-01-13 DIAGNOSIS — I50.42 CHRONIC COMBINED SYSTOLIC AND DIASTOLIC HEART FAILURE: ICD-10-CM

## 2023-01-13 RX ORDER — SACUBITRIL AND VALSARTAN 49; 51 MG/1; MG/1
1 TABLET, FILM COATED ORAL 2 TIMES DAILY
Qty: 90 TABLET | Refills: 3 | Status: SHIPPED | OUTPATIENT
Start: 2023-01-13 | End: 2023-05-08 | Stop reason: SDUPTHER

## 2023-01-13 NOTE — TELEPHONE ENCOUNTER
----- Message from Yen Cui RN sent at 1/13/2023  1:22 PM CST -----  Regarding: FW: Medication Refill  Contact: Patient    ----- Message -----  From: Mayra Coughlin  Sent: 1/13/2023   1:11 PM CST  To: Walter Galloway Staff  Subject: Medication Refill                                Type:  RX Refill Request    Who Called: Patient   Refill or New Rx: Refill   RX Name and Strength:sacubitriL-valsartan (ENTRESTO) 49-51 mg per tablet  How is the patient currently taking it? (ex. 1XDay):Take 1 tablet by mouth 2 (two) times daily. - Oral  Is this a 30 day or 90 day RX: 90 day   Preferred Pharmacy with phone number:[facility] [address] [place] [address]             Phone: [phone]  Local or Mail Order: Local   Ordering Provider: Umang   Would the patient rather a call back or a response via [facility]? Call back   Best Call Back Number:[phone]  Additional Information:  Patient stated he has been attempting to get a refill since appt on 01/09/2023 Please Assist

## 2023-01-13 NOTE — TELEPHONE ENCOUNTER
----- Message from Yen Cui RN sent at 1/13/2023  1:22 PM CST -----  Regarding: FW: Medication Refill  Contact: Patient    ----- Message -----  From: Mayra Coughlin  Sent: 1/13/2023   1:11 PM CST  To: Walter Galloway Staff  Subject: Medication Refill                                Type:  RX Refill Request    Who Called: Patient   Refill or New Rx: Refill   RX Name and Strength:sacubitriL-valsartan (ENTRESTO) 49-51 mg per tablet  How is the patient currently taking it? (ex. 1XDay):Take 1 tablet by mouth 2 (two) times daily. - Oral  Is this a 30 day or 90 day RX: 90 day   Preferred Pharmacy with phone number:[facility] [address] [place] [address]             Phone: [phone]  Local or Mail Order: Local   Ordering Provider: Umang   Would the patient rather a call back or a response via [facility]? Call back   Best Call Back Number:[phone]  Additional Information:  Patient stated he has been attempting to get a refill since appt on 01/09/2023 Please Assist

## 2023-02-20 PROBLEM — N18.4 CHRONIC KIDNEY DISEASE, STAGE 4, SEVERELY DECREASED GFR: Status: RESOLVED | Noted: 2017-01-11 | Resolved: 2023-02-20

## 2023-02-20 PROBLEM — N18.30 CKD (CHRONIC KIDNEY DISEASE) STAGE 3, GFR 30-59 ML/MIN: Status: ACTIVE | Noted: 2023-02-20

## 2023-02-20 PROBLEM — M79.89 SWELLING OF LEFT HAND: Status: RESOLVED | Noted: 2017-12-19 | Resolved: 2023-02-20

## 2023-03-14 ENCOUNTER — CLINICAL SUPPORT (OUTPATIENT)
Dept: CARDIOLOGY | Facility: HOSPITAL | Age: 58
End: 2023-03-14
Payer: MEDICARE

## 2023-03-14 DIAGNOSIS — Z95.810 PRESENCE OF AUTOMATIC (IMPLANTABLE) CARDIAC DEFIBRILLATOR: ICD-10-CM

## 2023-03-14 PROCEDURE — 93295 CARDIAC DEVICE CHECK - REMOTE: ICD-10-PCS | Mod: ,,, | Performed by: INTERNAL MEDICINE

## 2023-03-14 PROCEDURE — 93295 DEV INTERROG REMOTE 1/2/MLT: CPT | Mod: ,,, | Performed by: INTERNAL MEDICINE

## 2023-03-14 PROCEDURE — 93296 REM INTERROG EVL PM/IDS: CPT | Performed by: INTERNAL MEDICINE

## 2023-05-01 PROBLEM — K02.9 CARIES: Status: ACTIVE | Noted: 2023-05-01

## 2023-05-01 PROBLEM — L98.9 SKIN LESION: Chronic | Status: ACTIVE | Noted: 2023-05-01

## 2023-05-02 PROBLEM — N17.9 ACUTE KIDNEY INJURY SUPERIMPOSED ON CHRONIC KIDNEY DISEASE: Status: ACTIVE | Noted: 2017-01-11

## 2023-05-02 PROBLEM — N18.9 ACUTE KIDNEY INJURY SUPERIMPOSED ON CHRONIC KIDNEY DISEASE: Status: ACTIVE | Noted: 2017-01-11

## 2023-05-02 PROBLEM — E87.6 HYPOKALEMIA: Status: ACTIVE | Noted: 2023-05-02

## 2023-05-03 ENCOUNTER — TELEPHONE (OUTPATIENT)
Dept: TRANSPLANT | Facility: CLINIC | Age: 58
End: 2023-05-03
Payer: COMMERCIAL

## 2023-05-03 ENCOUNTER — TELEPHONE (OUTPATIENT)
Dept: ELECTROPHYSIOLOGY | Facility: CLINIC | Age: 58
End: 2023-05-03
Payer: COMMERCIAL

## 2023-05-03 NOTE — TELEPHONE ENCOUNTER
0930 am:  As per requested by Dr. Nunes, I forwarded the message below to Janie Pardo, Dr. Quijano staff and the device clinic [facility]        ----- Message from Rosa Nunes DO sent at 5/3/2023  9:03 AM CDT -----  Thank you for your message. It may have been one of my partners involved in the conversation. I will copy our EP department to assist with turning tachytherapies off for procedure as well as our heart failure coordinators to ensure the message is relayed.    Rosa  ----- Message -----  From: Nae Medina MD  Sent: 5/1/2023   4:07 PM CDT  To: Rosa Nunes DO, Nae Medina MD    Hello,    This mutual patient is scheduled for cataract surgery 5/5/23 (MAC only) and then retinal surgery 5/25/23 (general anesthesia) this month. We've exchanged messages with a cardiology staff regarding suitability for surgery and were informed he is clear to proceed but will need his ICD turned to surgery mode that day. Anesthesia has requested documentation in the chart to this end. Is this something you can assist with? Much appreciated. Please reply all so my resident can assist as well.     Eyal Ruffin

## 2023-05-03 NOTE — TELEPHONE ENCOUNTER
(Please also see prior telephone message from 5/1/23)  Ivan Medina MD  Caller: Unspecified (2 days ago,  4:05 PM)    Hi Dr. Medina',     In relation to this pt having an ICD and upcoming Cataract then Retina surgery; below is the Protocol that is done here @ [facility].  Please note that other Facilities may have their own protocols in place.     Patient has been identified as having an implanted cardiac rhythm device (CRD); the implanted device is a Biotronik ICD.     No Pacer Dependency noted.  Pt with SC ICD.  Last in clinic ICD ck was on 10/22/2021; pt routinely followed remotely (q 3 months).       DEFIBRILLATORS/NON-DEPENDENT ANY LOCATION     Per protocol,a magnet should be applied directly over the implanted device during entire surgical procedure when electrocautery will be used.     If no electrocautery is planned, magnet application is not needed.     Should the Anesthesiologist @ the Facility pt is to have his surgeries done at request to have pt's ICD reprogrammed, the Surgeon's office would need to contact [facility] @ [phone] to arrange for a Biotronik Rep be present on the morning of the Surgery for any needed reprogramming. Should you have any additional questions and/or concerns please contact the Device Clinic @ [facility] [phone], option #4.     Ivan   Arrhythmia Device Clinic

## 2023-05-04 PROBLEM — E11.65 TYPE 2 DIABETES MELLITUS WITH HYPERGLYCEMIA, WITHOUT LONG-TERM CURRENT USE OF INSULIN: Status: ACTIVE | Noted: 2023-05-04

## 2023-06-12 ENCOUNTER — CLINICAL SUPPORT (OUTPATIENT)
Dept: CARDIOLOGY | Facility: HOSPITAL | Age: 58
End: 2023-06-12
Payer: MEDICARE

## 2023-06-12 DIAGNOSIS — I25.5 ISCHEMIC CARDIOMYOPATHY: ICD-10-CM

## 2023-06-12 DIAGNOSIS — I42.0 DILATED CARDIOMYOPATHY: ICD-10-CM

## 2023-06-12 DIAGNOSIS — Z95.810 PRESENCE OF AUTOMATIC (IMPLANTABLE) CARDIAC DEFIBRILLATOR: ICD-10-CM

## 2023-06-12 PROCEDURE — 93296 REM INTERROG EVL PM/IDS: CPT | Performed by: INTERNAL MEDICINE

## 2023-07-18 ENCOUNTER — TELEPHONE (OUTPATIENT)
Dept: ELECTROPHYSIOLOGY | Facility: CLINIC | Age: 58
End: 2023-07-18
Payer: COMMERCIAL

## 2023-08-07 PROBLEM — N17.9 AKI (ACUTE KIDNEY INJURY): Status: RESOLVED | Noted: 2017-01-11 | Resolved: 2023-08-07

## 2023-08-15 ENCOUNTER — TELEPHONE (OUTPATIENT)
Dept: ELECTROPHYSIOLOGY | Facility: CLINIC | Age: 58
End: 2023-08-15
Payer: COMMERCIAL

## 2023-08-17 ENCOUNTER — CLINICAL SUPPORT (OUTPATIENT)
Dept: CARDIOLOGY | Facility: HOSPITAL | Age: 58
End: 2023-08-17
Attending: INTERNAL MEDICINE
Payer: COMMERCIAL

## 2023-08-17 ENCOUNTER — OFFICE VISIT (OUTPATIENT)
Dept: ELECTROPHYSIOLOGY | Facility: CLINIC | Age: 58
End: 2023-08-17
Payer: COMMERCIAL

## 2023-08-17 ENCOUNTER — HOSPITAL ENCOUNTER (OUTPATIENT)
Dept: CARDIOLOGY | Facility: CLINIC | Age: 58
Discharge: HOME | End: 2023-08-17
Payer: COMMERCIAL

## 2023-08-17 VITALS
BODY MASS INDEX: 27.18 KG/M2 | SYSTOLIC BLOOD PRESSURE: 110 MMHG | HEART RATE: 62 BPM | WEIGHT: 163.38 LBS | DIASTOLIC BLOOD PRESSURE: 62 MMHG

## 2023-08-17 DIAGNOSIS — E11.22 TYPE 2 DIABETES MELLITUS WITH STAGE 4 CHRONIC KIDNEY DISEASE, WITHOUT LONG-TERM CURRENT USE OF INSULIN: ICD-10-CM

## 2023-08-17 DIAGNOSIS — N18.4 TYPE 2 DIABETES MELLITUS WITH STAGE 4 CHRONIC KIDNEY DISEASE, WITHOUT LONG-TERM CURRENT USE OF INSULIN: ICD-10-CM

## 2023-08-17 DIAGNOSIS — Z95.810 ICD (IMPLANTABLE CARDIOVERTER-DEFIBRILLATOR), SINGLE, IN SITU: ICD-10-CM

## 2023-08-17 DIAGNOSIS — I25.10 CORONARY ARTERY DISEASE INVOLVING NATIVE CORONARY ARTERY OF NATIVE HEART WITHOUT ANGINA PECTORIS: ICD-10-CM

## 2023-08-17 DIAGNOSIS — I49.8 OTHER CARDIAC ARRHYTHMIA: ICD-10-CM

## 2023-08-17 DIAGNOSIS — I50.22 CHRONIC HFREF (HEART FAILURE WITH REDUCED EJECTION FRACTION): Primary | ICD-10-CM

## 2023-08-17 DIAGNOSIS — I10 PRIMARY HYPERTENSION: ICD-10-CM

## 2023-08-17 PROCEDURE — 99999 PR PBB SHADOW E&M-EST. PATIENT-LVL III: ICD-10-PCS | Mod: PBBFAC,,, | Performed by: NURSE PRACTITIONER

## 2023-08-17 PROCEDURE — 93010 RHYTHM STRIP: ICD-10-PCS | Mod: S$GLB,,, | Performed by: INTERNAL MEDICINE

## 2023-08-17 PROCEDURE — 93010 ELECTROCARDIOGRAM REPORT: CPT | Mod: S$GLB,,, | Performed by: INTERNAL MEDICINE

## 2023-08-17 PROCEDURE — 93005 RHYTHM STRIP: ICD-10-PCS | Mod: S$GLB,,, | Performed by: INTERNAL MEDICINE

## 2023-08-17 PROCEDURE — 93005 ELECTROCARDIOGRAM TRACING: CPT | Mod: S$GLB,,, | Performed by: INTERNAL MEDICINE

## 2023-08-17 PROCEDURE — 99999 PR PBB SHADOW E&M-EST. PATIENT-LVL III: CPT | Mod: PBBFAC,,, | Performed by: NURSE PRACTITIONER

## 2023-08-17 PROCEDURE — 99214 PR OFFICE/OUTPT VISIT, EST, LEVL IV, 30-39 MIN: ICD-10-PCS | Mod: S$GLB,,, | Performed by: NURSE PRACTITIONER

## 2023-08-17 PROCEDURE — 99214 OFFICE O/P EST MOD 30 MIN: CPT | Mod: S$GLB,,, | Performed by: NURSE PRACTITIONER

## 2023-08-17 RX ORDER — CHLORHEXIDINE GLUCONATE ORAL RINSE 1.2 MG/ML
SOLUTION DENTAL
COMMUNITY
Start: 2023-05-16

## 2023-08-17 NOTE — PROGRESS NOTES
Mr. Monroe is a patient of Dr. Cline and was last seen in clinic 10/22/2021.    Subjective:   Patient ID:  Mick Monroe Jr. is a 58 y.o. male who presents for follow-up for annual ICD check.    HPI: Mr. Monroe is a 58 y.o. male with a past medical history of ischemic cardiomyopathy, SC ICD placement (08/31/17), CAD, CHF, HTN, DM, CKD IV .    Background:  Mr. Monroe is a 58 y.o. male with ischemic cardiomyopathy, CAD, CHF, HTN, DM, CKD IV.  Primary cardiologist is Dr. West.  Also sees Dr. Lau.  Initially presented with CHF and new-onset cardiomyopathy (12/17).    Echo (12/16) EF 10-15%>>LifeVest placed.   LHC (12/21/16) PCI to LAD and RCA, subtotal occlusion of Cx>>placed on optimal medical therapy.  Echo (04/24/17) EF 25-30%  At his initial EP visit, Mr. Monroe reported experiencing dyspnea on exertion c/w NYHA Class II CHF>>denied syncope, palpitations.  He underwent successful SC ICD placement (08/31/17) without complication.   Felt well at clinic visit 6/5/2019.  10/22/2020: Stable lead and device function. No arrhythmia noted. No RV pacing. No CHF symptoms. He is on GDMT. Last EF 6/2018 improved to 40%.     10/22/2021: Last office visit with Janie Pardo.Today he says he feels great. No cardiac complaints. Mr. Monroe reports no chest pain with exertion or at rest, palpitations, SOB, KONG, dizziness, or syncope.  Device Interrogation (10/22/2021) reveals an intrinsic SR with PVCs with stable lead and device function. No arrhythmias or treated episodes were noted. He paces 0% in the RV. Estimated battery longevity 3.1V (76%). Battery advisory.  EKG shows sinus rhythm with PACs at 64bpm. OK interval is 206. QRS is 88. QTc is 422.  Mr. Monroe is doing well from a device perspective with stable lead and device function. No arrhythmia noted. No RV pacing. No CHF symptoms. On GDMT. He feels great.  I note abnormal labs from several months ago - he has an upcoming internal medicine visit in a  week - will repeat his BMP. Battery advisory in place. A new home monitor has been ordered for him.   Continue current medication regimen and device settings.   Follow up in device clinic as scheduled.   Follow up in EP clinic in 1 year, sooner as needed.     Update (08/17/2023):  Mr. Monroe presents today for annual device check. Today he feels well and denies chest pain with exertion or at rest, palpitations, SOB, KONG, dizziness, or syncope. No CHF symptoms. On GDMT.    Device Interrogation (8/17/2023) reveals an intrinsic sinus rhythm with stable lead and device function. No arrhythmias or treated episodes were noted. He paces 0% in the RV. Estimated battery longevity 3.1V (54%). Battery advisory.     I have personally reviewed the patient's EKG today, which shows sinus rhythm with first degree AV block at 62 bpm. IL interval is 230 ms. QRS interval is 98 ms. QT/QTc is 438/444 ms.    Mr. Monroe sees Dr. Walter Galloway with heart failure with scheduled follow up 9/25/2023 with labs and echo ordered at that time.     Recent Cardiac Tests:  2D Echo (8/29/2022):  The left ventricle is normal in size with concentric remodeling and low normal systolic function. The estimated ejection fraction is 50%.  There is abnormal septal wall motion.  The quantitatively derived ejection fraction is 47%.  Normal left ventricular diastolic function.  Normal right ventricular size with normal right ventricular systolic function.  Mild tricuspid regurgitation.  The estimated PA systolic pressure is 19 mmHg.  Normal central venous pressure (3 mmHg).    Past Medical History:   Diagnosis Date    Arthritis     Diabetes mellitus     Hypertension      Past Surgical History:   Procedure Laterality Date    APPENDECTOMY      CATARACT EXTRACTION       Current Outpatient Medications   Medication Sig    aspirin (ECOTRIN) 81 MG EC tablet Take 81 mg by mouth once daily.    blood sugar diagnostic Strp To check BG one time daily, to use with  "insurance preferred meter    carvediloL (COREG) 6.25 MG tablet Take 1 tablet (6.25 mg total) by mouth 2 (two) times daily with meals.    chlorhexidine (PERIDEX) 0.12 % solution SMARTSIG:By Mouth As Directed    empagliflozin (JARDIANCE) 25 mg tablet Take 1 tablet (25 mg total) by mouth once daily.    furosemide (LASIX) 40 MG tablet Take 1 tablet (40 mg total) by mouth 2 (two) times daily.    insulin detemir U-100, Levemir, 100 unit/mL (3 mL) SubQ InPn pen Inject 20 Units into the skin once daily.    lancets Misc To check BG one time daily, to use with insurance preferred meter    pen needle, diabetic 32 gauge x 5/32" Ndle 1 Box by Misc.(Non-Drug; Combo Route) route once daily.    rosuvastatin (CRESTOR) 20 MG tablet Take 1 tablet (20 mg total) by mouth once daily.    sacubitriL-valsartan (ENTRESTO) 49-51 mg per tablet Take 1 tablet by mouth 2 (two) times daily.    isosorbide mononitrate (IMDUR) 30 MG 24 hr tablet Take 1 tablet (30 mg total) by mouth once daily. (Patient not taking: Reported on 6/20/2023)     No current facility-administered medications for this visit.     Review of Systems   Constitutional: Negative for chills, fever and malaise/fatigue.   HENT:  Negative for congestion and nosebleeds.    Eyes:  Negative for blurred vision.   Cardiovascular:  Negative for chest pain, dyspnea on exertion, irregular heartbeat, leg swelling, near-syncope, orthopnea, palpitations, paroxysmal nocturnal dyspnea and syncope.   Respiratory:  Negative for cough, hemoptysis, shortness of breath, sleep disturbances due to breathing and wheezing.    Endocrine: Negative for polyphagia.   Hematologic/Lymphatic: Negative for bleeding problem. Does not bruise/bleed easily.   Gastrointestinal:  Negative for abdominal pain and hematemesis.   Genitourinary:  Negative for hematuria.   Neurological:  Negative for dizziness, focal weakness, headaches, light-headedness, loss of balance and weakness.   Psychiatric/Behavioral:  Negative for " altered mental status. The patient is not nervous/anxious.      Objective:     /62   Pulse 62   Wt 74.1 kg (163 lb 5.8 oz)   BMI 27.18 kg/m²     Physical Exam  Vitals and nursing note reviewed.   Constitutional:       General: He is not in acute distress.     Appearance: Normal appearance. He is well-developed. He is not ill-appearing or diaphoretic.   HENT:      Head: Normocephalic and atraumatic.   Cardiovascular:      Rate and Rhythm: Normal rate and regular rhythm.      Pulses:           Radial pulses are 2+ on the right side and 2+ on the left side.      Heart sounds: Normal heart sounds, S1 normal and S2 normal.   Pulmonary:      Effort: Pulmonary effort is normal. No accessory muscle usage or respiratory distress.      Breath sounds: Normal breath sounds. No decreased breath sounds, wheezing, rhonchi or rales.   Chest:      Comments: Device to LUCW. Incision and pocket in good condition.    Abdominal:      Tenderness: There is no abdominal tenderness.   Musculoskeletal:         General: No swelling. Normal range of motion.      Cervical back: Normal range of motion and neck supple.   Skin:     General: Skin is warm and dry.      Findings: No bruising or erythema.   Neurological:      Mental Status: He is alert and oriented to person, place, and time. He is not disoriented.      Gait: Gait normal.   Psychiatric:         Mood and Affect: Mood normal.         Speech: Speech normal.         Behavior: Behavior normal.         Thought Content: Thought content normal.         Judgment: Judgment normal.       Lab Results   Component Value Date     05/04/2023     (L) 09/25/2019    K 4.0 05/04/2023    K 4.5 09/25/2019    MG 2.9 (H) 05/03/2023    BUN 36 (H) 05/04/2023    CREATININE 2.1 (H) 05/04/2023    CREATININE 2.50 (H) 09/25/2019    ALT 9 (L) 05/02/2023    AST 11 05/02/2023    HGB 13.3 05/02/2023    HCT 40.7 05/02/2023    LDLCALC 60.4 (L) 08/29/2022    LDLCALC 175 (H) 09/25/2019     Assessment:      1. Chronic HFrEF (heart failure with reduced/recovered ejection fraction)    2. ICD (implantable cardioverter-defibrillator), single, in situ    3. Coronary artery disease involving native coronary artery of native heart without angina pectoris    4. Type 2 diabetes mellitus with stage 4 chronic kidney disease, without long-term current use of insulin    5. Primary hypertension      Plan:   In summary, Mr. Monroe is a 58 y.o. male with a past medical history of ischemic cardiomyopathy, SC ICD placement (08/31/17), CAD, CHF, HTN, DM, CKD IV who presents today for annual device check. He is doing well from a device perspective with stable lead and device function. No arrhythmia noted. No RV pacing. No CHF symptoms. On GDMT. He feels great.    I have personally reviewed the patient's EKG today, which shows sinus rhythm with first degree AV block at 62 bpm. AZ interval is 230 ms. QRS interval is 98 ms. QT/QTc is 438/444 ms.    Mr. Monroe has a follow up with Dr. Walter Galloway with heart failure 9/25/2023 with labs and echo ordered at that time.     Plan:  -Continue current medication regimen and device settings.   -Follow up in device clinic as scheduled.   -Follow up with Dr. Galloway as scheduled on 9/25/23.  -Follow up in EP clinic in 1 year, sooner as needed.      NOEMI Hoyt, NADJA-LIANE  Cardiology-Electrophysiology/Arrhythmia NP [facility]   ------------------------------------------------------------------  *A copy of this note has been sent to Dr. Cline*

## 2023-09-10 ENCOUNTER — CLINICAL SUPPORT (OUTPATIENT)
Dept: CARDIOLOGY | Facility: HOSPITAL | Age: 58
End: 2023-09-10
Payer: COMMERCIAL

## 2023-09-10 DIAGNOSIS — Z95.810 PRESENCE OF AUTOMATIC (IMPLANTABLE) CARDIAC DEFIBRILLATOR: ICD-10-CM

## 2023-09-10 PROCEDURE — 93296 REM INTERROG EVL PM/IDS: CPT | Performed by: INTERNAL MEDICINE

## 2023-09-10 PROCEDURE — 93295 DEV INTERROG REMOTE 1/2/MLT: CPT | Mod: ,,, | Performed by: INTERNAL MEDICINE

## 2023-09-10 PROCEDURE — 93295 CARDIAC DEVICE CHECK - REMOTE: ICD-10-PCS | Mod: ,,, | Performed by: INTERNAL MEDICINE

## 2023-12-20 ENCOUNTER — CLINICAL SUPPORT (OUTPATIENT)
Dept: CARDIOLOGY | Facility: HOSPITAL | Age: 58
End: 2023-12-20
Payer: COMMERCIAL

## 2023-12-20 ENCOUNTER — CLINICAL SUPPORT (OUTPATIENT)
Dept: CARDIOLOGY | Facility: HOSPITAL | Age: 58
End: 2023-12-20
Attending: INTERNAL MEDICINE
Payer: COMMERCIAL

## 2023-12-20 DIAGNOSIS — Z95.810 PRESENCE OF AUTOMATIC (IMPLANTABLE) CARDIAC DEFIBRILLATOR: ICD-10-CM

## 2023-12-20 PROCEDURE — 93295 CARDIAC DEVICE CHECK - REMOTE: ICD-10-PCS | Mod: ,,, | Performed by: INTERNAL MEDICINE

## 2023-12-20 PROCEDURE — 93296 REM INTERROG EVL PM/IDS: CPT | Performed by: INTERNAL MEDICINE

## 2023-12-20 PROCEDURE — 93295 DEV INTERROG REMOTE 1/2/MLT: CPT | Mod: ,,, | Performed by: INTERNAL MEDICINE

## 2023-12-27 LAB
OHS CV AF BURDEN PERCENT: < 1
OHS CV DC REMOTE DEVICE TYPE: NORMAL
OHS CV RV PACING PERCENT: 0 %

## 2024-01-09 ENCOUNTER — TELEPHONE (OUTPATIENT)
Dept: CARDIOLOGY | Facility: HOSPITAL | Age: 59
End: 2024-01-09

## 2024-01-09 NOTE — TELEPHONE ENCOUNTER
Returned patients call, no answer.  I left a voice message for him to return my call.  I left the number to the device clinic.   ----- Message from Joann Ochoa sent at 1/9/2024 12:10 PM CST -----  Regarding: Replacement device  Pt [phone] had to have his device replaced and he received his new device. The pt need help setting up the bedside device although the instructions came with the device he states he won't be able to setup the device.    Thanks

## 2024-01-09 NOTE — TELEPHONE ENCOUNTER
Returned patients call, no answer.  Left a voice message for him to return my call.  Left the number to the device clinic.   ----- Message from Paz Kelsey RN sent at 1/9/2024 10:10 AM CST -----  Regarding: FW: Monitor    ----- Message -----  From: Rabia Cormier  Sent: 1/9/2024  10:07 AM CST  To: Marlyn Hernandez Staff  Subject: Monitor                                          Patient received his monitor and need it activated, also he calling to speak with staff regarding his insurance. Please call back @ [phone]. Thank you Rabia

## 2024-01-25 ENCOUNTER — TELEPHONE (OUTPATIENT)
Dept: ELECTROPHYSIOLOGY | Facility: CLINIC | Age: 59
End: 2024-01-25

## 2024-01-25 NOTE — TELEPHONE ENCOUNTER
----- Message from Joann Ochoa sent at 1/19/2024 11:40 AM CST -----  Regarding: Replacement device/Missed Call  Pt [phone] had to have his device replaced and he received his new device. The pt need help setting up the bedside device although the instructions came with the device he states he won't be able to setup the device.     Thanks

## 2024-01-25 NOTE — TELEPHONE ENCOUNTER
"Spoke to pt who states he has connected his new remote monitor. Informed the pt to confirm he says "OK" if so he is to call [employer] for further assistance. Pt was not at home but will recheck his monitor when he returns home. Pt was also given the contact @ for the Device Clinic. Understanding was verbalized.  Pt appreciated the call.    "

## 2024-01-26 ENCOUNTER — TELEPHONE (OUTPATIENT)
Dept: CARDIOLOGY | Facility: HOSPITAL | Age: 59
End: 2024-01-26

## 2024-01-26 ENCOUNTER — TELEPHONE (OUTPATIENT)
Dept: ELECTROPHYSIOLOGY | Facility: CLINIC | Age: 59
End: 2024-01-26

## 2024-01-26 NOTE — TELEPHONE ENCOUNTER
Patient called to make sure his [employer] remote home monitor was re-connected.  I informed him that he is re-connected.  Patient stated he will soon be changing EP doctors to closer where he lives.  He stated they will be calling us to move his remote monitoring over to them.

## 2024-01-26 NOTE — TELEPHONE ENCOUNTER
Contacted pt to check his Biotronik Home monitor as it shows disconnection.      Pt states he hasn't been sleeping by his HM. He will plug it in and sleep next to it.    Pt expressed he may be transferring a care to a local MD.    Advised him that once he establishes care, his remote monitoring can be transferred.    Pt will contact our clinic if he transfers care. Provided pt with our clinic ph number.

## 2024-03-20 ENCOUNTER — CLINICAL SUPPORT (OUTPATIENT)
Dept: CARDIOLOGY | Facility: HOSPITAL | Age: 59
End: 2024-03-20

## 2024-03-20 ENCOUNTER — CLINICAL SUPPORT (OUTPATIENT)
Dept: CARDIOLOGY | Facility: HOSPITAL | Age: 59
End: 2024-03-20
Attending: INTERNAL MEDICINE

## 2024-03-20 DIAGNOSIS — Z95.810 PRESENCE OF AUTOMATIC (IMPLANTABLE) CARDIAC DEFIBRILLATOR: ICD-10-CM

## 2024-03-20 PROCEDURE — 93295 DEV INTERROG REMOTE 1/2/MLT: CPT | Mod: ,,, | Performed by: INTERNAL MEDICINE

## 2024-03-20 PROCEDURE — 93296 REM INTERROG EVL PM/IDS: CPT | Performed by: INTERNAL MEDICINE

## 2024-03-22 ENCOUNTER — TELEPHONE (OUTPATIENT)
Dept: ELECTROPHYSIOLOGY | Facility: CLINIC | Age: 59
End: 2024-03-22

## 2024-03-22 NOTE — TELEPHONE ENCOUNTER
----- Message from Carolina Fernandez MA sent at 3/22/2024  3:16 PM CDT -----  Regarding: FW: Advise    ----- Message -----  From: Rabia Cormier  Sent: 3/22/2024   2:28 PM CDT  To: aMrlyn Hernandez Staff  Subject: Advise                                           Patient change insurance and asking for some advice in finding another doctor closer to home. Please call back @  [phone]/ c [phone]. Thank you Rabia

## 2024-03-22 NOTE — TELEPHONE ENCOUNTER
Patient no longer has transportation to [place] and wants to follow up in the [facility]. Do you have any recommendations for EP there?

## 2024-04-03 DIAGNOSIS — I50.42 CHRONIC COMBINED SYSTOLIC AND DIASTOLIC HEART FAILURE: ICD-10-CM

## 2024-04-03 LAB
OHS CV AF BURDEN PERCENT: < 1
OHS CV DC REMOTE DEVICE TYPE: NORMAL
OHS CV RV PACING PERCENT: 0 %

## 2024-04-03 RX ORDER — CARVEDILOL 6.25 MG/1
6.25 TABLET ORAL 2 TIMES DAILY WITH MEALS
Qty: 180 TABLET | Refills: 0 | OUTPATIENT
Start: 2024-04-03

## 2024-04-03 NOTE — TELEPHONE ENCOUNTER
Waiting for patient to call back LVM that he needs labs and a TTE also a 6 month follow up with Nilesh before we can fill any medication for him.

## 2024-04-18 NOTE — TELEPHONE ENCOUNTER
"Called pt back. Pt reports he is not sure if "buzzing" noise coming from his device but heard a soft vibrating noise from the right side of his device. Reviewed remote home monitor transmission from 3/2/2020. Lead and battery trends stable as of this morning. Advised not convinced his device alerting for now, continue to monitor and call back if any questions/concerns.   " Yes

## 2024-04-29 DIAGNOSIS — I50.42 CHRONIC COMBINED SYSTOLIC AND DIASTOLIC HEART FAILURE: ICD-10-CM

## 2024-04-30 RX ORDER — CARVEDILOL 6.25 MG/1
6.25 TABLET ORAL 2 TIMES DAILY WITH MEALS
Qty: 180 TABLET | Refills: 0 | Status: SHIPPED | OUTPATIENT
Start: 2024-04-30 | End: 2024-06-10

## 2024-06-10 DIAGNOSIS — I50.42 CHRONIC COMBINED SYSTOLIC AND DIASTOLIC HEART FAILURE: ICD-10-CM

## 2024-06-10 RX ORDER — CARVEDILOL 6.25 MG/1
6.25 TABLET ORAL 2 TIMES DAILY WITH MEALS
Qty: 180 TABLET | Refills: 0 | Status: SHIPPED | OUTPATIENT
Start: 2024-06-10

## 2024-06-19 ENCOUNTER — CLINICAL SUPPORT (OUTPATIENT)
Dept: CARDIOLOGY | Facility: HOSPITAL | Age: 59
End: 2024-06-19
Attending: INTERNAL MEDICINE

## 2024-06-19 ENCOUNTER — CLINICAL SUPPORT (OUTPATIENT)
Dept: CARDIOLOGY | Facility: HOSPITAL | Age: 59
End: 2024-06-19

## 2024-06-19 DIAGNOSIS — Z95.810 PRESENCE OF AUTOMATIC (IMPLANTABLE) CARDIAC DEFIBRILLATOR: ICD-10-CM

## 2024-06-19 PROCEDURE — 93296 REM INTERROG EVL PM/IDS: CPT | Performed by: INTERNAL MEDICINE

## 2024-06-26 LAB
OHS CV AF BURDEN PERCENT: < 1
OHS CV DC REMOTE DEVICE TYPE: NORMAL
OHS CV RV PACING PERCENT: 0 %

## 2024-09-09 ENCOUNTER — CLINICAL SUPPORT (OUTPATIENT)
Dept: CARDIOLOGY | Facility: HOSPITAL | Age: 59
End: 2024-09-09

## 2024-09-09 DIAGNOSIS — I42.8 OTHER CARDIOMYOPATHIES: ICD-10-CM

## 2024-09-09 DIAGNOSIS — Z95.810 PRESENCE OF AUTOMATIC (IMPLANTABLE) CARDIAC DEFIBRILLATOR: ICD-10-CM

## 2024-09-18 ENCOUNTER — CLINICAL SUPPORT (OUTPATIENT)
Dept: CARDIOLOGY | Facility: HOSPITAL | Age: 59
End: 2024-09-18
Attending: INTERNAL MEDICINE
Payer: MEDICARE

## 2024-09-18 DIAGNOSIS — Z95.810 PRESENCE OF AUTOMATIC (IMPLANTABLE) CARDIAC DEFIBRILLATOR: ICD-10-CM

## 2024-09-18 DIAGNOSIS — I42.8 OTHER CARDIOMYOPATHIES: ICD-10-CM

## 2024-09-18 PROCEDURE — 93296 REM INTERROG EVL PM/IDS: CPT | Performed by: INTERNAL MEDICINE

## 2024-09-22 ENCOUNTER — PATIENT OUTREACH (OUTPATIENT)
Dept: ADMINISTRATIVE | Facility: HOSPITAL | Age: 59
End: 2024-09-22
Payer: MEDICARE

## 2024-09-22 DIAGNOSIS — E11.22 TYPE 2 DIABETES MELLITUS WITH STAGE 4 CHRONIC KIDNEY DISEASE, WITHOUT LONG-TERM CURRENT USE OF INSULIN: Primary | ICD-10-CM

## 2024-09-22 DIAGNOSIS — N18.4 TYPE 2 DIABETES MELLITUS WITH STAGE 4 CHRONIC KIDNEY DISEASE, WITHOUT LONG-TERM CURRENT USE OF INSULIN: Primary | ICD-10-CM

## 2024-10-07 ENCOUNTER — TELEPHONE (OUTPATIENT)
Dept: ELECTROPHYSIOLOGY | Facility: CLINIC | Age: 59
End: 2024-10-07
Payer: MEDICARE

## 2024-10-14 ENCOUNTER — TELEPHONE (OUTPATIENT)
Dept: ELECTROPHYSIOLOGY | Facility: CLINIC | Age: 59
End: 2024-10-14
Payer: MEDICARE

## 2024-10-15 ENCOUNTER — TELEPHONE (OUTPATIENT)
Dept: CARDIOLOGY | Facility: HOSPITAL | Age: 59
End: 2024-10-15
Payer: MEDICARE

## 2024-10-15 LAB
OHS CV AF BURDEN PERCENT: < 1
OHS CV DC REMOTE DEVICE TYPE: NORMAL
OHS CV RV PACING PERCENT: 0 %

## 2024-10-15 NOTE — TELEPHONE ENCOUNTER
Called and spoke to pt on this afternoon in relation to his remote ICD home monitor as has established care with Dr. David Jimenez @ [facility].  Advised pt that his remote monitor is currently registered to [facility] and that it will need to be transferred to his current doctor.  Advised pt that I would contact Dr. Jimenez's office to let them know that his remote monitoring is being deactivated to allow them to register him to their clinic.  Understanding was verbalized.  Pt appreciated the call.   Called Dr. Jimenez's office, spoke to Kamila and advised her of the above.  She stated she will have their local [employer] Rep to assist her in registering pt to their clinic.

## 2024-11-07 ENCOUNTER — PATIENT OUTREACH (OUTPATIENT)
Dept: ADMINISTRATIVE | Facility: HOSPITAL | Age: 59
End: 2024-11-07
Payer: MEDICARE

## 2024-11-07 DIAGNOSIS — N18.4 TYPE 2 DIABETES MELLITUS WITH STAGE 4 CHRONIC KIDNEY DISEASE, WITHOUT LONG-TERM CURRENT USE OF INSULIN: Primary | ICD-10-CM

## 2024-11-07 DIAGNOSIS — E11.22 TYPE 2 DIABETES MELLITUS WITH STAGE 4 CHRONIC KIDNEY DISEASE, WITHOUT LONG-TERM CURRENT USE OF INSULIN: Primary | ICD-10-CM

## 2025-03-05 ENCOUNTER — PATIENT OUTREACH (OUTPATIENT)
Dept: ADMINISTRATIVE | Facility: OTHER | Age: 60
End: 2025-03-05
Payer: MEDICARE

## 2025-03-05 ENCOUNTER — TELEPHONE (OUTPATIENT)
Dept: PHARMACY | Facility: CLINIC | Age: 60
End: 2025-03-05
Payer: MEDICARE

## 2025-03-05 NOTE — PROGRESS NOTES
CHW - Initial Contact    This Community Health Worker completed the Social Determinant of Health questionnaire with patient via telephone today.    Pt identified barriers of most importance are: medication assistance    Support and Services: A referral was placed by patients PCP for [facility] for assistance with patients Jardiance and Kaushikro. I have placed a referral to Patients pharmacy assistance team to assistance. I have also in basket patients PCP regarding referral. Mr. Barton received transportation assistance through his insurance duo to 100 miles. He is also iftikhar to drive to local places. His insurance company has denied his trips to [place] due it being over his 100 miles allowance. He has applied for  about a week ago and was informed that it will take 14 days for a response. At this time I am not aware of any transportation companies to take him that far from his home.    Other information discussed the patient needs / wants help with: extended mileage transportation.   Follow up required: yes   Follow-up Outreach - Due: 3/14/2025

## 2025-03-05 NOTE — TELEPHONE ENCOUNTER
----- Message from Sari Doan sent at 3/5/2025  1:39 PM CST -----  Good Evening, I've received an referral from the above patient PCP regarding him needling assistance affording his Jardiance and Entrestro medication. Can Mr. Monroe be screened for any possible copay assistance benefitsThank you,

## 2025-03-05 NOTE — TELEPHONE ENCOUNTER
We have reached out to Mr. Monroe via letter to inform him of the [employer] and [employer] application process for Entresto and Jardiance. A follow-up phone call will be made in 5 business days.    Shamar [place]   Pharmacy Patient Assistance Team

## 2025-03-05 NOTE — LETTER
March 5, 2025    Mick Monroe Jr.  [address] [place] [id number]               Dear Will Monroe,      My name is Shamar Husain   I am reaching out on behalf of [facility]s Pharmacy Patient Assistance Team in regards to your request for medication assistance. Our goal is to assist qualified [facility] patients with obtaining financial assistance for prescribed medications.     Please note that enrollment into available support may require the following documents:      Proof of household Income (such as social security award letter, pension statement,1040)  Copy of all insurance cards (front and back)  Print out from your insurance or pharmacy showing how much you have spent on prescriptions for the current year  Completed Medication Access Center Authorization Forms        Please reach out to my phone number below if you are still in need of assistance with your medications. Follow-up call will be made in 5 business days. We look forward to hearing from you soon!    Thank you for choosing [facility] for your healthcare needs      Sincerely  Shamar OG @[phone]  Pharmacy Patient Assistance Team  [address] [place] [address]  [address] [place] [id number]  Fax: [phone]  Email: [email or web address]

## 2025-03-12 ENCOUNTER — PATIENT OUTREACH (OUTPATIENT)
Dept: ADMINISTRATIVE | Facility: OTHER | Age: 60
End: 2025-03-12
Payer: MEDICARE

## 2025-03-12 NOTE — PROGRESS NOTES
CHW - Case Closure    This Community Health Worker spoke to patient via telephone today.   Pt/Caregiver reported: Mr. Barton stated he has received the letter from pharmacy assistance and has agreed to complete and submit,  Pt denied any additional needs at this time and agrees with episode closure at this time.  Provided patient with Community Health Worker's contact information and encouraged him/her to contact this Community Health Worker if additional needs arise.

## 2025-03-13 PROBLEM — H25.811 COMBINED FORMS OF AGE-RELATED CATARACT OF RIGHT EYE: Status: ACTIVE | Noted: 2025-03-13

## 2025-05-13 NOTE — TELEPHONE ENCOUNTER
Pt confused about upcoming appointments, reviewed pre op instructions and confirmed procedure for 8/31/17. Pt encouraged to call Dr. Lau's office with any questions he has regarding that apt on 9/5/17. Pt verbalized understanding and encouraged to call back with any questions or concerns about his upcoming procedure.      ----- Message from Samreen Hess sent at 7/31/2017  1:10 PM CDT -----  Contact: Pt caled  Pt called, states he is returning a call and believes it was from you. Pt has a scheduled procedure on 8/31/17. Ph [phone]. Thank you     Detail Level: Detailed Introduction Text (Please End With A Colon): The following procedure was deferred: X Size Of Lesion In Cm (Optional): 0

## 2025-08-13 ENCOUNTER — PATIENT OUTREACH (OUTPATIENT)
Dept: ADMINISTRATIVE | Facility: HOSPITAL | Age: 60
End: 2025-08-13
Payer: MEDICARE